# Patient Record
Sex: FEMALE | Race: BLACK OR AFRICAN AMERICAN | NOT HISPANIC OR LATINO | URBAN - METROPOLITAN AREA
[De-identification: names, ages, dates, MRNs, and addresses within clinical notes are randomized per-mention and may not be internally consistent; named-entity substitution may affect disease eponyms.]

---

## 2017-01-11 ENCOUNTER — INPATIENT (INPATIENT)
Facility: HOSPITAL | Age: 77
LOS: 23 days | Discharge: NURSING, REHAB OR LONG-TERM CARE | End: 2017-02-04
Attending: HOSPITALIST | Admitting: HOSPITALIST
Payer: COMMERCIAL

## 2017-01-11 VITALS
OXYGEN SATURATION: 100 % | RESPIRATION RATE: 16 BRPM | SYSTOLIC BLOOD PRESSURE: 139 MMHG | TEMPERATURE: 99 F | HEART RATE: 87 BPM | DIASTOLIC BLOOD PRESSURE: 62 MMHG

## 2017-01-11 DIAGNOSIS — Z98.891 HISTORY OF UTERINE SCAR FROM PREVIOUS SURGERY: Chronic | ICD-10-CM

## 2017-01-11 LAB
ALBUMIN SERPL ELPH-MCNC: 2.3 G/DL — LOW (ref 3.3–5)
ALP SERPL-CCNC: 84 U/L — SIGNIFICANT CHANGE UP (ref 40–120)
ALT FLD-CCNC: 14 U/L — SIGNIFICANT CHANGE UP (ref 4–33)
APPEARANCE UR: SIGNIFICANT CHANGE UP
AST SERPL-CCNC: 12 U/L — SIGNIFICANT CHANGE UP (ref 4–32)
BACTERIA # UR AUTO: SIGNIFICANT CHANGE UP
BASE EXCESS BLDV CALC-SCNC: 3.5 MMOL/L — SIGNIFICANT CHANGE UP
BASOPHILS # BLD AUTO: 0.03 K/UL — SIGNIFICANT CHANGE UP (ref 0–0.2)
BASOPHILS NFR BLD AUTO: 0.2 % — SIGNIFICANT CHANGE UP (ref 0–2)
BILIRUB SERPL-MCNC: 0.5 MG/DL — SIGNIFICANT CHANGE UP (ref 0.2–1.2)
BILIRUB UR-MCNC: NEGATIVE — SIGNIFICANT CHANGE UP
BLOOD GAS VENOUS - CREATININE: 0.92 MG/DL — SIGNIFICANT CHANGE UP (ref 0.5–1.3)
BLOOD UR QL VISUAL: HIGH
BUN SERPL-MCNC: 4 MG/DL — LOW (ref 7–23)
CALCIUM SERPL-MCNC: 8.3 MG/DL — LOW (ref 8.4–10.5)
CHLORIDE BLDV-SCNC: 104 MMOL/L — SIGNIFICANT CHANGE UP (ref 96–108)
CHLORIDE SERPL-SCNC: 99 MMOL/L — SIGNIFICANT CHANGE UP (ref 98–107)
CO2 SERPL-SCNC: 23 MMOL/L — SIGNIFICANT CHANGE UP (ref 22–31)
COLOR SPEC: YELLOW — SIGNIFICANT CHANGE UP
CREAT SERPL-MCNC: 0.58 MG/DL — SIGNIFICANT CHANGE UP (ref 0.5–1.3)
EOSINOPHIL # BLD AUTO: 0.01 K/UL — SIGNIFICANT CHANGE UP (ref 0–0.5)
EOSINOPHIL NFR BLD AUTO: 0.1 % — SIGNIFICANT CHANGE UP (ref 0–6)
GAS PNL BLDV: 135 MMOL/L — LOW (ref 136–146)
GLUCOSE BLDV-MCNC: 104 — HIGH (ref 70–99)
GLUCOSE SERPL-MCNC: 179 MG/DL — HIGH (ref 70–99)
GLUCOSE UR-MCNC: NEGATIVE — SIGNIFICANT CHANGE UP
HCO3 BLDV-SCNC: 27 MMOL/L — SIGNIFICANT CHANGE UP (ref 20–27)
HCT VFR BLD CALC: 30.1 % — LOW (ref 34.5–45)
HCT VFR BLDV CALC: 36.7 % — SIGNIFICANT CHANGE UP (ref 34.5–45)
HGB BLD-MCNC: 10.3 G/DL — LOW (ref 11.5–15.5)
HGB BLDV-MCNC: 11.9 G/DL — SIGNIFICANT CHANGE UP (ref 11.5–15.5)
HYALINE CASTS # UR AUTO: SIGNIFICANT CHANGE UP (ref 0–?)
IMM GRANULOCYTES NFR BLD AUTO: 0.4 % — SIGNIFICANT CHANGE UP (ref 0–1.5)
KETONES UR-MCNC: NEGATIVE — SIGNIFICANT CHANGE UP
LACTATE BLDV-MCNC: 1.2 MMOL/L — SIGNIFICANT CHANGE UP (ref 0.5–2)
LEUKOCYTE ESTERASE UR-ACNC: HIGH
LYMPHOCYTES # BLD AUTO: 10.9 % — LOW (ref 13–44)
LYMPHOCYTES # BLD AUTO: 2.12 K/UL — SIGNIFICANT CHANGE UP (ref 1–3.3)
MCHC RBC-ENTMCNC: 27.9 PG — SIGNIFICANT CHANGE UP (ref 27–34)
MCHC RBC-ENTMCNC: 34.2 % — SIGNIFICANT CHANGE UP (ref 32–36)
MCV RBC AUTO: 81.6 FL — SIGNIFICANT CHANGE UP (ref 80–100)
MONOCYTES # BLD AUTO: 1.52 K/UL — HIGH (ref 0–0.9)
MONOCYTES NFR BLD AUTO: 7.8 % — SIGNIFICANT CHANGE UP (ref 2–14)
MUCOUS THREADS # UR AUTO: SIGNIFICANT CHANGE UP
NEUTROPHILS # BLD AUTO: 15.66 K/UL — HIGH (ref 1.8–7.4)
NEUTROPHILS NFR BLD AUTO: 80.6 % — HIGH (ref 43–77)
NITRITE UR-MCNC: NEGATIVE — SIGNIFICANT CHANGE UP
NON-SQ EPI CELLS # UR AUTO: <1 — SIGNIFICANT CHANGE UP
PCO2 BLDV: 45 MMHG — SIGNIFICANT CHANGE UP (ref 41–51)
PH BLDV: 7.41 PH — SIGNIFICANT CHANGE UP (ref 7.32–7.43)
PH UR: 6.5 — SIGNIFICANT CHANGE UP (ref 4.6–8)
PLATELET # BLD AUTO: 515 K/UL — HIGH (ref 150–400)
PMV BLD: 9.6 FL — SIGNIFICANT CHANGE UP (ref 7–13)
PO2 BLDV: 33 MMHG — LOW (ref 35–40)
POTASSIUM BLDV-SCNC: 3.9 MMOL/L — SIGNIFICANT CHANGE UP (ref 3.4–4.5)
POTASSIUM SERPL-MCNC: 4.2 MMOL/L — SIGNIFICANT CHANGE UP (ref 3.5–5.3)
POTASSIUM SERPL-SCNC: 4.2 MMOL/L — SIGNIFICANT CHANGE UP (ref 3.5–5.3)
PROT SERPL-MCNC: 6.7 G/DL — SIGNIFICANT CHANGE UP (ref 6–8.3)
PROT UR-MCNC: 100 — HIGH
RBC # BLD: 3.69 M/UL — LOW (ref 3.8–5.2)
RBC # FLD: 16.2 % — HIGH (ref 10.3–14.5)
RBC CASTS # UR COMP ASSIST: SIGNIFICANT CHANGE UP (ref 0–?)
SAO2 % BLDV: 60.8 % — SIGNIFICANT CHANGE UP (ref 60–85)
SODIUM SERPL-SCNC: 136 MMOL/L — SIGNIFICANT CHANGE UP (ref 135–145)
SP GR SPEC: 1.01 — SIGNIFICANT CHANGE UP (ref 1–1.03)
SQUAMOUS # UR AUTO: SIGNIFICANT CHANGE UP
UROBILINOGEN FLD QL: NORMAL E.U. — SIGNIFICANT CHANGE UP (ref 0.1–0.2)
WBC # BLD: 19.41 K/UL — HIGH (ref 3.8–10.5)
WBC # FLD AUTO: 19.41 K/UL — HIGH (ref 3.8–10.5)
WBC UR QL: >50 — HIGH (ref 0–?)

## 2017-01-11 RX ORDER — AZTREONAM 2 G
1000 VIAL (EA) INJECTION ONCE
Qty: 0 | Refills: 0 | Status: COMPLETED | OUTPATIENT
Start: 2017-01-11 | End: 2017-01-11

## 2017-01-11 RX ORDER — ERTAPENEM SODIUM 1 G/1
1000 INJECTION, POWDER, LYOPHILIZED, FOR SOLUTION INTRAMUSCULAR; INTRAVENOUS ONCE
Qty: 0 | Refills: 0 | Status: DISCONTINUED | OUTPATIENT
Start: 2017-01-11 | End: 2017-01-11

## 2017-01-11 RX ADMIN — Medication 50 MILLIGRAM(S): at 23:09

## 2017-01-11 NOTE — ED PROVIDER NOTE - OBJECTIVE STATEMENT
Pt. is 75yo F PMHx HTN, DM p/w CC of nonhealing sacral ulcer - pt. brought in by daughter for evaluation of sacral ulcer - pt. just returned from [facility] today, she was recently hospitalized in November for urosepsis and has been bedbound since, family noticed ulcer on Dec. 22 - she has underwent multiple debridements, last one being Sunday, however family believes pt. was not receiving adequate care and was brought here. Pt. currently denies any pain or discomfort. She denies fever, chills, CP, N/V/D.

## 2017-01-11 NOTE — ED PROVIDER NOTE - ATTENDING CONTRIBUTION TO CARE
75yo F PMHx HTN, DM p/w CC of nonhealing sacral ulcer - pt. brought in by daughter for evaluation of sacral ulcer - pt. just returned from [facility] today, she was recently hospitalized in November for urosepsis and has been bedbound since, family noticed ulcer on Dec. 22 - she has underwent multiple debridements, last one being Sunday, however family believes pt. was not receiving adequate care and was brought here. Pt. currently denies any pain or discomfort. She denies fever, chills, CP, N/V/D. VS noted. extensive deep sacral decubitus stage 4 ulcer, 15x12 cm, very deep down to sacral bone and involving gluteal muscles, No purulent drainage, No necrotic or gangrenous tissue. Discussed with Gen surg, who recommends admission to medicine with consultation of Dr Win for wound care. Preop labs sent Xrays ordered, IV antibiotics given, No emergency debridement or surgery needed tonight.

## 2017-01-11 NOTE — ED ADULT NURSE NOTE - OBJECTIVE STATEMENT
Received pt in room 8, c/o complication after pressure ulcer debridement.  Pt had surgical debridement done in [facility] and was unsatisfied with post-op care.  Pt presents today with appox. 30cm x 20cm wound to sacrum/buttock.  Surgical team called and at bedside.  Wet-to-dry dressing applied by surgical team.  Septic workup completed as ordered by MD.  Pt A&Ox3, but lethargic.  No other skin breakdown noted.  Howell catheter to be changed as per MD.  IV access obtained.  Awaiting further MD orders.

## 2017-01-11 NOTE — ED ADULT TRIAGE NOTE - CHIEF COMPLAINT QUOTE
s/p surgical debridement of "bed sore" Sunday in [place].  Present for wound check.  Just arrived today from [place]

## 2017-01-11 NOTE — ED PROVIDER NOTE - MEDICAL DECISION MAKING DETAILS
75yo F PMHx HTN DM Dementia p/w large, nonhealing sacral ulcer - will call for surgery consult and wound care - send for basic, preop labs and sepsis workup: cbc, cmp, vbg, type+screen, blood cultures, coags - well get XR of chest and pelvis

## 2017-01-12 DIAGNOSIS — R82.90 UNSPECIFIED ABNORMAL FINDINGS IN URINE: ICD-10-CM

## 2017-01-12 DIAGNOSIS — F03.90 UNSPECIFIED DEMENTIA, UNSPECIFIED SEVERITY, WITHOUT BEHAVIORAL DISTURBANCE, PSYCHOTIC DISTURBANCE, MOOD DISTURBANCE, AND ANXIETY: ICD-10-CM

## 2017-01-12 DIAGNOSIS — E11.9 TYPE 2 DIABETES MELLITUS WITHOUT COMPLICATIONS: ICD-10-CM

## 2017-01-12 DIAGNOSIS — A41.9 SEPSIS, UNSPECIFIED ORGANISM: ICD-10-CM

## 2017-01-12 DIAGNOSIS — I10 ESSENTIAL (PRIMARY) HYPERTENSION: ICD-10-CM

## 2017-01-12 DIAGNOSIS — R13.10 DYSPHAGIA, UNSPECIFIED: ICD-10-CM

## 2017-01-12 DIAGNOSIS — Z41.8 ENCOUNTER FOR OTHER PROCEDURES FOR PURPOSES OTHER THAN REMEDYING HEALTH STATE: ICD-10-CM

## 2017-01-12 DIAGNOSIS — D72.829 ELEVATED WHITE BLOOD CELL COUNT, UNSPECIFIED: ICD-10-CM

## 2017-01-12 DIAGNOSIS — L89.159 PRESSURE ULCER OF SACRAL REGION, UNSPECIFIED STAGE: ICD-10-CM

## 2017-01-12 DIAGNOSIS — L98.493 NON-PRESSURE CHRONIC ULCER OF SKIN OF OTHER SITES WITH NECROSIS OF MUSCLE: ICD-10-CM

## 2017-01-12 LAB
MORPHOLOGY BLD-IMP: NORMAL — SIGNIFICANT CHANGE UP
PLATELET CLUMP BLD QL SMEAR: SLIGHT — SIGNIFICANT CHANGE UP
PLATELET COUNT - ESTIMATE: SIGNIFICANT CHANGE UP
SPECIMEN SOURCE: SIGNIFICANT CHANGE UP
SPECIMEN SOURCE: SIGNIFICANT CHANGE UP

## 2017-01-12 PROCEDURE — 99222 1ST HOSP IP/OBS MODERATE 55: CPT | Mod: GC

## 2017-01-12 PROCEDURE — 71020: CPT | Mod: 26

## 2017-01-12 PROCEDURE — 72170 X-RAY EXAM OF PELVIS: CPT | Mod: 26

## 2017-01-12 PROCEDURE — 99223 1ST HOSP IP/OBS HIGH 75: CPT

## 2017-01-12 RX ORDER — ATORVASTATIN CALCIUM 80 MG/1
40 TABLET, FILM COATED ORAL AT BEDTIME
Qty: 0 | Refills: 0 | Status: DISCONTINUED | OUTPATIENT
Start: 2017-01-12 | End: 2017-02-04

## 2017-01-12 RX ORDER — HEPARIN SODIUM 5000 [USP'U]/ML
5000 INJECTION INTRAVENOUS; SUBCUTANEOUS EVERY 8 HOURS
Qty: 0 | Refills: 0 | Status: DISCONTINUED | OUTPATIENT
Start: 2017-01-12 | End: 2017-02-04

## 2017-01-12 RX ORDER — DEXTROSE 50 % IN WATER 50 %
25 SYRINGE (ML) INTRAVENOUS ONCE
Qty: 0 | Refills: 0 | Status: DISCONTINUED | OUTPATIENT
Start: 2017-01-12 | End: 2017-02-04

## 2017-01-12 RX ORDER — INSULIN LISPRO 100/ML
VIAL (ML) SUBCUTANEOUS
Qty: 0 | Refills: 0 | Status: DISCONTINUED | OUTPATIENT
Start: 2017-01-12 | End: 2017-02-04

## 2017-01-12 RX ORDER — DEXTROSE 50 % IN WATER 50 %
1 SYRINGE (ML) INTRAVENOUS ONCE
Qty: 0 | Refills: 0 | Status: DISCONTINUED | OUTPATIENT
Start: 2017-01-12 | End: 2017-02-04

## 2017-01-12 RX ORDER — ERTAPENEM SODIUM 1 G/1
1000 INJECTION, POWDER, LYOPHILIZED, FOR SOLUTION INTRAMUSCULAR; INTRAVENOUS EVERY 24 HOURS
Qty: 0 | Refills: 0 | Status: DISCONTINUED | OUTPATIENT
Start: 2017-01-13 | End: 2017-01-17

## 2017-01-12 RX ORDER — GLUCAGON INJECTION, SOLUTION 0.5 MG/.1ML
1 INJECTION, SOLUTION SUBCUTANEOUS ONCE
Qty: 0 | Refills: 0 | Status: DISCONTINUED | OUTPATIENT
Start: 2017-01-12 | End: 2017-02-04

## 2017-01-12 RX ORDER — INSULIN LISPRO 100/ML
VIAL (ML) SUBCUTANEOUS AT BEDTIME
Qty: 0 | Refills: 0 | Status: DISCONTINUED | OUTPATIENT
Start: 2017-01-12 | End: 2017-02-04

## 2017-01-12 RX ORDER — QUETIAPINE FUMARATE 200 MG/1
200 TABLET, FILM COATED ORAL AT BEDTIME
Qty: 0 | Refills: 0 | Status: DISCONTINUED | OUTPATIENT
Start: 2017-01-12 | End: 2017-02-04

## 2017-01-12 RX ORDER — COLLAGENASE CLOSTRIDIUM HIST. 250 UNIT/G
1 OINTMENT (GRAM) TOPICAL DAILY
Qty: 0 | Refills: 0 | Status: DISCONTINUED | OUTPATIENT
Start: 2017-01-12 | End: 2017-02-04

## 2017-01-12 RX ORDER — ERTAPENEM SODIUM 1 G/1
INJECTION, POWDER, LYOPHILIZED, FOR SOLUTION INTRAMUSCULAR; INTRAVENOUS
Qty: 0 | Refills: 0 | Status: DISCONTINUED | OUTPATIENT
Start: 2017-01-12 | End: 2017-01-17

## 2017-01-12 RX ORDER — VANCOMYCIN HCL 1 G
750 VIAL (EA) INTRAVENOUS EVERY 12 HOURS
Qty: 0 | Refills: 0 | Status: DISCONTINUED | OUTPATIENT
Start: 2017-01-12 | End: 2017-01-13

## 2017-01-12 RX ORDER — ERTAPENEM SODIUM 1 G/1
1000 INJECTION, POWDER, LYOPHILIZED, FOR SOLUTION INTRAMUSCULAR; INTRAVENOUS ONCE
Qty: 0 | Refills: 0 | Status: COMPLETED | OUTPATIENT
Start: 2017-01-12 | End: 2017-01-12

## 2017-01-12 RX ORDER — VANCOMYCIN HCL 1 G
1250 VIAL (EA) INTRAVENOUS EVERY 12 HOURS
Qty: 0 | Refills: 0 | Status: DISCONTINUED | OUTPATIENT
Start: 2017-01-12 | End: 2017-01-12

## 2017-01-12 RX ORDER — DEXTROSE 50 % IN WATER 50 %
12.5 SYRINGE (ML) INTRAVENOUS ONCE
Qty: 0 | Refills: 0 | Status: DISCONTINUED | OUTPATIENT
Start: 2017-01-12 | End: 2017-02-04

## 2017-01-12 RX ORDER — AZTREONAM 2 G
1000 VIAL (EA) INJECTION EVERY 8 HOURS
Qty: 0 | Refills: 0 | Status: DISCONTINUED | OUTPATIENT
Start: 2017-01-12 | End: 2017-01-12

## 2017-01-12 RX ORDER — VANCOMYCIN HCL 1 G
1000 VIAL (EA) INTRAVENOUS ONCE
Qty: 0 | Refills: 0 | Status: COMPLETED | OUTPATIENT
Start: 2017-01-12 | End: 2017-01-12

## 2017-01-12 RX ORDER — PIPERACILLIN AND TAZOBACTAM 4; .5 G/20ML; G/20ML
3.38 INJECTION, POWDER, LYOPHILIZED, FOR SOLUTION INTRAVENOUS EVERY 8 HOURS
Qty: 0 | Refills: 0 | Status: DISCONTINUED | OUTPATIENT
Start: 2017-01-12 | End: 2017-01-12

## 2017-01-12 RX ORDER — SODIUM CHLORIDE 9 MG/ML
1000 INJECTION, SOLUTION INTRAVENOUS
Qty: 0 | Refills: 0 | Status: DISCONTINUED | OUTPATIENT
Start: 2017-01-12 | End: 2017-02-04

## 2017-01-12 RX ADMIN — Medication 250 MILLIGRAM(S): at 03:14

## 2017-01-12 RX ADMIN — QUETIAPINE FUMARATE 200 MILLIGRAM(S): 200 TABLET, FILM COATED ORAL at 23:07

## 2017-01-12 RX ADMIN — QUETIAPINE FUMARATE 200 MILLIGRAM(S): 200 TABLET, FILM COATED ORAL at 00:19

## 2017-01-12 RX ADMIN — Medication 4: at 17:23

## 2017-01-12 RX ADMIN — Medication 150 MILLIGRAM(S): at 13:36

## 2017-01-12 RX ADMIN — Medication 50 MILLIGRAM(S): at 16:21

## 2017-01-12 RX ADMIN — Medication 50 MILLIGRAM(S): at 06:34

## 2017-01-12 RX ADMIN — ERTAPENEM SODIUM 120 MILLIGRAM(S): 1 INJECTION, POWDER, LYOPHILIZED, FOR SOLUTION INTRAMUSCULAR; INTRAVENOUS at 23:06

## 2017-01-12 RX ADMIN — HEPARIN SODIUM 5000 UNIT(S): 5000 INJECTION INTRAVENOUS; SUBCUTANEOUS at 23:07

## 2017-01-12 RX ADMIN — HEPARIN SODIUM 5000 UNIT(S): 5000 INJECTION INTRAVENOUS; SUBCUTANEOUS at 17:23

## 2017-01-12 RX ADMIN — HEPARIN SODIUM 5000 UNIT(S): 5000 INJECTION INTRAVENOUS; SUBCUTANEOUS at 03:14

## 2017-01-12 RX ADMIN — ATORVASTATIN CALCIUM 40 MILLIGRAM(S): 80 TABLET, FILM COATED ORAL at 23:06

## 2017-01-12 NOTE — SWALLOW BEDSIDE ASSESSMENT ADULT - SWALLOW EVAL: RECOMMENDED FEEDING/EATING TECHNIQUES
small sips/bites/alternate food with liquid/allow for swallow between intakes/no straws/maintain upright posture during/after eating for 30 mins/oral hygiene/hard swallow w/ each bite or sip/crush medication (when feasible)/check mouth frequently for oral residue/pocketing/position upright (90 degrees)

## 2017-01-12 NOTE — OCCUPATIONAL THERAPY INITIAL EVALUATION ADULT - MD ORDER
Occupational Therapy to evaluate and treat. OOB with assistance. Per BURKE Moon, pt. is okay to participate in OT evaluation & perform OOB activity.

## 2017-01-12 NOTE — H&P ADULT. - PROBLEM SELECTOR PLAN 2
WBC 19.41 on admission, 17.95 yesterday likely in context of sacral wound infection/ vs less likely to be 2/2 UTI   - continue broad spectrum coverage for now   - monitor cbc   - f/u blood cultures patient with stage IV sacral wound which is not erythematous with visible granulation tissue   - surgery consulted, wound care surgeon to see patient in AM   - local wound dressings  - broad spectrum antibiotics as above   - f/u pelvic xray  - PT/OT consult

## 2017-01-12 NOTE — H&P ADULT. - PROBLEM SELECTOR PLAN 1
patient with stage IV sacral wound which is not erythematous with visible granulation tissue   -s/p 1 dose of aztreonam in ED   - monitor CBC   - surgery consulted, wound care surgeon to see patient in AM   - local wound dressings  - f/u pelvic xray patient tachycardic on admission with leukocytosis (WBC: 19.41) meeting sepsis criteria. Source likely sacral wound.   - s/p Aztreonam in ED. Continue broad spectrum coverage with vancomycin/zosyn   - monitor cbc   - IVF: NS @ 75 cc/hr   - f/u blood cultures patient tachycardic on admission with leukocytosis (WBC: 19.41) meeting sepsis criteria. Source likely sacral wound.   - continue broad spectrum coverage with Aztreonam  - monitor cbc   - IVF: NS @ 75 cc/hr   - f/u blood cultures patient tachycardic on admission with leukocytosis (WBC: 19.41) meeting sepsis criteria. Source likely sacral wound.   - continue broad spectrum coverage with Vancomycin/Aztreonam  - monitor cbc   - IVF: NS @ 75 cc/hr   - f/u blood cultures

## 2017-01-12 NOTE — H&P ADULT. - ASSESSMENT
73 yr female w/ hx of DM, HTN, stage IV sacral decubitus ulcer and recent admission in [place] for septic shock 2/2 sacral wound infection transferred for management of sacral wound 73 yr female w/ hx of DM, HTN, stage IV sacral decubitus ulcer and recent admission in [place] for septic shock 2/2 sacral wound infection transferred for management of sacral wound. Patient admitted with sepsis likely 2/2 infected sacral wound.

## 2017-01-12 NOTE — SWALLOW BEDSIDE ASSESSMENT ADULT - ASR SWALLOW ASPIRATION MONITOR
throat clearing/upper respiratory infection/fever/change of breathing pattern/cough/gurgly voice/pneumonia/position upright (90Y)/oral hygiene

## 2017-01-12 NOTE — OCCUPATIONAL THERAPY INITIAL EVALUATION ADULT - ADDITIONAL COMMENTS
Pt. unable to provide previous level of functioning 2/2 hx dementia & noted confusion. Per chart, "pt. was ambulating until about November at which time she became increasingly weak so she spent most of her time in bed."

## 2017-01-12 NOTE — H&P ADULT. - SKIN
detailed exam sacral wound 10 x 15 cm with granulation tissue no significant purulent discharge.  1 x 2 cm wound overlying left medial malleolus with visible granulation tissue

## 2017-01-12 NOTE — OCCUPATIONAL THERAPY INITIAL EVALUATION ADULT - BED MOBILITY LIMITATIONS, REHAB EVAL
impaired ability to control trunk for mobility/decreased ability to use arms for pushing/pulling/decreased ability to use legs for bridging/pushing

## 2017-01-12 NOTE — PATIENT PROFILE ADULT. - LOCATION
coccyx extending to buttock Right posterior thigh Left lateral malleolus Right Upper buttocks Left lateral heel Right lateral heel sacrococcygeal extending to bilateral buttocks and perirectal area

## 2017-01-12 NOTE — DIETITIAN INITIAL EVALUATION ADULT. - PROBLEM SELECTOR PLAN 1
patient tachycardic on admission with leukocytosis (WBC: 19.41) meeting sepsis criteria. Source likely sacral wound.   - continue broad spectrum coverage with Vancomycin/Aztreonam  - monitor cbc   - IVF: NS @ 75 cc/hr   - f/u blood cultures

## 2017-01-12 NOTE — OCCUPATIONAL THERAPY INITIAL EVALUATION ADULT - PLANNED THERAPY INTERVENTIONS, OT EVAL
fine motor coordination training/strengthening/bed mobility training/ADL retraining/cognitive, visual perceptual/transfer training/ROM/balance training

## 2017-01-12 NOTE — SWALLOW BEDSIDE ASSESSMENT ADULT - SWALLOW EVAL: DIAGNOSIS
1. The patient demonstrated a mild oral dysphagia for puree and thin liquid textures marked by delayed bolus collection, transfer, and posterior transport. 2. The patient demonstrated a mild-moderate oral dysphagia for soft solid textures marked by delayed bolus collection and formation with prolonged mastication. Patient able to successfully clear bolus residue from oral cavity with a liquid wash and subsequent swallow.  3. The patient demonstrated a mild-moderate pharyngeal dysphagia for puree, soft solid, and thin liquid textures marked by delayed swallow trigger with reduced hyolaryngeal elevation upon digital palpation. No clinical s/s suggestive of aspiration/penetration observed.

## 2017-01-12 NOTE — SWALLOW BEDSIDE ASSESSMENT ADULT - COMMENTS
The patient was seen this afternoon for initial assessment of swallow function, at which time patient was alert and cooperative. Upon clinician arrival, patient seated in upright position on the edge of the bed consuming a soft solid lunch that her daughter had brought in. Patient's daughter was present throughout today's evaluation. The patient was admitted to [facility] for sepsis 2/2 sacral wound infection. Recent xray of the chest on 1/12/17 revealed the lungs are clear. Discussed results and recommendations from this evaluation with the patient, patient's daughter, RN, and Team 2.

## 2017-01-12 NOTE — H&P ADULT. - LAB RESULTS AND INTERPRETATION
labs reviewed. Notable for leukocytosis (WBC: 19.41) and hgb of 10.3. UA w/ large leukocyte esterase and >50 WBCs

## 2017-01-12 NOTE — H&P ADULT. - HISTORY OF PRESENT ILLNESS
76 yr old female w/ hx of DM       In the ED, vital signs: T:98.6, HR:86, BP:139/62, RR:16 and O2 sat: 100% on RA. Labs were notable for leukocytosis (WBC: 19.41) and hgb of 10.3 and a UA showed large leukocyte esterase and >50 WBCs. Patient received Aztreonam. 76 yr old female w/ hx of DM and HTN presenting with     Patient was recently hospitalized in [facility] after being found unresponsive for septic shock 2/2 sacral wound infection. As per transfer documentation, patient was treated with ceftriaxone/clindamycin/ciprofloxacin, required 4 units pRBCs, had wound debridement x 2, which showed a 20cm x 15 cm sacral bedsore with necrotic coccyx bone. As it was determined that pt will require further surgical debridement as well as flap reconstruction, patient's family elected to [place] the [place] for further management.     In the ED, vital signs: T:98.6, HR:86, BP:139/62, RR:16 and O2 sat: 100% on RA. Labs were notable for leukocytosis (WBC: 19.41) and hgb of 10.3 and a UA showed large leukocyte esterase and >50 WBCs. Patient received Aztreonam. 73 yr female w/ hx of DM, HTN, stage IV sacral decubitus ulcer and recent admission in [place] for septic shock 2/2 sacral wound infection transferred for management of sacral wound.    Patient was recently hospitalized in [place] after being found unresponsive for septic shock 2/2 sacral wound infection. As per transfer documentation, patient was treated with ceftriaxone/clindamycin/ciprofloxacin, required 4 units pRBCs, had wound debridement x 2, which showed a 20cm x 15 cm sacral bedsore with necrotic coccyx bone. As it was determined that pt will require further surgical debridement and flap reconstruction, patient's family elected [place] further [place]. Patient had been in the hospital until yesterday and [place] today.     Patient reports that she feels well and has no complaints. She does, however, endorse sacral pain when wound dressings are changed and when she lies down for an extended period of time. Patient denies fevers/chills, headaches, nausea/vomiting, abdominal pain, URI symptoms and dysuria.     Patient lived in the [place] for many years and moved to [facility] about 6 years ago after retiring. She has never smoked and does not drink alcohol. Patient's daughter reports that her mother was ambulatory until about November at which time she became increasingly weak so she spent most of her time in bed.     In the ED, vital signs: T:98.6, HR:86, BP:139/62, RR:16 and O2 sat: 100% on RA. Labs were notable for leukocytosis (WBC: 19.41) and hgb of 10.3 and a UA showed large leukocyte esterase and >50 WBCs. Patient received Aztreonam.

## 2017-01-12 NOTE — OCCUPATIONAL THERAPY INITIAL EVALUATION ADULT - DIAGNOSIS, OT EVAL
Stage IV sacral decubitus ulcer; Decreased attention; Decreased ability to follow commands; Generalized weakness; Decreased functional mobility; Decreased ADL management

## 2017-01-12 NOTE — OCCUPATIONAL THERAPY INITIAL EVALUATION ADULT - PERTINENT HX OF CURRENT PROBLEM, REHAB EVAL
Pt is a 72 yo F w/ hx of DM & HTN. Pt lives in [place] & was hospitalized there for septic shock 2/2 stage IV sacral decubitus ulcer  infection. As it was determined that pt will require further surgical debridement and flap reconstruction, patient's family elected to bring [place] for further management.

## 2017-01-12 NOTE — ADVANCED PRACTICE NURSE CONSULT - ASSESSMENT
Received patient sitting at side of bed with daughter, on low air loss bed, single breathable underpad, Z-flex boots & Z-Timothy fluidized positioning device in use. Palpable bilateral DP & PT pulses, doppler biphasic bilateral DP & PT sounds, capillary refill 3 seconds, multiple areas of darkened skin pigmentation bilateral feet, no temperature change.    Sacrococcygeal extending to bilateral buttocks and perirectal area, pressure injury, 2cm of intact skin  distal end of injury from anus, entire area measures [id number], mixed stage 4 & stage 2, pressure injury, full thickness open area measures [id number] at its deepest, bone exposed, 25% moist, yellow slough at distal base, 75% agranular  with 20% darkened dermis, 55% pink, moist with scattered areas of fibrin exudate, undermining from 7-1 o'clock range 1-5cm with 4-5 cm being from 7-8 o'clock, from 4-5 o'clock range 2-4 cm with 4 cm being at 5 o'clock, moderate serosanguinous drainage, no odor, periwound skin with hyperpigmentation, multiple areas of denuded epidermis, satellite lesions extending 1cm from 5 o'clock ([id number] base moist pink dermis, extending 1cm at 7 o'clock (1.5cmx1.5cmx0.2cm, [id number], 2cmx2.5cmx0.2cm) all with pink dermis, no induration, no increased warmth, no erythema. Goals of care; protect periwound skin, soften & enzymatically debride necrotic tissue, absorb.    Right Posterior Thigh, partial thickness pressure injury, stage 2, 2.5cmx2.5cmx0.2cm, base moist, pink dermis with scattered areas of fibrin exudate, scant serous drainage, periwound skin with hypopigmentation, no induration, no erythema, no increased warmth. Goals of care: promote healing, absorb & facilitate autolytic debridement.    Left lateral Malleolus, unstageable pressure injury, 2cmx1.5cmx0.5cm, unable to determine complete anatomical depth due to necrotic tissue, base 100% dry, yellow slough, no drainage, periwound skin with hyperpigmentation, no induration, no increased warmth, no erythema. Goals of care: protect periwound skin, soften & enzymatically debride necrotic tissue, absorb.    Right Upper Buttocks, partial thickness, stage 2, pressure injury, 1.2cmx0.8cmx0.2cm, base moist, pink dermis, periwound skin with hypopigmentation. Goals of care: promote healing environment.    Moisture associated dermatitis, chronic skin changes-hyperpigmentation & moist under pannus & bilateral groins, moist under bilateral breasts. Goals of care: wick moisture, provide antimicrobial protection.    Left lateral heel, DTPI, purplish-blue discoloration, 3.5cmx4.5cmx0, periwound skin with blanching erythema, areas of darkened skin pigmentation, dry, skin. Goals of care: provide liquid barrier protection and continue to observe for changes in tissue type.    Right lateral Heel, DTPI, purplish-blue discoloration, 3.5cmx2.5cmx0, periwound skin with blanching erythema, areas of darkened skin pigmentation, dry, skin. Goals of care: provide liquid barrier protection and continue to observe for changes in tissue type.    Discussion with attending Dr Cabrales, plan is for Plastic surgery & Wound MD consult to evaluate sacrococcygeal extending to bilateral buttocks and perirectal area.    Findings discussed with primary team, Dr Ceballos, patient can follow up with Dr Li at [facility] wound center ([address], [facility] [phone]) for continued wound healing.

## 2017-01-12 NOTE — OCCUPATIONAL THERAPY INITIAL EVALUATION ADULT - LIVES WITH, PROFILE
Per chart, pt. moved to [facility] from [place] ago after retiring. Pt. explains she lives with her family/children.

## 2017-01-12 NOTE — DIETITIAN INITIAL EVALUATION ADULT. - PROBLEM SELECTOR PLAN 2
patient with stage IV sacral wound which is not erythematous with visible granulation tissue   - surgery consulted, wound care surgeon to see patient in AM   - local wound dressings  - broad spectrum antibiotics as above   - f/u pelvic xray  - PT/OT consult

## 2017-01-12 NOTE — DIETITIAN INITIAL EVALUATION ADULT. - NS AS NUTRI INTERV MEDICAL AND FOOD SUPPLEMENTS
Commercial beverage/Glucerna 8 oz. BID (380kcal, 20g PRO), Prosource 1 packet BID (30 g PRO) Commercial beverage/Glucerna 8 oz. 2x daily (380kcal, 20g PRO), Prosource 1 packet 2x/day (30 g PRO)

## 2017-01-12 NOTE — ADVANCED PRACTICE NURSE CONSULT - RECOMMEDATIONS
Recommendations for topical management:    Prealbumin level.    Nutrition consult to evaluate and optimize nutrition for healing.    Sacrococcygeal extending to bilateral buttocks and perirectal area: Cleanse with SAF-Clens, rinse well with NS. Apply Liquid barrier film to periwound skin. Place cut Adapt 2 inch barrier ring (#7805) along distal edge to create bridge from anus. Apply Collagenase 3- 9 gm tubes- nickel thick to base, cover with Aquacel hydrofiber sheeting (2 [id number]), secure with foam with border. Change daily.     Right Posterior Thigh and Right Upper Buttocks: clean with SAF-clens. Apply Liquid barrier film to periwound skin. Place (Comfeel) Hydrocolloid as primary dressing. Change every 3 days.    Left Lateral malleolus: Cleanse with SAF-Clens, rinse well with NS. Apply Liquid barrier film to periwound skin. Apply Collagenase nickel thick to base, cover with foam with border. Change daily.     Left & Right Lateral Heels: Clean with NS. Apply Liquid barrier film twice a day and continue to observe for changes in tissue type.    Moisture associated dermatitis under pannus, bilateral groins & bilateral breasts: Place Inter[employer] textile sheeting under pannus, bilateral breasts & groins, leaving 2 inches exposed on ends to wick moisture, remove to wash & dry affected area, then replace. Individual sheeting may be used for up to 5 days unless soiled.     Discussed with patient, nutrition, glycemic control, turning &repositioning q2h with heels off-loaded, topical management and follow up with Plastic surgery and Wound MD, Dr Li for continued wound healing.    Continue low air loss bed therapy, initiate seat cushion when sitting in chair , continue heel elevation with Z-Flex boots while in bed, continue Z-Timothy fluidized positioning device for pressure redistribution and assistance to turn & reposition q2h, continue moisture management with liquid barrier film & single breathable pad, continue measures to decrease friction/shear/pressure. Recommend monitor weights, nutrition as per Dietary recommendations.    Please call WO service line at x [phone], if we can be of further assistance.

## 2017-01-12 NOTE — ADVANCED PRACTICE NURSE CONSULT - REASON FOR CONSULT
Patient seen on skin care rounds, after wound care referral received from MD, to evaluate impaired skin integrity and recommend topical management. Chart reviewed: Serum albumin 2.3, Saroj 13, general nazanin status poor, patient & daughter, Alejandrina, interviewed, non-ambulatory, primarily bedbound recently, as per chart, H/O DM, HTN and stage 4 pressure injury, patient was relocated to [facility], recently hospitalized as being found unresponsive for septic shock due to sacral wound infection, s/p surgical debridement x2,last this past Sunday, allergic to Penicillin, patient was flown from [facility] back [place] for management of chronic full thickness, stage 4 pressure injury, admitted with sepsis, on systemic antibiotics, presents with urethral to catheter to BSD, multiple pressure injuries, and moisture associated dermatitis. Patient seen bedside with Plastic surgeon, Dr Leyva. Patient seen & being followed by ID services. Patient seen on skin care rounds, after wound care referral received from MD, to evaluate impaired skin integrity and recommend topical management. Chart reviewed: Serum albumin 2.3, Saroj 13, general nazanin status poor, patient & daughter, Alejandrina, interviewed, non-ambulatory, primarily bedbound recently, as per chart, H/O DM, HTN and stage 4 pressure injury, patient was relocated to [facility], recently hospitalized with Urosepsis and then after being found unresponsive, septic shock due to sacral wound infection, s/p surgical debridement x2,last this past Sunday, allergic to Penicillin, patient was flown from [facility] back [place] for management of chronic full thickness, stage 4 pressure injury, admitted with sepsis, on systemic antibiotics, presents with urethral to catheter to BSD, multiple pressure injuries, and moisture associated dermatitis. Patient seen bedside with Plastic surgeon, Dr Leyva. Patient seen & being followed by ID services.

## 2017-01-12 NOTE — SWALLOW BEDSIDE ASSESSMENT ADULT - ORAL PHASE
Decreased anterior-posterior movement of the bolus/Delayed oral transit time Delayed oral transit time/Decreased anterior-posterior movement of the bolus

## 2017-01-12 NOTE — OCCUPATIONAL THERAPY INITIAL EVALUATION ADULT - LEVEL OF INDEPENDENCE: SIT/STAND, REHAB EVAL
Not appropriate to assess at this time 2/2 noted decreased sitting balance. To be assessed at later date/time when safe & appropriate.

## 2017-01-12 NOTE — OCCUPATIONAL THERAPY INITIAL EVALUATION ADULT - RANGE OF MOTION EXAMINATION, UPPER EXTREMITY
B/L UE: Shoulder Flexion/Extension A/AROM 0-85 degrees, Elbow/Wrist/Hand Flexion/Extension A/AROM WFL

## 2017-01-12 NOTE — OCCUPATIONAL THERAPY INITIAL EVALUATION ADULT - GENERAL OBSERVATIONS, REHAB EVAL
Pt. received semisupine in bed. NAD. VSS. +C/D/I Dressing to Sacrum and L foot, +IV, +Urethral Catheter, +B/L foot pumps.

## 2017-01-12 NOTE — SWALLOW BEDSIDE ASSESSMENT ADULT - ADDITIONAL RECOMMENDATIONS
1. Small bites of soft solids/small, single cup sips of thin liquids  2. Patient requires full assist with all meals   3. Upright positioning during and ~30 min post meals  4. Contact this department if any further concerns/questions arise.

## 2017-01-12 NOTE — DIETITIAN INITIAL EVALUATION ADULT. - NUTRITION INTERVENTION
Medical Food Supplements Medical Food Supplements/Vitamin Medical Food Supplements/Meals and Snack/Vitamin

## 2017-01-12 NOTE — DIETITIAN INITIAL EVALUATION ADULT. - OTHER INFO
Nutrition consult received for stage IV pressure ulcer.  Patient is currently NPO awaiting surgical consult for stage IV pressure ulcer in sacral region.  Patient stated that she is hungry and is very upset that she has not received food since admittance.  Patient reported that she had difficulty swallowing many years ago, but has since been resolved and can tolerate a non-texture modified diet.  Patient's current weight is 169#; has not reported any recent weight loss and refused to discuss her weight or her appetite PTA at this time.  Discussed provision of MVI, Prosource 2x daily and Ensure 8 oz. 2x daily with physician and patient when medically feasible, and both were agreeable. Patient food preferences noted for when she is able to eat.  No reports of nausea, vomiting, constipation or diarrhea. Nutrition consult received for stage IV pressure ulcer.  Patient was upgraded from NPO to a consistent carbohydrate diet with evening snack, DASH/TLC, soft diet after consult with surgical team.  Patient reported that she had difficulty swallowing many years ago, but has since been resolved and can tolerate a non-texture modified diet.  Patient's current weight is 169#; has not reported any recent weight loss and refused to discuss her weight or her appetite PTA at this time.  Discussed provision of MVI, Prosource 2x daily and Glucerna 8 oz. 2x daily with physician and patient when medically feasible, and both were agreeable. Patient food preferences were noted.  No reports of nausea, vomiting, constipation or diarrhea. Nutrition consult received for stage IV pressure ulcer.  Patient was upgraded from NPO to a consistent carbohydrate diet with evening snack, DASH/TLC, soft diet.  Patient reported that she had difficulty swallowing many years ago, but has since been resolved and can tolerate a non-texture modified diet.  However, Pt. received swallow evaluation today (1/12/17) where findings indicate dysphagia & suggest mechanical soft foods. Patient's current weight is 169#; has not reported any recent weight loss and refused to discuss her weight or her appetite PTA, at this time.  Discussed provision of MVI, Prosource 2x daily and Glucerna 8 oz. 2x daily with physician and patient when medically feasible, and both were agreeable. Patient food preferences were noted.  No reports of nausea, vomiting, constipation or diarrhea.

## 2017-01-12 NOTE — PHYSICAL THERAPY INITIAL EVALUATION ADULT - PERTINENT HX OF CURRENT PROBLEM, REHAB EVAL
Pt is a 73 yr female w/ hx of DM, HTN, stage IV sacral decubitus ulcer and recent admission in [facility] for septic shock 2/2 sacral wound infection transferred for management of sacral wound.

## 2017-01-12 NOTE — H&P ADULT. - ATTENDING COMMENTS
-pt seen and examined by me  -agree with a/p and have edited / interpolated where appropriate  -d/w resident  >50% time devoted to patient-care and coordination of management

## 2017-01-13 LAB
BACTERIA UR CULT: SIGNIFICANT CHANGE UP
BASOPHILS # BLD AUTO: 0.03 K/UL — SIGNIFICANT CHANGE UP (ref 0–0.2)
BASOPHILS NFR BLD AUTO: 0.3 % — SIGNIFICANT CHANGE UP (ref 0–2)
CRP SERPL-MCNC: 114.7 MG/L — HIGH (ref 0.3–5)
EOSINOPHIL # BLD AUTO: 0.16 K/UL — SIGNIFICANT CHANGE UP (ref 0–0.5)
EOSINOPHIL NFR BLD AUTO: 1.8 % — SIGNIFICANT CHANGE UP (ref 0–6)
ERYTHROCYTE [SEDIMENTATION RATE] IN BLOOD: 71 MM/HR — HIGH (ref 4–25)
FERRITIN SERPL-MCNC: 657.6 NG/ML — HIGH (ref 15–150)
FOLATE SERPL-MCNC: 3.6 NG/ML — LOW (ref 4.7–20)
HAPTOGLOB SERPL-MCNC: 284 MG/DL — HIGH (ref 34–200)
HBA1C BLD-MCNC: 5.8 % — HIGH (ref 4–5.6)
HCT VFR BLD CALC: 26.5 % — LOW (ref 34.5–45)
HGB BLD-MCNC: 8.9 G/DL — LOW (ref 11.5–15.5)
IMM GRANULOCYTES NFR BLD AUTO: 0.4 % — SIGNIFICANT CHANGE UP (ref 0–1.5)
IRON SATN MFR SERPL: 32 UG/DL — SIGNIFICANT CHANGE UP (ref 30–160)
IRON SATN MFR SERPL: 99 UG/DL — LOW (ref 140–530)
LDH SERPL L TO P-CCNC: 177 U/L — SIGNIFICANT CHANGE UP (ref 135–225)
LYMPHOCYTES # BLD AUTO: 2.78 K/UL — SIGNIFICANT CHANGE UP (ref 1–3.3)
LYMPHOCYTES # BLD AUTO: 30.7 % — SIGNIFICANT CHANGE UP (ref 13–44)
MCHC RBC-ENTMCNC: 27.6 PG — SIGNIFICANT CHANGE UP (ref 27–34)
MCHC RBC-ENTMCNC: 33.6 % — SIGNIFICANT CHANGE UP (ref 32–36)
MCV RBC AUTO: 82 FL — SIGNIFICANT CHANGE UP (ref 80–100)
MONOCYTES # BLD AUTO: 0.77 K/UL — SIGNIFICANT CHANGE UP (ref 0–0.9)
MONOCYTES NFR BLD AUTO: 8.5 % — SIGNIFICANT CHANGE UP (ref 2–14)
NEUTROPHILS # BLD AUTO: 5.29 K/UL — SIGNIFICANT CHANGE UP (ref 1.8–7.4)
NEUTROPHILS NFR BLD AUTO: 58.3 % — SIGNIFICANT CHANGE UP (ref 43–77)
PLATELET # BLD AUTO: 517 K/UL — HIGH (ref 150–400)
PMV BLD: 9.9 FL — SIGNIFICANT CHANGE UP (ref 7–13)
PREALB SERPL-MCNC: 4 MG/DL — LOW (ref 20–40)
RBC # BLD: 3.23 M/UL — LOW (ref 3.8–5.2)
RBC # FLD: 16.1 % — HIGH (ref 10.3–14.5)
SPECIMEN SOURCE: SIGNIFICANT CHANGE UP
UIBC SERPL-MCNC: 67 UG/DL — LOW (ref 110–370)
VANCOMYCIN TROUGH SERPL-MCNC: 12.8 UG/ML — SIGNIFICANT CHANGE UP (ref 10–20)
VIT B12 SERPL-MCNC: 518 PG/ML — SIGNIFICANT CHANGE UP (ref 200–900)
WBC # BLD: 9.07 K/UL — SIGNIFICANT CHANGE UP (ref 3.8–10.5)
WBC # FLD AUTO: 9.07 K/UL — SIGNIFICANT CHANGE UP (ref 3.8–10.5)

## 2017-01-13 PROCEDURE — 99232 SBSQ HOSP IP/OBS MODERATE 35: CPT

## 2017-01-13 PROCEDURE — 72132 CT LUMBAR SPINE W/DYE: CPT | Mod: 26

## 2017-01-13 PROCEDURE — 99233 SBSQ HOSP IP/OBS HIGH 50: CPT | Mod: GC

## 2017-01-13 PROCEDURE — 99223 1ST HOSP IP/OBS HIGH 75: CPT

## 2017-01-13 RX ORDER — VANCOMYCIN HCL 1 G
1000 VIAL (EA) INTRAVENOUS EVERY 12 HOURS
Qty: 0 | Refills: 0 | Status: DISCONTINUED | OUTPATIENT
Start: 2017-01-13 | End: 2017-01-17

## 2017-01-13 RX ADMIN — ERTAPENEM SODIUM 120 MILLIGRAM(S): 1 INJECTION, POWDER, LYOPHILIZED, FOR SOLUTION INTRAMUSCULAR; INTRAVENOUS at 17:04

## 2017-01-13 RX ADMIN — HEPARIN SODIUM 5000 UNIT(S): 5000 INJECTION INTRAVENOUS; SUBCUTANEOUS at 17:05

## 2017-01-13 RX ADMIN — Medication 4: at 17:05

## 2017-01-13 RX ADMIN — HEPARIN SODIUM 5000 UNIT(S): 5000 INJECTION INTRAVENOUS; SUBCUTANEOUS at 10:25

## 2017-01-13 RX ADMIN — Medication 150 MILLIGRAM(S): at 17:50

## 2017-01-13 RX ADMIN — QUETIAPINE FUMARATE 200 MILLIGRAM(S): 200 TABLET, FILM COATED ORAL at 22:17

## 2017-01-13 RX ADMIN — Medication 150 MILLIGRAM(S): at 06:47

## 2017-01-13 RX ADMIN — Medication 1 TABLET(S): at 11:53

## 2017-01-13 RX ADMIN — ATORVASTATIN CALCIUM 40 MILLIGRAM(S): 80 TABLET, FILM COATED ORAL at 22:17

## 2017-01-13 RX ADMIN — Medication 1 APPLICATION(S): at 10:25

## 2017-01-14 LAB
BUN SERPL-MCNC: 8 MG/DL — SIGNIFICANT CHANGE UP (ref 7–23)
CALCIUM SERPL-MCNC: 8.1 MG/DL — LOW (ref 8.4–10.5)
CHLORIDE SERPL-SCNC: 103 MMOL/L — SIGNIFICANT CHANGE UP (ref 98–107)
CO2 SERPL-SCNC: 26 MMOL/L — SIGNIFICANT CHANGE UP (ref 22–31)
CREAT SERPL-MCNC: 0.35 MG/DL — LOW (ref 0.5–1.3)
GLUCOSE SERPL-MCNC: 192 MG/DL — HIGH (ref 70–99)
HCT VFR BLD CALC: 26.3 % — LOW (ref 34.5–45)
HGB BLD-MCNC: 9.2 G/DL — LOW (ref 11.5–15.5)
MAGNESIUM SERPL-MCNC: 1.5 MG/DL — LOW (ref 1.6–2.6)
MCHC RBC-ENTMCNC: 28.4 PG — SIGNIFICANT CHANGE UP (ref 27–34)
MCHC RBC-ENTMCNC: 35 % — SIGNIFICANT CHANGE UP (ref 32–36)
MCV RBC AUTO: 81.2 FL — SIGNIFICANT CHANGE UP (ref 80–100)
PHOSPHATE SERPL-MCNC: 2.9 MG/DL — SIGNIFICANT CHANGE UP (ref 2.5–4.5)
PLATELET # BLD AUTO: 515 K/UL — HIGH (ref 150–400)
PMV BLD: 9.8 FL — SIGNIFICANT CHANGE UP (ref 7–13)
POTASSIUM SERPL-MCNC: 3.8 MMOL/L — SIGNIFICANT CHANGE UP (ref 3.5–5.3)
POTASSIUM SERPL-SCNC: 3.8 MMOL/L — SIGNIFICANT CHANGE UP (ref 3.5–5.3)
RBC # BLD: 3.24 M/UL — LOW (ref 3.8–5.2)
RBC # FLD: 16.3 % — HIGH (ref 10.3–14.5)
SODIUM SERPL-SCNC: 139 MMOL/L — SIGNIFICANT CHANGE UP (ref 135–145)
SPECIMEN SOURCE: SIGNIFICANT CHANGE UP
WBC # BLD: 7.43 K/UL — SIGNIFICANT CHANGE UP (ref 3.8–10.5)
WBC # FLD AUTO: 7.43 K/UL — SIGNIFICANT CHANGE UP (ref 3.8–10.5)

## 2017-01-14 PROCEDURE — 99233 SBSQ HOSP IP/OBS HIGH 50: CPT | Mod: GC

## 2017-01-14 PROCEDURE — 99232 SBSQ HOSP IP/OBS MODERATE 35: CPT

## 2017-01-14 RX ORDER — MAGNESIUM SULFATE 500 MG/ML
1 VIAL (ML) INJECTION ONCE
Qty: 0 | Refills: 0 | Status: COMPLETED | OUTPATIENT
Start: 2017-01-14 | End: 2017-01-14

## 2017-01-14 RX ADMIN — HEPARIN SODIUM 5000 UNIT(S): 5000 INJECTION INTRAVENOUS; SUBCUTANEOUS at 11:41

## 2017-01-14 RX ADMIN — QUETIAPINE FUMARATE 200 MILLIGRAM(S): 200 TABLET, FILM COATED ORAL at 23:29

## 2017-01-14 RX ADMIN — HEPARIN SODIUM 5000 UNIT(S): 5000 INJECTION INTRAVENOUS; SUBCUTANEOUS at 01:47

## 2017-01-14 RX ADMIN — ERTAPENEM SODIUM 120 MILLIGRAM(S): 1 INJECTION, POWDER, LYOPHILIZED, FOR SOLUTION INTRAMUSCULAR; INTRAVENOUS at 17:26

## 2017-01-14 RX ADMIN — Medication 250 MILLIGRAM(S): at 18:05

## 2017-01-14 RX ADMIN — HEPARIN SODIUM 5000 UNIT(S): 5000 INJECTION INTRAVENOUS; SUBCUTANEOUS at 20:07

## 2017-01-14 RX ADMIN — Medication 1 TABLET(S): at 11:42

## 2017-01-14 RX ADMIN — Medication 4: at 13:25

## 2017-01-14 RX ADMIN — Medication 1 APPLICATION(S): at 11:42

## 2017-01-14 RX ADMIN — Medication 250 MILLIGRAM(S): at 05:37

## 2017-01-14 RX ADMIN — ATORVASTATIN CALCIUM 40 MILLIGRAM(S): 80 TABLET, FILM COATED ORAL at 22:15

## 2017-01-14 RX ADMIN — Medication 100 GRAM(S): at 23:29

## 2017-01-14 RX ADMIN — Medication 4: at 18:05

## 2017-01-15 LAB
METHOD TYPE: SIGNIFICANT CHANGE UP
ORGANISM # SPEC MICROSCOPIC CNT: SIGNIFICANT CHANGE UP
VANCOMYCIN TROUGH SERPL-MCNC: 17.7 UG/ML — SIGNIFICANT CHANGE UP (ref 10–20)

## 2017-01-15 PROCEDURE — 99232 SBSQ HOSP IP/OBS MODERATE 35: CPT | Mod: GC

## 2017-01-15 RX ORDER — ERTAPENEM SODIUM 1 G/1
1 INJECTION, POWDER, LYOPHILIZED, FOR SOLUTION INTRAMUSCULAR; INTRAVENOUS EVERY 24 HOURS
Qty: 0 | Refills: 0 | Status: DISCONTINUED | OUTPATIENT
Start: 2017-01-15 | End: 2017-01-17

## 2017-01-15 RX ORDER — ERTAPENEM SODIUM 1 G/1
1 INJECTION, POWDER, LYOPHILIZED, FOR SOLUTION INTRAMUSCULAR; INTRAVENOUS EVERY 24 HOURS
Qty: 0 | Refills: 0 | Status: DISCONTINUED | OUTPATIENT
Start: 2017-01-15 | End: 2017-01-15

## 2017-01-15 RX ADMIN — Medication 2: at 12:50

## 2017-01-15 RX ADMIN — HEPARIN SODIUM 5000 UNIT(S): 5000 INJECTION INTRAVENOUS; SUBCUTANEOUS at 12:50

## 2017-01-15 RX ADMIN — Medication 250 MILLIGRAM(S): at 06:25

## 2017-01-15 RX ADMIN — HEPARIN SODIUM 5000 UNIT(S): 5000 INJECTION INTRAVENOUS; SUBCUTANEOUS at 01:52

## 2017-01-15 RX ADMIN — Medication 1 TABLET(S): at 12:50

## 2017-01-15 RX ADMIN — Medication 1 APPLICATION(S): at 12:48

## 2017-01-16 LAB
-  AMIKACIN: SIGNIFICANT CHANGE UP
-  AMIKACIN: SIGNIFICANT CHANGE UP
-  AMPICILLIN/SULBACTAM: SIGNIFICANT CHANGE UP
-  AMPICILLIN/SULBACTAM: SIGNIFICANT CHANGE UP
-  AMPICILLIN: SIGNIFICANT CHANGE UP
-  AMPICILLIN: SIGNIFICANT CHANGE UP
-  AZTREONAM: SIGNIFICANT CHANGE UP
-  CEFAZOLIN: SIGNIFICANT CHANGE UP
-  CEFEPIME: SIGNIFICANT CHANGE UP
-  CEFEPIME: SIGNIFICANT CHANGE UP
-  CEFOXITIN: SIGNIFICANT CHANGE UP
-  CEFTAZIDIME: SIGNIFICANT CHANGE UP
-  CEFTAZIDIME: SIGNIFICANT CHANGE UP
-  CEFTRIAXONE: SIGNIFICANT CHANGE UP
-  CIPROFLOXACIN: SIGNIFICANT CHANGE UP
-  ERTAPENEM: SIGNIFICANT CHANGE UP
-  GENTAMICIN: SIGNIFICANT CHANGE UP
-  GENTAMICIN: SIGNIFICANT CHANGE UP
-  IMIPENEM: SIGNIFICANT CHANGE UP
-  LEVOFLOXACIN: SIGNIFICANT CHANGE UP
-  LEVOFLOXACIN: SIGNIFICANT CHANGE UP
-  MEROPENEM: SIGNIFICANT CHANGE UP
-  MEROPENEM: SIGNIFICANT CHANGE UP
-  PIPERACILLIN/TAZOBACTAM: SIGNIFICANT CHANGE UP
-  TETRACYCLINE: SIGNIFICANT CHANGE UP
-  TIGECYCLINE: SIGNIFICANT CHANGE UP
-  TOBRAMYCIN: SIGNIFICANT CHANGE UP
-  TOBRAMYCIN: SIGNIFICANT CHANGE UP
-  TRIMETHOPRIM/SULFAMETHOXAZOLE: SIGNIFICANT CHANGE UP
-  VANCOMYCIN: SIGNIFICANT CHANGE UP
BACTERIA BLD CULT: SIGNIFICANT CHANGE UP
BACTERIA BLD CULT: SIGNIFICANT CHANGE UP
BACTERIA WND CULT: SIGNIFICANT CHANGE UP
METHOD TYPE: SIGNIFICANT CHANGE UP
METHOD TYPE: SIGNIFICANT CHANGE UP
ORGANISM # SPEC MICROSCOPIC CNT: SIGNIFICANT CHANGE UP

## 2017-01-16 PROCEDURE — 99232 SBSQ HOSP IP/OBS MODERATE 35: CPT | Mod: GC

## 2017-01-16 RX ADMIN — QUETIAPINE FUMARATE 200 MILLIGRAM(S): 200 TABLET, FILM COATED ORAL at 00:38

## 2017-01-16 RX ADMIN — ATORVASTATIN CALCIUM 40 MILLIGRAM(S): 80 TABLET, FILM COATED ORAL at 23:00

## 2017-01-16 RX ADMIN — ERTAPENEM SODIUM 1 GRAM(S): 1 INJECTION, POWDER, LYOPHILIZED, FOR SOLUTION INTRAMUSCULAR; INTRAVENOUS at 00:40

## 2017-01-16 RX ADMIN — Medication 2 TABLET(S): at 01:58

## 2017-01-16 RX ADMIN — HEPARIN SODIUM 5000 UNIT(S): 5000 INJECTION INTRAVENOUS; SUBCUTANEOUS at 00:40

## 2017-01-16 RX ADMIN — ATORVASTATIN CALCIUM 40 MILLIGRAM(S): 80 TABLET, FILM COATED ORAL at 00:39

## 2017-01-16 RX ADMIN — HEPARIN SODIUM 5000 UNIT(S): 5000 INJECTION INTRAVENOUS; SUBCUTANEOUS at 08:42

## 2017-01-16 RX ADMIN — Medication 1 TABLET(S): at 13:00

## 2017-01-16 RX ADMIN — Medication 1 APPLICATION(S): at 12:01

## 2017-01-16 RX ADMIN — Medication 2 TABLET(S): at 13:00

## 2017-01-16 RX ADMIN — HEPARIN SODIUM 5000 UNIT(S): 5000 INJECTION INTRAVENOUS; SUBCUTANEOUS at 17:27

## 2017-01-16 RX ADMIN — Medication 2: at 17:26

## 2017-01-16 RX ADMIN — QUETIAPINE FUMARATE 200 MILLIGRAM(S): 200 TABLET, FILM COATED ORAL at 23:00

## 2017-01-17 LAB
ALBUMIN SERPL ELPH-MCNC: 1.9 G/DL — LOW (ref 3.3–5)
ALP SERPL-CCNC: 54 U/L — SIGNIFICANT CHANGE UP (ref 40–120)
ALT FLD-CCNC: 9 U/L — SIGNIFICANT CHANGE UP (ref 4–33)
AST SERPL-CCNC: 14 U/L — SIGNIFICANT CHANGE UP (ref 4–32)
BILIRUB SERPL-MCNC: 0.3 MG/DL — SIGNIFICANT CHANGE UP (ref 0.2–1.2)
BUN SERPL-MCNC: 5 MG/DL — LOW (ref 7–23)
CALCIUM SERPL-MCNC: 7.3 MG/DL — LOW (ref 8.4–10.5)
CHLORIDE SERPL-SCNC: 109 MMOL/L — HIGH (ref 98–107)
CO2 SERPL-SCNC: 21 MMOL/L — LOW (ref 22–31)
CREAT SERPL-MCNC: 0.47 MG/DL — LOW (ref 0.5–1.3)
GLUCOSE SERPL-MCNC: 108 MG/DL — HIGH (ref 70–99)
HCT VFR BLD CALC: 22.4 % — LOW (ref 34.5–45)
HGB BLD-MCNC: 7.7 G/DL — LOW (ref 11.5–15.5)
MAGNESIUM SERPL-MCNC: 1.4 MG/DL — LOW (ref 1.6–2.6)
MCHC RBC-ENTMCNC: 28 PG — SIGNIFICANT CHANGE UP (ref 27–34)
MCHC RBC-ENTMCNC: 34.4 % — SIGNIFICANT CHANGE UP (ref 32–36)
MCV RBC AUTO: 81.5 FL — SIGNIFICANT CHANGE UP (ref 80–100)
PHOSPHATE SERPL-MCNC: 3 MG/DL — SIGNIFICANT CHANGE UP (ref 2.5–4.5)
PLATELET # BLD AUTO: 398 K/UL — SIGNIFICANT CHANGE UP (ref 150–400)
PMV BLD: 8.8 FL — SIGNIFICANT CHANGE UP (ref 7–13)
POTASSIUM SERPL-MCNC: 3.6 MMOL/L — SIGNIFICANT CHANGE UP (ref 3.5–5.3)
POTASSIUM SERPL-SCNC: 3.6 MMOL/L — SIGNIFICANT CHANGE UP (ref 3.5–5.3)
PROT SERPL-MCNC: 5.4 G/DL — LOW (ref 6–8.3)
RBC # BLD: 2.75 M/UL — LOW (ref 3.8–5.2)
RBC # FLD: 17.2 % — HIGH (ref 10.3–14.5)
SODIUM SERPL-SCNC: 143 MMOL/L — SIGNIFICANT CHANGE UP (ref 135–145)
WBC # BLD: 4.61 K/UL — SIGNIFICANT CHANGE UP (ref 3.8–10.5)
WBC # FLD AUTO: 4.61 K/UL — SIGNIFICANT CHANGE UP (ref 3.8–10.5)

## 2017-01-17 PROCEDURE — 76937 US GUIDE VASCULAR ACCESS: CPT | Mod: 26

## 2017-01-17 PROCEDURE — 77001 FLUOROGUIDE FOR VEIN DEVICE: CPT | Mod: 26,GC

## 2017-01-17 PROCEDURE — 99232 SBSQ HOSP IP/OBS MODERATE 35: CPT

## 2017-01-17 PROCEDURE — 99233 SBSQ HOSP IP/OBS HIGH 50: CPT | Mod: GC

## 2017-01-17 PROCEDURE — 36569 INSJ PICC 5 YR+ W/O IMAGING: CPT

## 2017-01-17 RX ORDER — VANCOMYCIN HCL 1 G
1000 VIAL (EA) INTRAVENOUS EVERY 24 HOURS
Qty: 0 | Refills: 0 | Status: DISCONTINUED | OUTPATIENT
Start: 2017-01-18 | End: 2017-01-19

## 2017-01-17 RX ORDER — POLYMYXIN B SULFATE 500000 [USP'U]/1
575000 INJECTION, POWDER, LYOPHILIZED, FOR SOLUTION INTRAMUSCULAR; INTRATHECAL; INTRAVENOUS; OPHTHALMIC EVERY 12 HOURS
Qty: 0 | Refills: 0 | Status: DISCONTINUED | OUTPATIENT
Start: 2017-01-17 | End: 2017-01-24

## 2017-01-17 RX ORDER — MAGNESIUM OXIDE 400 MG ORAL TABLET 241.3 MG
400 TABLET ORAL ONCE
Qty: 0 | Refills: 0 | Status: COMPLETED | OUTPATIENT
Start: 2017-01-17 | End: 2017-01-17

## 2017-01-17 RX ORDER — VANCOMYCIN HCL 1 G
1000 VIAL (EA) INTRAVENOUS ONCE
Qty: 0 | Refills: 0 | Status: COMPLETED | OUTPATIENT
Start: 2017-01-17 | End: 2017-01-17

## 2017-01-17 RX ORDER — VANCOMYCIN HCL 1 G
VIAL (EA) INTRAVENOUS
Qty: 0 | Refills: 0 | Status: DISCONTINUED | OUTPATIENT
Start: 2017-01-17 | End: 2017-01-19

## 2017-01-17 RX ADMIN — POLYMYXIN B SULFATE 500 UNIT(S): 500000 INJECTION, POWDER, LYOPHILIZED, FOR SOLUTION INTRAMUSCULAR; INTRATHECAL; INTRAVENOUS; OPHTHALMIC at 17:41

## 2017-01-17 RX ADMIN — HEPARIN SODIUM 5000 UNIT(S): 5000 INJECTION INTRAVENOUS; SUBCUTANEOUS at 19:37

## 2017-01-17 RX ADMIN — Medication 1 TABLET(S): at 12:48

## 2017-01-17 RX ADMIN — ERTAPENEM SODIUM 1 GRAM(S): 1 INJECTION, POWDER, LYOPHILIZED, FOR SOLUTION INTRAMUSCULAR; INTRAVENOUS at 01:59

## 2017-01-17 RX ADMIN — ATORVASTATIN CALCIUM 40 MILLIGRAM(S): 80 TABLET, FILM COATED ORAL at 21:18

## 2017-01-17 RX ADMIN — Medication 2: at 17:42

## 2017-01-17 RX ADMIN — Medication 250 MILLIGRAM(S): at 16:31

## 2017-01-17 RX ADMIN — MAGNESIUM OXIDE 400 MG ORAL TABLET 400 MILLIGRAM(S): 241.3 TABLET ORAL at 19:37

## 2017-01-17 RX ADMIN — Medication 1 APPLICATION(S): at 12:48

## 2017-01-17 RX ADMIN — QUETIAPINE FUMARATE 200 MILLIGRAM(S): 200 TABLET, FILM COATED ORAL at 21:18

## 2017-01-17 RX ADMIN — HEPARIN SODIUM 5000 UNIT(S): 5000 INJECTION INTRAVENOUS; SUBCUTANEOUS at 12:48

## 2017-01-17 RX ADMIN — Medication 2 TABLET(S): at 01:59

## 2017-01-17 RX ADMIN — Medication 2 TABLET(S): at 12:48

## 2017-01-18 LAB
-  AMIKACIN: SIGNIFICANT CHANGE UP
-  AMIKACIN: SIGNIFICANT CHANGE UP
-  AMPICILLIN/SULBACTAM: SIGNIFICANT CHANGE UP
-  AMPICILLIN/SULBACTAM: SIGNIFICANT CHANGE UP
-  AMPICILLIN: SIGNIFICANT CHANGE UP
-  AMPICILLIN: SIGNIFICANT CHANGE UP
-  AZTREONAM: SIGNIFICANT CHANGE UP
-  CEFAZOLIN: SIGNIFICANT CHANGE UP
-  CEFEPIME: SIGNIFICANT CHANGE UP
-  CEFEPIME: SIGNIFICANT CHANGE UP
-  CEFOXITIN: SIGNIFICANT CHANGE UP
-  CEFTAZIDIME: SIGNIFICANT CHANGE UP
-  CEFTAZIDIME: SIGNIFICANT CHANGE UP
-  CEFTRIAXONE: SIGNIFICANT CHANGE UP
-  CIPROFLOXACIN: SIGNIFICANT CHANGE UP
-  ERTAPENEM: SIGNIFICANT CHANGE UP
-  GENTAMICIN: SIGNIFICANT CHANGE UP
-  GENTAMICIN: SIGNIFICANT CHANGE UP
-  IMIPENEM: SIGNIFICANT CHANGE UP
-  LEVOFLOXACIN: SIGNIFICANT CHANGE UP
-  LEVOFLOXACIN: SIGNIFICANT CHANGE UP
-  MEROPENEM: SIGNIFICANT CHANGE UP
-  MEROPENEM: SIGNIFICANT CHANGE UP
-  PIPERACILLIN/TAZOBACTAM: SIGNIFICANT CHANGE UP
-  TETRACYCLINE: SIGNIFICANT CHANGE UP
-  TIGECYCLINE: SIGNIFICANT CHANGE UP
-  TOBRAMYCIN: SIGNIFICANT CHANGE UP
-  TOBRAMYCIN: SIGNIFICANT CHANGE UP
-  TRIMETHOPRIM/SULFAMETHOXAZOLE: SIGNIFICANT CHANGE UP
-  VANCOMYCIN: SIGNIFICANT CHANGE UP
BACTERIA WND CULT: SIGNIFICANT CHANGE UP

## 2017-01-18 PROCEDURE — 99233 SBSQ HOSP IP/OBS HIGH 50: CPT | Mod: GC

## 2017-01-18 RX ORDER — MAGNESIUM SULFATE 500 MG/ML
2 VIAL (ML) INJECTION ONCE
Qty: 0 | Refills: 0 | Status: COMPLETED | OUTPATIENT
Start: 2017-01-18 | End: 2017-01-18

## 2017-01-18 RX ADMIN — ATORVASTATIN CALCIUM 40 MILLIGRAM(S): 80 TABLET, FILM COATED ORAL at 22:25

## 2017-01-18 RX ADMIN — Medication 1 TABLET(S): at 13:23

## 2017-01-18 RX ADMIN — HEPARIN SODIUM 5000 UNIT(S): 5000 INJECTION INTRAVENOUS; SUBCUTANEOUS at 13:23

## 2017-01-18 RX ADMIN — Medication 1 APPLICATION(S): at 12:00

## 2017-01-18 RX ADMIN — POLYMYXIN B SULFATE 500 UNIT(S): 500000 INJECTION, POWDER, LYOPHILIZED, FOR SOLUTION INTRAMUSCULAR; INTRATHECAL; INTRAVENOUS; OPHTHALMIC at 06:05

## 2017-01-18 RX ADMIN — Medication: at 18:15

## 2017-01-18 RX ADMIN — POLYMYXIN B SULFATE 500 UNIT(S): 500000 INJECTION, POWDER, LYOPHILIZED, FOR SOLUTION INTRAMUSCULAR; INTRATHECAL; INTRAVENOUS; OPHTHALMIC at 17:22

## 2017-01-18 RX ADMIN — HEPARIN SODIUM 5000 UNIT(S): 5000 INJECTION INTRAVENOUS; SUBCUTANEOUS at 04:35

## 2017-01-18 RX ADMIN — Medication 250 MILLIGRAM(S): at 16:13

## 2017-01-18 RX ADMIN — Medication 25 GRAM(S): at 10:51

## 2017-01-18 RX ADMIN — QUETIAPINE FUMARATE 200 MILLIGRAM(S): 200 TABLET, FILM COATED ORAL at 22:25

## 2017-01-18 RX ADMIN — HEPARIN SODIUM 5000 UNIT(S): 5000 INJECTION INTRAVENOUS; SUBCUTANEOUS at 21:42

## 2017-01-19 LAB
BUN SERPL-MCNC: 7 MG/DL — SIGNIFICANT CHANGE UP (ref 7–23)
CALCIUM SERPL-MCNC: 8.6 MG/DL — SIGNIFICANT CHANGE UP (ref 8.4–10.5)
CHLORIDE SERPL-SCNC: 100 MMOL/L — SIGNIFICANT CHANGE UP (ref 98–107)
CO2 SERPL-SCNC: 24 MMOL/L — SIGNIFICANT CHANGE UP (ref 22–31)
CREAT SERPL-MCNC: 0.61 MG/DL — SIGNIFICANT CHANGE UP (ref 0.5–1.3)
GLUCOSE SERPL-MCNC: 150 MG/DL — HIGH (ref 70–99)
HCT VFR BLD CALC: 25.9 % — LOW (ref 34.5–45)
HGB BLD-MCNC: 8.8 G/DL — LOW (ref 11.5–15.5)
MAGNESIUM SERPL-MCNC: 1.6 MG/DL — SIGNIFICANT CHANGE UP (ref 1.6–2.6)
MCHC RBC-ENTMCNC: 28.3 PG — SIGNIFICANT CHANGE UP (ref 27–34)
MCHC RBC-ENTMCNC: 34 % — SIGNIFICANT CHANGE UP (ref 32–36)
MCV RBC AUTO: 83.3 FL — SIGNIFICANT CHANGE UP (ref 80–100)
PHOSPHATE SERPL-MCNC: 4 MG/DL — SIGNIFICANT CHANGE UP (ref 2.5–4.5)
PLATELET # BLD AUTO: 569 K/UL — HIGH (ref 150–400)
PMV BLD: 9.2 FL — SIGNIFICANT CHANGE UP (ref 7–13)
POTASSIUM SERPL-MCNC: 4.4 MMOL/L — SIGNIFICANT CHANGE UP (ref 3.5–5.3)
POTASSIUM SERPL-SCNC: 4.4 MMOL/L — SIGNIFICANT CHANGE UP (ref 3.5–5.3)
RBC # BLD: 3.11 M/UL — LOW (ref 3.8–5.2)
RBC # FLD: 17.4 % — HIGH (ref 10.3–14.5)
SODIUM SERPL-SCNC: 136 MMOL/L — SIGNIFICANT CHANGE UP (ref 135–145)
VANCOMYCIN TROUGH SERPL-MCNC: 8.9 UG/ML — LOW (ref 10–20)
WBC # BLD: 7.64 K/UL — SIGNIFICANT CHANGE UP (ref 3.8–10.5)
WBC # FLD AUTO: 7.64 K/UL — SIGNIFICANT CHANGE UP (ref 3.8–10.5)

## 2017-01-19 PROCEDURE — 99232 SBSQ HOSP IP/OBS MODERATE 35: CPT | Mod: GC

## 2017-01-19 RX ORDER — GLYCERIN ADULT
1 SUPPOSITORY, RECTAL RECTAL ONCE
Qty: 0 | Refills: 0 | Status: COMPLETED | OUTPATIENT
Start: 2017-01-19 | End: 2017-01-19

## 2017-01-19 RX ORDER — ACETAMINOPHEN 500 MG
650 TABLET ORAL EVERY 6 HOURS
Qty: 0 | Refills: 0 | Status: DISCONTINUED | OUTPATIENT
Start: 2017-01-19 | End: 2017-02-04

## 2017-01-19 RX ORDER — VANCOMYCIN HCL 1 G
1000 VIAL (EA) INTRAVENOUS EVERY 12 HOURS
Qty: 0 | Refills: 0 | Status: DISCONTINUED | OUTPATIENT
Start: 2017-01-19 | End: 2017-01-21

## 2017-01-19 RX ORDER — ALTEPLASE 100 MG
2 KIT INTRAVENOUS ONCE
Qty: 0 | Refills: 0 | Status: DISCONTINUED | OUTPATIENT
Start: 2017-01-19 | End: 2017-01-19

## 2017-01-19 RX ORDER — SENNA PLUS 8.6 MG/1
2 TABLET ORAL AT BEDTIME
Qty: 0 | Refills: 0 | Status: DISCONTINUED | OUTPATIENT
Start: 2017-01-19 | End: 2017-02-04

## 2017-01-19 RX ORDER — DOCUSATE SODIUM 100 MG
100 CAPSULE ORAL THREE TIMES A DAY
Qty: 0 | Refills: 0 | Status: DISCONTINUED | OUTPATIENT
Start: 2017-01-19 | End: 2017-02-04

## 2017-01-19 RX ADMIN — ATORVASTATIN CALCIUM 40 MILLIGRAM(S): 80 TABLET, FILM COATED ORAL at 22:13

## 2017-01-19 RX ADMIN — Medication 1 SUPPOSITORY(S): at 14:24

## 2017-01-19 RX ADMIN — HEPARIN SODIUM 5000 UNIT(S): 5000 INJECTION INTRAVENOUS; SUBCUTANEOUS at 22:13

## 2017-01-19 RX ADMIN — QUETIAPINE FUMARATE 200 MILLIGRAM(S): 200 TABLET, FILM COATED ORAL at 22:13

## 2017-01-19 RX ADMIN — HEPARIN SODIUM 5000 UNIT(S): 5000 INJECTION INTRAVENOUS; SUBCUTANEOUS at 13:05

## 2017-01-19 RX ADMIN — Medication 4: at 18:39

## 2017-01-19 RX ADMIN — POLYMYXIN B SULFATE 500 UNIT(S): 500000 INJECTION, POWDER, LYOPHILIZED, FOR SOLUTION INTRAMUSCULAR; INTRATHECAL; INTRAVENOUS; OPHTHALMIC at 06:11

## 2017-01-19 RX ADMIN — Medication 2: at 13:04

## 2017-01-19 RX ADMIN — Medication 1 TABLET(S): at 13:05

## 2017-01-19 RX ADMIN — Medication 1 APPLICATION(S): at 13:05

## 2017-01-19 RX ADMIN — Medication 100 MILLIGRAM(S): at 22:13

## 2017-01-19 RX ADMIN — HEPARIN SODIUM 5000 UNIT(S): 5000 INJECTION INTRAVENOUS; SUBCUTANEOUS at 05:10

## 2017-01-19 RX ADMIN — POLYMYXIN B SULFATE 500 UNIT(S): 500000 INJECTION, POWDER, LYOPHILIZED, FOR SOLUTION INTRAMUSCULAR; INTRATHECAL; INTRAVENOUS; OPHTHALMIC at 17:33

## 2017-01-19 RX ADMIN — Medication 100 MILLIGRAM(S): at 13:05

## 2017-01-19 RX ADMIN — Medication 166.67 MILLIGRAM(S): at 14:24

## 2017-01-19 RX ADMIN — SENNA PLUS 2 TABLET(S): 8.6 TABLET ORAL at 22:13

## 2017-01-20 LAB
BASOPHILS # BLD AUTO: 0.04 K/UL — SIGNIFICANT CHANGE UP (ref 0–0.2)
BASOPHILS NFR BLD AUTO: 0.4 % — SIGNIFICANT CHANGE UP (ref 0–2)
BUN SERPL-MCNC: 7 MG/DL — SIGNIFICANT CHANGE UP (ref 7–23)
CALCIUM SERPL-MCNC: 8.8 MG/DL — SIGNIFICANT CHANGE UP (ref 8.4–10.5)
CHLORIDE SERPL-SCNC: 101 MMOL/L — SIGNIFICANT CHANGE UP (ref 98–107)
CO2 SERPL-SCNC: 22 MMOL/L — SIGNIFICANT CHANGE UP (ref 22–31)
CREAT SERPL-MCNC: 0.66 MG/DL — SIGNIFICANT CHANGE UP (ref 0.5–1.3)
EOSINOPHIL # BLD AUTO: 0.45 K/UL — SIGNIFICANT CHANGE UP (ref 0–0.5)
EOSINOPHIL NFR BLD AUTO: 4.9 % — SIGNIFICANT CHANGE UP (ref 0–6)
GLUCOSE SERPL-MCNC: 141 MG/DL — HIGH (ref 70–99)
HCT VFR BLD CALC: 26.9 % — LOW (ref 34.5–45)
HGB BLD-MCNC: 9.1 G/DL — LOW (ref 11.5–15.5)
IMM GRANULOCYTES NFR BLD AUTO: 0.4 % — SIGNIFICANT CHANGE UP (ref 0–1.5)
LYMPHOCYTES # BLD AUTO: 3.54 K/UL — HIGH (ref 1–3.3)
LYMPHOCYTES # BLD AUTO: 38.7 % — SIGNIFICANT CHANGE UP (ref 13–44)
MAGNESIUM SERPL-MCNC: 1.5 MG/DL — LOW (ref 1.6–2.6)
MCHC RBC-ENTMCNC: 27.9 PG — SIGNIFICANT CHANGE UP (ref 27–34)
MCHC RBC-ENTMCNC: 33.8 % — SIGNIFICANT CHANGE UP (ref 32–36)
MCV RBC AUTO: 82.5 FL — SIGNIFICANT CHANGE UP (ref 80–100)
MONOCYTES # BLD AUTO: 1.19 K/UL — HIGH (ref 0–0.9)
MONOCYTES NFR BLD AUTO: 13 % — SIGNIFICANT CHANGE UP (ref 2–14)
NEUTROPHILS # BLD AUTO: 3.89 K/UL — SIGNIFICANT CHANGE UP (ref 1.8–7.4)
NEUTROPHILS NFR BLD AUTO: 42.6 % — LOW (ref 43–77)
PHOSPHATE SERPL-MCNC: 4.5 MG/DL — SIGNIFICANT CHANGE UP (ref 2.5–4.5)
PLATELET # BLD AUTO: 666 K/UL — HIGH (ref 150–400)
PMV BLD: 9.2 FL — SIGNIFICANT CHANGE UP (ref 7–13)
POTASSIUM SERPL-MCNC: 4.4 MMOL/L — SIGNIFICANT CHANGE UP (ref 3.5–5.3)
POTASSIUM SERPL-SCNC: 4.4 MMOL/L — SIGNIFICANT CHANGE UP (ref 3.5–5.3)
RBC # BLD: 3.26 M/UL — LOW (ref 3.8–5.2)
RBC # FLD: 17.2 % — HIGH (ref 10.3–14.5)
SODIUM SERPL-SCNC: 137 MMOL/L — SIGNIFICANT CHANGE UP (ref 135–145)
WBC # BLD: 9.15 K/UL — SIGNIFICANT CHANGE UP (ref 3.8–10.5)
WBC # FLD AUTO: 9.15 K/UL — SIGNIFICANT CHANGE UP (ref 3.8–10.5)

## 2017-01-20 PROCEDURE — 99232 SBSQ HOSP IP/OBS MODERATE 35: CPT

## 2017-01-20 PROCEDURE — 99233 SBSQ HOSP IP/OBS HIGH 50: CPT | Mod: GC

## 2017-01-20 PROCEDURE — 97606 NEG PRS WND THER DME>50 SQCM: CPT | Mod: 59

## 2017-01-20 PROCEDURE — 97597 DBRDMT OPN WND 1ST 20 CM/<: CPT

## 2017-01-20 RX ORDER — FLUCONAZOLE 150 MG/1
200 TABLET ORAL DAILY
Qty: 0 | Refills: 0 | Status: DISCONTINUED | OUTPATIENT
Start: 2017-01-20 | End: 2017-02-04

## 2017-01-20 RX ORDER — MAGNESIUM SULFATE 500 MG/ML
2 VIAL (ML) INJECTION ONCE
Qty: 0 | Refills: 0 | Status: COMPLETED | OUTPATIENT
Start: 2017-01-20 | End: 2017-01-20

## 2017-01-20 RX ADMIN — Medication 166.67 MILLIGRAM(S): at 01:22

## 2017-01-20 RX ADMIN — Medication 25 GRAM(S): at 12:47

## 2017-01-20 RX ADMIN — ATORVASTATIN CALCIUM 40 MILLIGRAM(S): 80 TABLET, FILM COATED ORAL at 22:55

## 2017-01-20 RX ADMIN — Medication 1 TABLET(S): at 14:22

## 2017-01-20 RX ADMIN — HEPARIN SODIUM 5000 UNIT(S): 5000 INJECTION INTRAVENOUS; SUBCUTANEOUS at 06:24

## 2017-01-20 RX ADMIN — QUETIAPINE FUMARATE 200 MILLIGRAM(S): 200 TABLET, FILM COATED ORAL at 22:55

## 2017-01-20 RX ADMIN — HEPARIN SODIUM 5000 UNIT(S): 5000 INJECTION INTRAVENOUS; SUBCUTANEOUS at 14:22

## 2017-01-20 RX ADMIN — FLUCONAZOLE 200 MILLIGRAM(S): 150 TABLET ORAL at 22:55

## 2017-01-20 RX ADMIN — Medication 1 APPLICATION(S): at 12:48

## 2017-01-20 RX ADMIN — Medication 2: at 08:53

## 2017-01-20 RX ADMIN — Medication 100 MILLIGRAM(S): at 06:24

## 2017-01-20 RX ADMIN — Medication 2: at 12:47

## 2017-01-20 RX ADMIN — Medication 166.67 MILLIGRAM(S): at 14:22

## 2017-01-20 RX ADMIN — HEPARIN SODIUM 5000 UNIT(S): 5000 INJECTION INTRAVENOUS; SUBCUTANEOUS at 22:55

## 2017-01-20 RX ADMIN — POLYMYXIN B SULFATE 500 UNIT(S): 500000 INJECTION, POWDER, LYOPHILIZED, FOR SOLUTION INTRAMUSCULAR; INTRATHECAL; INTRAVENOUS; OPHTHALMIC at 06:24

## 2017-01-20 RX ADMIN — POLYMYXIN B SULFATE 500 UNIT(S): 500000 INJECTION, POWDER, LYOPHILIZED, FOR SOLUTION INTRAMUSCULAR; INTRATHECAL; INTRAVENOUS; OPHTHALMIC at 17:17

## 2017-01-21 LAB
HCT VFR BLD CALC: 26.9 % — LOW (ref 34.5–45)
HGB BLD-MCNC: 9.1 G/DL — LOW (ref 11.5–15.5)
MCHC RBC-ENTMCNC: 28.3 PG — SIGNIFICANT CHANGE UP (ref 27–34)
MCHC RBC-ENTMCNC: 33.8 % — SIGNIFICANT CHANGE UP (ref 32–36)
MCV RBC AUTO: 83.5 FL — SIGNIFICANT CHANGE UP (ref 80–100)
PLATELET # BLD AUTO: 446 K/UL — HIGH (ref 150–400)
PMV BLD: 9.4 FL — SIGNIFICANT CHANGE UP (ref 7–13)
RBC # BLD: 3.22 M/UL — LOW (ref 3.8–5.2)
RBC # FLD: 17.2 % — HIGH (ref 10.3–14.5)
VANCOMYCIN TROUGH SERPL-MCNC: 24.3 UG/ML — HIGH (ref 10–20)
WBC # BLD: 8.47 K/UL — SIGNIFICANT CHANGE UP (ref 3.8–10.5)
WBC # FLD AUTO: 8.47 K/UL — SIGNIFICANT CHANGE UP (ref 3.8–10.5)

## 2017-01-21 PROCEDURE — 99233 SBSQ HOSP IP/OBS HIGH 50: CPT

## 2017-01-21 RX ORDER — VANCOMYCIN HCL 1 G
750 VIAL (EA) INTRAVENOUS EVERY 12 HOURS
Qty: 0 | Refills: 0 | Status: DISCONTINUED | OUTPATIENT
Start: 2017-01-21 | End: 2017-01-23

## 2017-01-21 RX ORDER — ASPIRIN/CALCIUM CARB/MAGNESIUM 324 MG
81 TABLET ORAL EVERY 6 HOURS
Qty: 0 | Refills: 0 | Status: DISCONTINUED | OUTPATIENT
Start: 2017-01-21 | End: 2017-02-04

## 2017-01-21 RX ADMIN — Medication 1 APPLICATION(S): at 12:44

## 2017-01-21 RX ADMIN — FLUCONAZOLE 200 MILLIGRAM(S): 150 TABLET ORAL at 12:43

## 2017-01-21 RX ADMIN — Medication 2: at 08:37

## 2017-01-21 RX ADMIN — Medication 4: at 17:41

## 2017-01-21 RX ADMIN — HEPARIN SODIUM 5000 UNIT(S): 5000 INJECTION INTRAVENOUS; SUBCUTANEOUS at 06:20

## 2017-01-21 RX ADMIN — POLYMYXIN B SULFATE 500 UNIT(S): 500000 INJECTION, POWDER, LYOPHILIZED, FOR SOLUTION INTRAMUSCULAR; INTRATHECAL; INTRAVENOUS; OPHTHALMIC at 06:20

## 2017-01-21 RX ADMIN — POLYMYXIN B SULFATE 500 UNIT(S): 500000 INJECTION, POWDER, LYOPHILIZED, FOR SOLUTION INTRAMUSCULAR; INTRATHECAL; INTRAVENOUS; OPHTHALMIC at 17:42

## 2017-01-21 RX ADMIN — Medication 150 MILLIGRAM(S): at 15:11

## 2017-01-21 RX ADMIN — ATORVASTATIN CALCIUM 40 MILLIGRAM(S): 80 TABLET, FILM COATED ORAL at 22:06

## 2017-01-21 RX ADMIN — HEPARIN SODIUM 5000 UNIT(S): 5000 INJECTION INTRAVENOUS; SUBCUTANEOUS at 22:06

## 2017-01-21 RX ADMIN — Medication 100 MILLIGRAM(S): at 13:19

## 2017-01-21 RX ADMIN — HEPARIN SODIUM 5000 UNIT(S): 5000 INJECTION INTRAVENOUS; SUBCUTANEOUS at 13:19

## 2017-01-21 RX ADMIN — QUETIAPINE FUMARATE 200 MILLIGRAM(S): 200 TABLET, FILM COATED ORAL at 22:06

## 2017-01-21 RX ADMIN — Medication 1 TABLET(S): at 12:43

## 2017-01-21 RX ADMIN — Medication 166.67 MILLIGRAM(S): at 02:51

## 2017-01-22 LAB
BUN SERPL-MCNC: 11 MG/DL — SIGNIFICANT CHANGE UP (ref 7–23)
CALCIUM SERPL-MCNC: 7.8 MG/DL — LOW (ref 8.4–10.5)
CHLORIDE SERPL-SCNC: 101 MMOL/L — SIGNIFICANT CHANGE UP (ref 98–107)
CO2 SERPL-SCNC: 23 MMOL/L — SIGNIFICANT CHANGE UP (ref 22–31)
CREAT SERPL-MCNC: 0.69 MG/DL — SIGNIFICANT CHANGE UP (ref 0.5–1.3)
GLUCOSE SERPL-MCNC: 234 MG/DL — HIGH (ref 70–99)
HCT VFR BLD CALC: 22.3 % — LOW (ref 34.5–45)
HGB BLD-MCNC: 7.5 G/DL — LOW (ref 11.5–15.5)
MAGNESIUM SERPL-MCNC: 1.3 MG/DL — LOW (ref 1.6–2.6)
MCHC RBC-ENTMCNC: 28 PG — SIGNIFICANT CHANGE UP (ref 27–34)
MCHC RBC-ENTMCNC: 33.6 % — SIGNIFICANT CHANGE UP (ref 32–36)
MCV RBC AUTO: 83.2 FL — SIGNIFICANT CHANGE UP (ref 80–100)
PHOSPHATE SERPL-MCNC: 3.8 MG/DL — SIGNIFICANT CHANGE UP (ref 2.5–4.5)
PLATELET # BLD AUTO: 552 K/UL — HIGH (ref 150–400)
PMV BLD: 9.5 FL — SIGNIFICANT CHANGE UP (ref 7–13)
POTASSIUM SERPL-MCNC: 3.9 MMOL/L — SIGNIFICANT CHANGE UP (ref 3.5–5.3)
POTASSIUM SERPL-SCNC: 3.9 MMOL/L — SIGNIFICANT CHANGE UP (ref 3.5–5.3)
RBC # BLD: 2.68 M/UL — LOW (ref 3.8–5.2)
RBC # FLD: 17 % — HIGH (ref 10.3–14.5)
SODIUM SERPL-SCNC: 136 MMOL/L — SIGNIFICANT CHANGE UP (ref 135–145)
WBC # BLD: 9.37 K/UL — SIGNIFICANT CHANGE UP (ref 3.8–10.5)
WBC # FLD AUTO: 9.37 K/UL — SIGNIFICANT CHANGE UP (ref 3.8–10.5)

## 2017-01-22 PROCEDURE — 99232 SBSQ HOSP IP/OBS MODERATE 35: CPT | Mod: GC

## 2017-01-22 RX ORDER — MAGNESIUM SULFATE 500 MG/ML
2 VIAL (ML) INJECTION ONCE
Qty: 0 | Refills: 0 | Status: COMPLETED | OUTPATIENT
Start: 2017-01-22 | End: 2017-01-22

## 2017-01-22 RX ADMIN — Medication 50 GRAM(S): at 10:34

## 2017-01-22 RX ADMIN — Medication 4: at 17:45

## 2017-01-22 RX ADMIN — Medication 1 TABLET(S): at 11:06

## 2017-01-22 RX ADMIN — Medication 150 MILLIGRAM(S): at 02:13

## 2017-01-22 RX ADMIN — Medication 650 MILLIGRAM(S): at 21:45

## 2017-01-22 RX ADMIN — POLYMYXIN B SULFATE 500 UNIT(S): 500000 INJECTION, POWDER, LYOPHILIZED, FOR SOLUTION INTRAMUSCULAR; INTRATHECAL; INTRAVENOUS; OPHTHALMIC at 06:14

## 2017-01-22 RX ADMIN — QUETIAPINE FUMARATE 200 MILLIGRAM(S): 200 TABLET, FILM COATED ORAL at 20:50

## 2017-01-22 RX ADMIN — FLUCONAZOLE 200 MILLIGRAM(S): 150 TABLET ORAL at 11:05

## 2017-01-22 RX ADMIN — HEPARIN SODIUM 5000 UNIT(S): 5000 INJECTION INTRAVENOUS; SUBCUTANEOUS at 20:50

## 2017-01-22 RX ADMIN — Medication 2: at 11:44

## 2017-01-22 RX ADMIN — Medication 2: at 08:48

## 2017-01-22 RX ADMIN — POLYMYXIN B SULFATE 500 UNIT(S): 500000 INJECTION, POWDER, LYOPHILIZED, FOR SOLUTION INTRAMUSCULAR; INTRATHECAL; INTRAVENOUS; OPHTHALMIC at 17:46

## 2017-01-22 RX ADMIN — ATORVASTATIN CALCIUM 40 MILLIGRAM(S): 80 TABLET, FILM COATED ORAL at 20:50

## 2017-01-22 RX ADMIN — Medication 1 APPLICATION(S): at 11:08

## 2017-01-22 RX ADMIN — HEPARIN SODIUM 5000 UNIT(S): 5000 INJECTION INTRAVENOUS; SUBCUTANEOUS at 06:14

## 2017-01-22 RX ADMIN — Medication 650 MILLIGRAM(S): at 20:45

## 2017-01-22 RX ADMIN — HEPARIN SODIUM 5000 UNIT(S): 5000 INJECTION INTRAVENOUS; SUBCUTANEOUS at 13:21

## 2017-01-22 RX ADMIN — Medication 150 MILLIGRAM(S): at 13:22

## 2017-01-22 RX ADMIN — Medication 650 MILLIGRAM(S): at 04:30

## 2017-01-22 RX ADMIN — Medication 650 MILLIGRAM(S): at 05:00

## 2017-01-22 NOTE — DISCHARGE NOTE ADULT - HOSPITAL COURSE
73 yr female w/ hx of DM, HTN, stage IV sacral decubitus ulcer and recent admission in [place] for septic shock 2/2 sacral wound infection transferred for management of sacral wound.    Patient was recently hospitalized in [facility] after being found unresponsive for septic shock 2/2 sacral wound infection. As per transfer documentation, patient was treated with ceftriaxone/clindamycin/ciprofloxacin, required 4 units pRBCs, had wound debridement x 2, which showed a 20cm x 15 cm sacral bedsore with necrotic coccyx bone. As it was determined that pt will require further surgical debridement and flap reconstruction, patient's [place]. Patient had been in the hospital until yesterday [place]     Patient reports that she feels well and has no complaints. She does, however, endorse sacral pain when wound dressings are changed and when she lies down for an extended period of time. Patient denies fevers/chills, headaches, nausea/vomiting, abdominal pain, URI symptoms and dysuria.     Patient lived in the  for many years and moved to [facility] about 6 years ago after retiring. She has never smoked and does not drink alcohol. Patient's daughter reports that her mother was ambulatory until about November at which time she became increasingly weak so she spent most of her time in bed.     In the ED, vital signs: T:98.6, HR:86, BP:139/62, RR:16 and O2 sat: 100% on RA. Labs were notable for leukocytosis (WBC: 19.41) and hgb of 10.3 and a UA showed large leukocyte esterase and >50 WBCs. Patient received Aztreonam.     Hospital Course:    Patient met sepsis criteria with most likely source being the sacral decubitus ulcer and possible osteomyelitis given necrotic focus on coccyx. Plastics, wound care, and ID consulted. Howell catheter was placed. Plastic surgery deferred on reconstructive surgery. Wound care opted for medical management with debridement and negative pressure wound therapy. ID recommending vancomycin + ertapenem IV. PT recommends rehab given that the patient had leg weakness.  CT lumbosacral spine did not show obvious osteomyelitis but it could not be ruled out. Blood cultures were negative but wound cultures grew ESBL E. Coli, Enterococcus faecalis, Acinetobacter baumannii, and Candida albicans. Patient needed PICC line because of difficult blood draws and she would need antibiotics (now vancomycin, polymixin B, fluconazole) for a total of 6 weeks. Negative pressure wound dressing was placed after debridement. 73 yr female w/ hx of DM, HTN, stage IV sacral decubitus ulcer and recent admission in [place] for septic shock 2/2 sacral wound infection transferred for management of sacral wound.    Patient was recently hospitalized in [facility] after being found unresponsive for septic shock 2/2 sacral wound infection. As per transfer documentation, patient was treated with ceftriaxone/clindamycin/ciprofloxacin, required 4 units pRBCs, had wound debridement x 2, which showed a 20cm x 15 cm sacral bedsore with necrotic coccyx bone. As it was determined that pt will require further surgical debridement and flap reconstruction, patient's family elected [place]. Patient had been in the hospital until yesterday [place]     Patient reports that she feels well and has no complaints. She does, however, endorse sacral pain when wound dressings are changed and when she lies down for an extended period of time. Patient denies fevers/chills, headaches, nausea/vomiting, abdominal pain, URI symptoms and dysuria.     Patient lived in the  for many years and moved to [facility] about 6 years ago after retiring. She has never smoked and does not drink alcohol. Patient's daughter reports that her mother was ambulatory until about November at which time she became increasingly weak so she spent most of her time in bed.     In the ED, vital signs: T:98.6, HR:86, BP:139/62, RR:16 and O2 sat: 100% on RA. Labs were notable for leukocytosis (WBC: 19.41) and hgb of 10.3 and a UA showed large leukocyte esterase and >50 WBCs. Patient received Aztreonam.     Hospital Course:    Patient met sepsis criteria with most likely source being the sacral decubitus ulcer and possible osteomyelitis given necrotic focus on coccyx. Plastics, wound care, and ID consulted. Howell catheter was placed. Plastic surgery deferred on reconstructive surgery. Wound care opted for medical management with debridement and negative pressure wound therapy. ID recommending vancomycin + ertapenem IV. PT recommends rehab given that the patient had leg weakness.  CT lumbosacral spine did not show obvious osteomyelitis but it could not be ruled out. Blood cultures were negative but wound cultures grew ESBL E. Coli, Enterococcus faecalis, Acinetobacter baumannii, and Candida albicans. Patient needed PICC line because of difficult blood draws and she would need antibiotics (now vancomycin, polymixin B, fluconazole) for a total of 6 weeks. Wound vac was placed after debridement. 73 yr female w/ hx of DM, HTN, stage IV sacral decubitus ulcer and recent admission in [place] for septic shock 2/2 sacral wound infection transferred for management of sacral wound.    Patient was recently hospitalized in [facility] after being found unresponsive for septic shock 2/2 sacral wound infection. As per transfer documentation, patient was treated with ceftriaxone/clindamycin/ciprofloxacin, required 4 units pRBCs, had wound debridement x 2, which showed a 20cm x 15 cm sacral bedsore with necrotic coccyx bone. As it was determined that pt will require further surgical debridement and flap reconstruction, patient's family elected [place]. Patient had been in the hospital until yesterday [place]     Patient reports that she feels well and has no complaints. She does, however, endorse sacral pain when wound dressings are changed and when she lies down for an extended period of time. Patient denies fevers/chills, headaches, nausea/vomiting, abdominal pain, URI symptoms and dysuria.     Patient lived in the  for many years and moved to [facility] about 6 years ago after retiring. She has never smoked and does not drink alcohol. Patient's daughter reports that her mother was ambulatory until about November at which time she became increasingly weak so she spent most of her time in bed.     In the ED, vital signs: T:98.6, HR:86, BP:139/62, RR:16 and O2 sat: 100% on RA. Labs were notable for leukocytosis (WBC: 19.41) and hgb of 10.3 and a UA showed large leukocyte esterase and >50 WBCs. Patient received Aztreonam.     Hospital Course:    Patient met sepsis criteria with most likely source being the sacral decubitus ulcer and possible osteomyelitis given necrotic focus on coccyx. Plastics, wound care, and ID consulted. Howell catheter was placed. Plastic surgery deferred on reconstructive surgery. Wound care opted for medical management with debridement and negative pressure wound therapy. ID recommending vancomycin + ertapenem IV. PT recommends rehab given that the patient had leg weakness.  CT lumbosacral spine did not show obvious osteomyelitis but it could not be ruled out. Blood cultures were negative but wound cultures grew ESBL E. Coli, Enterococcus faecalis, Acinetobacter baumannii, and Candida albicans. Patient needed PICC line because of difficult blood draws and she would need antibiotics (now vancomycin, polymixin B, fluconazole) for a total of 6 weeks until 2/18. Wound vac was placed after debridement.   During this hospitalization patient developed an BONG. Patient was started on IVF at 100cc/hour and her creatinine downtrended. Patient was dc'd to [facility]. 73 yr female w/ hx of DM, HTN, stage IV sacral decubitus ulcer and recent admission in [place] for septic shock 2/2 sacral wound infection transferred for management of sacral wound.    Patient was recently hospitalized in [facility] after being found unresponsive for septic shock 2/2 sacral wound infection. As per transfer documentation, patient was treated with ceftriaxone/clindamycin/ciprofloxacin, required 4 units pRBCs, had wound debridement x 2, which showed a 20cm x 15 cm sacral bedsore with necrotic coccyx bone. As it was determined that pt will require further surgical debridement and flap reconstruction, patient's family elected [place]. Patient had been in the hospital until yesterday [place]     Patient reports that she feels well and has no complaints. She does, however, endorse sacral pain when wound dressings are changed and when she lies down for an extended period of time. Patient denies fevers/chills, headaches, nausea/vomiting, abdominal pain, URI symptoms and dysuria.     Patient lived in the  for many years and moved to [facility] about 6 years ago after retiring. She has never smoked and does not drink alcohol. Patient's daughter reports that her mother was ambulatory until about November at which time she became increasingly weak so she spent most of her time in bed.     In the ED, vital signs: T:98.6, HR:86, BP:139/62, RR:16 and O2 sat: 100% on RA. Labs were notable for leukocytosis (WBC: 19.41) and hgb of 10.3 and a UA showed large leukocyte esterase and >50 WBCs. Patient received Aztreonam.     Hospital Course:    Patient met sepsis criteria with most likely source being the sacral decubitus ulcer and possible osteomyelitis given necrotic focus on coccyx. Plastics, wound care, and ID consulted. Howell catheter was placed. Plastic surgery deferred on reconstructive surgery. Wound care opted for medical management with debridement and negative pressure wound therapy. ID recommending vancomycin + ertapenem IV. PT recommends rehab given that the patient had leg weakness.  CT lumbosacral spine did not show obvious osteomyelitis but it could not be ruled out. Blood cultures were negative but wound cultures grew ESBL E. Coli, Enterococcus faecalis, Acinetobacter baumannii, and Candida albicans. Patient needed PICC line because of difficult blood draws and she would need antibiotics (now vancomycin, polymixin B, fluconazole) for a total of 6 weeks until 2/18. Wound vac was placed after debridement.   During this hospitalization patient developed an BONG. Patient was started on IVF at 100cc/hour for 1 day and her antibiotics were changed to tigecycline for concern for drug induced BONG. Patient's creatinine continued to uptrend. Nephrology was consulted.     Patient was dc'd to [facility] rehab. 73 yr female w/ hx of DM, HTN, stage IV sacral decubitus ulcer and recent admission in [place] for septic shock 2/2 sacral wound infection transferred for management of sacral wound.    Patient was recently hospitalized in [facility] after being found unresponsive for septic shock 2/2 sacral wound infection. As per transfer documentation, patient was treated with ceftriaxone/clindamycin/ciprofloxacin, required 4 units pRBCs, had wound debridement x 2, which showed a 20cm x 15 cm sacral bedsore with necrotic coccyx bone. As it was determined that pt will require further surgical debridement and flap reconstruction, patient's family elected [place]. Patient had been in the hospital until yesterday [place]     Patient reports that she feels well and has no complaints. She does, however, endorse sacral pain when wound dressings are changed and when she lies down for an extended period of time. Patient denies fevers/chills, headaches, nausea/vomiting, abdominal pain, URI symptoms and dysuria.     Patient lived in the  for many years and moved to [facility] about 6 years ago after retiring. She has never smoked and does not drink alcohol. Patient's daughter reports that her mother was ambulatory until about November at which time she became increasingly weak so she spent most of her time in bed.     In the ED, vital signs: T:98.6, HR:86, BP:139/62, RR:16 and O2 sat: 100% on RA. Labs were notable for leukocytosis (WBC: 19.41) and hgb of 10.3 and a UA showed large leukocyte esterase and >50 WBCs. Patient received Aztreonam.     Hospital Course:    Patient met sepsis criteria with most likely source being the sacral decubitus ulcer and possible osteomyelitis given necrotic focus on coccyx. Plastics, wound care, and ID consulted. Howell catheter was placed. Plastic surgery deferred on reconstructive surgery. Wound care opted for medical management with debridement and negative pressure wound therapy. ID recommending vancomycin + ertapenem IV. PT recommends rehab given that the patient had leg weakness.  CT lumbosacral spine did not show obvious osteomyelitis but it could not be ruled out. Blood cultures were negative but wound cultures grew ESBL E. Coli, Enterococcus faecalis, Acinetobacter baumannii, and Candida albicans. Patient needed PICC line because of difficult blood draws and she would need antibiotics (now vancomycin, polymixin B, fluconazole) for a total of 6 weeks until 2/18. Wound vac was placed after debridement.   During this hospitalization patient developed an BONG. Patient was started on IVF at 100cc/hour for 1 day and her antibiotics were changed to tigecycline for concern for drug induced BONG. Patient's creatinine continued to uptrend. Nephrology was consulted and they recommended IVF. Urine eosinophils were present. Patient's creatinine slowly downtrended.     During this hospitalization patient had some elevated BP and was started on norvasc 2.5mg.      Patient was dc'd to [facility]. 73 yr female w/ hx of DM, HTN, stage IV sacral decubitus ulcer and recent admission in [place] for septic shock 2/2 sacral wound infection transferred for management of sacral wound.    Patient was recently hospitalized in [place] after being found unresponsive for septic shock 2/2 sacral wound infection. As per transfer documentation, patient was treated with ceftriaxone/clindamycin/ciprofloxacin, required 4 units pRBCs, had wound debridement x 2, which showed a 20cm x 15 cm sacral bedsore with necrotic coccyx bone. As it was determined that pt will require further surgical debridement and flap reconstruction, patient's [place] elected [place]. Patient had been in the hospital until yesterday [place]     Patient reports that she feels well and has no complaints. She does, however, endorse sacral pain when wound dressings are changed and when she lies down for an extended period of time. Patient denies fevers/chills, headaches, nausea/vomiting, abdominal pain, URI symptoms and dysuria.     Patient lived in the  for many years and moved to [facility] about 6 years ago after retiring. She has never smoked and does not drink alcohol. Patient's daughter reports that her mother was ambulatory until about November at which time she became increasingly weak so she spent most of her time in bed.     In the ED, vital signs: T:98.6, HR:86, BP:139/62, RR:16 and O2 sat: 100% on RA. Labs were notable for leukocytosis (WBC: 19.41) and hgb of 10.3 and a UA showed large leukocyte esterase and >50 WBCs. Patient received Aztreonam.     Hospital Course:    Patient met sepsis criteria with most likely source being the sacral decubitus ulcer and possible osteomyelitis given necrotic focus on coccyx. Plastics, wound care, and ID consulted. Howell catheter was placed. Plastic surgery deferred on reconstructive surgery. Wound care (Dr. Riley) opted for medical management with debridement and negative pressure wound therapy. ID recommending vancomycin + ertapenem IV. PT recommends rehab given that the patient had leg weakness.  CT lumbosacral spine did not show obvious osteomyelitis but it could not be ruled out. Blood cultures were negative but wound cultures grew ESBL E. Coli, Enterococcus faecalis, Acinetobacter baumannii, and Candida albicans. Patient needed PICC line because of difficult blood draws and she would need antibiotics (now vancomycin, polymixin B, fluconazole) for a total of 6 weeks until 2/18. Wound vac was placed after debridement.   During this hospitalization patient developed an BONG. Patient was started on IVF at 100cc/hour for 1 day and her antibiotics were changed to tigecycline for concern for drug induced BONG. Patient's creatinine continued to uptrend. Nephrology was consulted and they recommended IVF. Urine eosinophils were present. Renal u/s showed: Increased cortical echogenicity bilaterally may be seen in the setting of medical renal disease. Patient's creatinine slowly downtrended.     Patient's PICC line stopped functioning on 1/30. IR was consulted for PICC replacement.     During this hospitalization patient had some elevated BP and was started on norvasc 2.5mg which was then uptitrated to 5mg QD.      Patient was dc'd to [facility]. 73 yr female w/ hx of DM, HTN, stage IV sacral decubitus ulcer and recent admission in [place] for septic shock 2/2 sacral wound infection transferred for management of sacral wound.    Patient was recently hospitalized in [place] after being found unresponsive for septic shock 2/2 sacral wound infection. As per transfer documentation, patient was treated with ceftriaxone/clindamycin/ciprofloxacin, required 4 units pRBCs, had wound debridement x 2, which showed a 20cm x 15 cm sacral bedsore with necrotic coccyx bone. As it was determined that pt will require further surgical debridement and flap reconstruction, patient's [place] elected [place]. Patient had been in the hospital until yesterday [place]     Patient reports that she feels well and has no complaints. She does, however, endorse sacral pain when wound dressings are changed and when she lies down for an extended period of time. Patient denies fevers/chills, headaches, nausea/vomiting, abdominal pain, URI symptoms and dysuria.     Patient lived in the  for many years and moved to [facility] about 6 years ago after retiring. She has never smoked and does not drink alcohol. Patient's daughter reports that her mother was ambulatory until about November at which time she became increasingly weak so she spent most of her time in bed.     In the ED, vital signs: T:98.6, HR:86, BP:139/62, RR:16 and O2 sat: 100% on RA. Labs were notable for leukocytosis (WBC: 19.41) and hgb of 10.3 and a UA showed large leukocyte esterase and >50 WBCs. Patient received Aztreonam.     Hospital Course:    Patient met sepsis criteria with most likely source being the sacral decubitus ulcer and possible osteomyelitis given necrotic focus on coccyx. Plastics, wound care, and ID consulted. Howell catheter was placed. Plastic surgery deferred on reconstructive surgery. Wound care (Dr. Riley) opted for medical management with debridement and negative pressure wound therapy. ID recommending vancomycin + ertapenem IV. PT recommends rehab given that the patient had leg weakness.  CT lumbosacral spine did not show obvious osteomyelitis but it could not be ruled out. Blood cultures were negative but wound cultures grew ESBL E. Coli, Enterococcus faecalis, Acinetobacter baumannii, and Candida albicans. Patient needed PICC line because of difficult blood draws and she would need antibiotics (now vancomycin, polymixin B, fluconazole) for a total of 6 weeks until 2/27. Wound vac was placed after debridement.   During this hospitalization patient developed an BONG. Patient was started on IVF at 100cc/hour for 1 day and her antibiotics were changed to tigecycline for concern for drug induced BONG. Patient's creatinine continued to uptrend. Nephrology was consulted and they recommended IVF. Urine eosinophils were present. Renal u/s showed: Increased cortical echogenicity bilaterally may be seen in the setting of medical renal disease. Patient's creatinine slowly downtrended.     Patient's PICC line stopped functioning on 1/30. IR was consulted for PICC replacement.     During this hospitalization patient had some elevated BP and was started on norvasc 2.5mg which was then uptitrated to 5mg QD.      Patient was dc'd to rehab on tigecycline and fluconazole until 2/27. 73 yr female w/ hx of DM, HTN, stage IV sacral decubitus ulcer and recent admission in [place] for septic shock 2/2 sacral wound infection transferred for management of sacral wound.    Patient was recently hospitalized in [place] after being found unresponsive for septic shock 2/2 sacral wound infection. As per transfer documentation, patient was treated with ceftriaxone/clindamycin/ciprofloxacin, required 4 units pRBCs, had wound debridement x 2, which showed a 20cm x 15 cm sacral bedsore with necrotic coccyx bone. As it was determined that pt will require further surgical debridement and flap reconstruction, patient's [place] elected [place]. Patient had been in the hospital until yesterday [place]     Patient reports that she feels well and has no complaints. She does, however, endorse sacral pain when wound dressings are changed and when she lies down for an extended period of time. Patient denies fevers/chills, headaches, nausea/vomiting, abdominal pain, URI symptoms and dysuria.     Patient lived in the  for many years and moved to [facility] about 6 years ago after retiring. She has never smoked and does not drink alcohol. Patient's daughter reports that her mother was ambulatory until about November at which time she became increasingly weak so she spent most of her time in bed.     In the ED, vital signs: T:98.6, HR:86, BP:139/62, RR:16 and O2 sat: 100% on RA. Labs were notable for leukocytosis (WBC: 19.41) and hgb of 10.3 and a UA showed large leukocyte esterase and >50 WBCs. Patient received Aztreonam.     Hospital Course:    Patient met sepsis criteria with most likely source being the sacral decubitus ulcer and possible osteomyelitis given necrotic focus on coccyx. Plastics, wound care, and ID consulted. Howell catheter was placed. Plastic surgery deferred on reconstructive surgery. Wound care (Dr. Riley) opted for medical management with debridement and negative pressure wound therapy. ID recommending vancomycin + ertapenem IV. PT recommends rehab given that the patient had leg weakness.  CT lumbosacral spine did not show obvious osteomyelitis but it could not be ruled out. Blood cultures were negative but wound cultures grew ESBL E. Coli, Enterococcus faecalis, Acinetobacter baumannii, and Candida albicans. Patient needed PICC line because of difficult blood draws and she would need antibiotics (now vancomycin, polymixin B, fluconazole) for a total of 6 weeks until 2/27. Wound vac was placed after debridement.   During this hospitalization patient developed an BONG. Patient was started on IVF at 100cc/hour for 1 day and her antibiotics were changed to tigecycline for concern for drug induced BONG. Patient's creatinine continued to uptrend. Nephrology was consulted and they recommended IVF. Urine eosinophils were present. Renal u/s showed: Increased cortical echogenicity bilaterally may be seen in the setting of medical renal disease. Patient's creatinine slowly downtrended. Patient was given instructions to follow up at the [facility] and with Dr. Su (ID) after discharge.     Patient's PICC line stopped functioning on 1/30. IR was consulted for PICC replacement.     During this hospitalization patient had some elevated BP and was started on norvasc 2.5mg which was then uptitrated to 5mg QD.      Patient was dc'd to rehab on tigecycline and fluconazole until 2/27. 73 yr female w/ hx of DM, HTN, stage IV sacral decubitus ulcer and recent admission in [place] for septic shock 2/2 sacral wound infection transferred for management of sacral wound.    Patient was recently hospitalized in [place] after being found unresponsive for septic shock 2/2 sacral wound infection. As per transfer documentation, patient was treated with ceftriaxone/clindamycin/ciprofloxacin, required 4 units pRBCs, had wound debridement x 2, which showed a 20cm x 15 cm sacral bedsore with necrotic coccyx bone. As it was determined that pt will require further surgical debridement and flap reconstruction, patient's family elected to [place]. Patient had been in the hospital until yesterday [place]     Patient reports that she feels well and has no complaints. She does, however, endorse sacral pain when wound dressings are changed and when she lies down for an extended period of time. Patient denies fevers/chills, headaches, nausea/vomiting, abdominal pain, URI symptoms and dysuria.     Patient lived in the  for many years and moved to [facility] about 6 years ago after retiring. She has never smoked and does not drink alcohol. Patient's daughter reports that her mother was ambulatory until about November at which time she became increasingly weak so she spent most of her time in bed.     In the ED, vital signs: T:98.6, HR:86, BP:139/62, RR:16 and O2 sat: 100% on RA. Labs were notable for leukocytosis (WBC: 19.41) and hgb of 10.3 and a UA showed large leukocyte esterase and >50 WBCs. Patient received Aztreonam.     Hospital Course:    Patient met sepsis criteria with most likely source being the sacral decubitus ulcer and possible osteomyelitis given necrotic focus on coccyx. Plastics, wound care, and ID consulted. Howell catheter was placed. Plastic surgery deferred on reconstructive surgery. Wound care (Dr. Riley) opted for medical management with debridement and negative pressure wound therapy. ID recommending vancomycin + ertapenem IV. PT recommends rehab given that the patient had leg weakness.  CT lumbosacral spine did not show obvious osteomyelitis but it could not be ruled out. Blood cultures were negative but wound cultures grew ESBL E. Coli, Enterococcus faecalis, Acinetobacter baumannii, and Candida albicans. Patient needed PICC line because of difficult blood draws and she would need antibiotics (now vancomycin, polymixin B, fluconazole) for a total of 6 weeks until 2/27. Wound vac was placed after debridement.   During this hospitalization patient developed an BONG. Patient was started on IVF at 100cc/hour for 1 day and her antibiotics were changed to tigecycline for concern for drug induced BONG. Patient's creatinine continued to uptrend. Nephrology was consulted and they recommended IVF. Urine eosinophils were present. Renal u/s showed: Increased cortical echogenicity bilaterally may be seen in the setting of medical renal disease. Patient's creatinine slowly downtrended. Patient was given instructions to follow up at [facility], Dr. Riley (wound care), and Dr. Su (ID) after discharge.     Patient's PICC line stopped functioning on 1/30. IR was consulted for PICC replacement.     During this hospitalization patient had some elevated BP and was started on norvasc 2.5mg which was then uptitrated to 5mg QD.      Patient was dc'd to rehab on tigecycline and fluconazole until 2/27.

## 2017-01-22 NOTE — DISCHARGE NOTE ADULT - ADDITIONAL INSTRUCTIONS
Please follow up with nephrology in 1 week by calling [facility] at [phone] Please follow up with nephrology in 1 week by calling [facility] at [phone]  Please follow up with Dr. Riley (wound care physician) at [address] [address], [place], [place] by calling [phone].  Please follow up with Dr. Su (infectious disease) by calling [phone].

## 2017-01-22 NOTE — DISCHARGE NOTE ADULT - PATIENT PORTAL LINK FT
“You can access the [facility] Patient Portal, offered by [facility], by registering with the following website: [email or web address]”

## 2017-01-22 NOTE — DISCHARGE NOTE ADULT - MEDICATION SUMMARY - MEDICATIONS TO STOP TAKING
I will STOP taking the medications listed below when I get home from the hospital:    metFORMIN 500 mg oral tablet  -- 1 tab(s) by mouth 2 times a day    rosuvastatin 10 mg oral tablet  -- 1 tab(s) by mouth once a day (at bedtime) I will STOP taking the medications listed below when I get home from the hospital:    rosuvastatin 10 mg oral tablet  -- 1 tab(s) by mouth once a day (at bedtime)

## 2017-01-22 NOTE — DISCHARGE NOTE ADULT - PLAN OF CARE
Resolution You were diagnosed with sepsis Healing You have a sacral decubitus ulcer which needs negative pressure wound therapy. You are to continue taking your antibiotics for a total of 6 weeks. Normal blood sugar. Please continue to take your diabetes medications as prescribed. Normal blood pressure Please continue to take your blood pressure medications as prescribed. You were diagnosed with sepsis likely secondary to infected sacral decubitus ulcer and possible underlying osteomyelitis. You had the wound debrided and a wound vacuum dressing placed to promote healing. You were also placed on a 6 week antibiotic course and given a PICC line so you had a reliable line through which you could receive antibiotics. You have a sacral decubitus ulcer which was debrided and had a wound vac placed. You are to continue taking your antibiotics (vanco, fluconazole, and polymyxin) for a total of 6 weeks until 2/18. You were diagnosed with sepsis likely secondary to infected sacral decubitus ulcer and possible underlying osteomyelitis. You had the wound debrided and a wound vacuum dressing placed to promote healing. You were also placed on a 6 week antibiotic course (until 2/28) and given a PICC line so you had a reliable line through which you could receive antibiotics. During your hospitalization, your kidney had some injury. This was most likely secondary to the antibiotics. Your antibiotics were changed. Nephrology was on board You are to continue taking your antibiotics for a total of 6 weeks until 2/18. Please continue to take your diabetes medications as prescribed. Please follow up with your PMD in 1 week. You had high blood pressuers while in the hospital and were started on norvasc. Please take norvasc 2.5mg once a day. You were diagnosed with sepsis likely secondary to infected sacral decubitus ulcer and possible underlying osteomyelitis. You had the wound debrided and a wound vacuum dressing placed to promote healing. You were also placed on a 6 week antibiotic course (until 2/27) and given a PICC line so you had a reliable line through which you could receive antibiotics. During your hospitalization, your kidney had some injury. This was most likely secondary to the antibiotics. Your antibiotics were changed. Your were given IV fluids and your creatinine slowly started to come down. You are to continue taking your antibiotics for a total of 6 weeks until 2/27. You had some elevated blood pressures while in the hospital. You were started on norvasc which was uptitrated to 5mg once a day. Please continue to take this medication as prescribed and follow up with your PCP in 1 week. You came in with a history of DM2; however while in the hospital your blood sugars have been appropriate. Please stop taking your home metforming and follow up with your PMD in 1 week. You were diagnosed with sepsis likely secondary to infected sacral decubitus ulcer and possible underlying osteomyelitis. You had the wound debrided and a wound vacuum dressing placed to promote healing. You were also placed on a 6 week antibiotic course (until 2/27) and given a PICC line so you had a reliable line through which you could receive antibiotics. Please also continue fluconazole 200mg every day for the full 6 weeks (until 2/27).  Please follow up with your PMD in 1 week. You came in with a history of DM2; however while in the hospital your blood sugars have been appropriate. Please stop taking your home metformin and follow up with your PMD in 1 week. During your hospitalization, your kidney had some injury. This was most likely secondary to the antibiotics. Your antibiotics were changed. Your were given IV fluids and your creatinine slowly started to come down. Please follow up with nephrology at the [facility] by calling [phone] in 1 week. You are to continue taking your antibiotics for a total of 6 weeks until 2/27. Please follow up with Dr. Su (Infectious disease) by calling [phone] 1 week after you're discharged. You have a sacral decubitus ulcer which was debrided and had a wound vac placed. Please follow up with Dr. Riley at the wound care center at [address] [facility] [address] by calling [phone]. You came in with a history of DM2. Please continue taking your metformin 500mg two times a day. Please follow up with your PMD in 1 week. In rehab facility, please continue 5000U heparin SubQ for DVT prophylaxis given patient's immobility. You are to continue taking your antibiotics for a total of 6 weeks until 2/27. Please follow up with Dr. Su (Infectious disease) by calling [phone] 1 week after you're discharged.    Moisture associated dermatitis under pannus, bilateral groins & bilateral breasts: Place [employer] textile sheeting under pannus, bilateral breasts & groins, leaving 2 inches exposed on ends to wick moisture, remove to wash & dry affected area, then replace. Individual sheeting may be used for up to 5 days unless soiled.     Discussed with patient, nutrition, glycemic control, turning &repositioning q2h with heels off-loaded, topical management and follow up with Plastic surgery and Wound MD, Dr Li for continued wound healing.    Continue low air loss bed therapy, initiate seat cushion when sitting in chair , continue heel elevation with Z-Flex boots while in bed, continue Z-Timothy fluidized positioning device for pressure redistribution and assistance to turn & reposition q2h, continue moisture management with liquid barrier film & single breathable pad, continue measures to decrease friction/shear/pressure.     Right Posterior Thigh and Right Upper Buttocks: clean with SAF-clens. Apply Liquid barrier film to periwound skin. Place (Comfeel) Hydrocolloid as primary dressing. Change every 3 days.    Left Lateral malleolus: Cleanse with SAF-Clens, rinse well with NS. Apply Liquid barrier film to periwound skin. Apply Collagenase nickel thick to base, cover with foam with border. Change daily.     Left & Right Lateral Heels: Clean with NS. Apply Liquid barrier film twice a day and continue to observe for changes in tissue type. You came in with a history of DM2; however, your kidneys are injured and should not be on your home metformin. Please continue insulin sliding scale to cover your blood sugars.

## 2017-01-22 NOTE — DISCHARGE NOTE ADULT - SECONDARY DIAGNOSIS.
Skin ulcer of sacrum with necrosis of muscle Osteomyelitis of vertebra of sacral or sacrococcygeal region Type 2 diabetes mellitus with complication, unspecified long term insulin use status Essential hypertension BONG (acute kidney injury) HTN (hypertension) Need for prophylactic measure

## 2017-01-22 NOTE — DISCHARGE NOTE ADULT - MEDICATION SUMMARY - MEDICATIONS TO TAKE
I will START or STAY ON the medications listed below when I get home from the hospital:    fluconazole 200 mg oral tablet  -- 1 tab(s) by mouth once a day  -- Indication: For Sepsis    atorvastatin 40 mg oral tablet  -- 1 tab(s) by mouth once a day (at bedtime)  -- Indication: For Hyperlipidemia    QUEtiapine 200 mg oral tablet  -- 1 tab(s) by mouth once a day (at bedtime)  -- Indication: For Dementia    amLODIPine 5 mg oral tablet  -- 1 tab(s) by mouth once a day  -- Indication: For Hypertension    collagenase 250 units/g topical ointment  -- 1 application on skin once a day  -- Indication: For Decubitus ulcer of sacral region    tigecycline 50 mg intravenous injection  --  intravenous every 12 hours  -- Indication: For Decubitus ulcer of sacral region    docusate sodium 100 mg oral capsule  -- 1 cap(s) by mouth 3 times a day  -- Indication: For Constipation    senna oral tablet  -- 2 tab(s) by mouth once a day (at bedtime)  -- Indication: For Constipation    Multiple Vitamins oral tablet  -- 1 tab(s) by mouth once a day  -- Indication: For Need for prophylactic measure I will START or STAY ON the medications listed below when I get home from the hospital:    metFORMIN 500 mg oral tablet  -- 1 tab(s) by mouth 2 times a day  -- Indication: For DM (diabetes mellitus)    fluconazole 200 mg oral tablet  -- 1 tab(s) by mouth once a day  -- Indication: For Sepsis    atorvastatin 40 mg oral tablet  -- 1 tab(s) by mouth once a day (at bedtime)  -- Indication: For Hyperlipidemia    QUEtiapine 200 mg oral tablet  -- 1 tab(s) by mouth once a day (at bedtime)  -- Indication: For Dementia    amLODIPine 5 mg oral tablet  -- 1 tab(s) by mouth once a day  -- Indication: For Hypertension    collagenase 250 units/g topical ointment  -- 1 application on skin once a day  -- Indication: For Decubitus ulcer of sacral region    tigecycline 50 mg intravenous injection  --  intravenous every 12 hours  -- Indication: For Decubitus ulcer of sacral region    docusate sodium 100 mg oral capsule  -- 1 cap(s) by mouth 3 times a day  -- Indication: For Constipation    senna oral tablet  -- 2 tab(s) by mouth once a day (at bedtime)  -- Indication: For Constipation    Multiple Vitamins oral tablet  -- 1 tab(s) by mouth once a day  -- Indication: For Need for prophylactic measure I will START or STAY ON the medications listed below when I get home from the hospital:    heparin  --     -- Indication: For Need for prophylactic measure    insulin lispro 100 units/mL subcutaneous solution  --  subcutaneous 3 times a day (before meals); 2 Unit(s) if Glucose 151 - 200  4 Unit(s) if Glucose 201 - 250  6 Unit(s) if Glucose 251 - 300  8 Unit(s) if Glucose 301 - 350  10 Unit(s) if Glucose 351 - 400  12 Unit(s) if Glucose GREATER THAN 400  -- Indication: For DM (diabetes mellitus)    insulin lispro 100 units/mL subcutaneous solution  --  subcutaneous once a day (at bedtime); 2 Unit(s) if Glucose 251 - 300  4 Unit(s) if Glucose 301 - 350  6 Unit(s) if Glucose 351 - 400  8 Unit(s) if Glucose GRAETER THAN 400  -- Indication: For DM (diabetes mellitus)    fluconazole 200 mg oral tablet  -- 1 tab(s) by mouth once a day  -- Indication: For Sepsis    atorvastatin 40 mg oral tablet  -- 1 tab(s) by mouth once a day (at bedtime)  -- Indication: For Hyperlipidemia    QUEtiapine 200 mg oral tablet  -- 1 tab(s) by mouth once a day (at bedtime)  -- Indication: For Dementia    amLODIPine 5 mg oral tablet  -- 1 tab(s) by mouth once a day  -- Indication: For Hypertension    collagenase 250 units/g topical ointment  -- 1 application on skin once a day  -- Indication: For Decubitus ulcer of sacral region    tigecycline 50 mg intravenous injection  --  intravenous every 12 hours  -- Indication: For Decubitus ulcer of sacral region    docusate sodium 100 mg oral capsule  -- 1 cap(s) by mouth 3 times a day  -- Indication: For Constipation    senna oral tablet  -- 2 tab(s) by mouth once a day (at bedtime)  -- Indication: For Constipation    Multiple Vitamins oral tablet  -- 1 tab(s) by mouth once a day  -- Indication: For Need for prophylactic measure I will START or STAY ON the medications listed below when I get home from the hospital:    heparin  -- 5000 unit(s) subcutaneous every 8 hours  -- Indication: For DVT prophylaxis given immobile state    insulin lispro 100 units/mL subcutaneous solution  --  subcutaneous 3 times a day (before meals); 2 Unit(s) if Glucose 151 - 200  4 Unit(s) if Glucose 201 - 250  6 Unit(s) if Glucose 251 - 300  8 Unit(s) if Glucose 301 - 350  10 Unit(s) if Glucose 351 - 400  12 Unit(s) if Glucose GREATER THAN 400  -- Indication: For DM (diabetes mellitus)    insulin lispro 100 units/mL subcutaneous solution  --  subcutaneous once a day (at bedtime); 2 Unit(s) if Glucose 251 - 300  4 Unit(s) if Glucose 301 - 350  6 Unit(s) if Glucose 351 - 400  8 Unit(s) if Glucose GRAETER THAN 400  -- Indication: For DM (diabetes mellitus)    fluconazole 200 mg oral tablet  -- 1 tab(s) by mouth once a day  -- Indication: For Sepsis    atorvastatin 40 mg oral tablet  -- 1 tab(s) by mouth once a day (at bedtime)  -- Indication: For Hyperlipidemia    QUEtiapine 200 mg oral tablet  -- 1 tab(s) by mouth once a day (at bedtime)  -- Indication: For Dementia    amLODIPine 5 mg oral tablet  -- 1 tab(s) by mouth once a day  -- Indication: For Hypertension    collagenase 250 units/g topical ointment  -- 1 application on skin once a day  -- Indication: For Decubitus ulcer of sacral region    tigecycline 50 mg intravenous injection  --  intravenous every 12 hours  -- Indication: For Decubitus ulcer of sacral region    docusate sodium 100 mg oral capsule  -- 1 cap(s) by mouth 3 times a day  -- Indication: For Constipation    senna oral tablet  -- 2 tab(s) by mouth once a day (at bedtime)  -- Indication: For Constipation    Multiple Vitamins oral tablet  -- 1 tab(s) by mouth once a day  -- Indication: For Need for prophylactic measure

## 2017-01-22 NOTE — DISCHARGE NOTE ADULT - CARE PLAN
Principal Discharge DX:	Sepsis, due to unspecified organism  Goal:	Resolution  Instructions for follow-up, activity and diet:	You were diagnosed with sepsis  Secondary Diagnosis:	Skin ulcer of sacrum with necrosis of muscle  Secondary Diagnosis:	Osteomyelitis of vertebra of sacral or sacrococcygeal region  Secondary Diagnosis:	Type 2 diabetes mellitus with complication, unspecified long term insulin use status  Secondary Diagnosis:	Essential hypertension Principal Discharge DX:	Sepsis, due to unspecified organism  Goal:	Resolution  Instructions for follow-up, activity and diet:	You were diagnosed with sepsis likely secondary to infected sacral decubitus ulcer and possible underlying osteomyelitis. You had the wound debrided and a wound vacuum dressing placed to promote healing. You were also placed on a 6 week antibiotic course and given a PICC line so you had a reliable line through which you could receive antibiotics.  Secondary Diagnosis:	Skin ulcer of sacrum with necrosis of muscle  Goal:	Healing  Instructions for follow-up, activity and diet:	You have a sacral decubitus ulcer which needs negative pressure wound therapy.  Secondary Diagnosis:	Osteomyelitis of vertebra of sacral or sacrococcygeal region  Goal:	Resolution  Instructions for follow-up, activity and diet:	You are to continue taking your antibiotics for a total of 6 weeks.  Secondary Diagnosis:	Type 2 diabetes mellitus with complication, unspecified long term insulin use status  Goal:	Normal blood sugar.  Instructions for follow-up, activity and diet:	Please continue to take your diabetes medications as prescribed.  Secondary Diagnosis:	Essential hypertension  Goal:	Normal blood pressure  Instructions for follow-up, activity and diet:	Please continue to take your blood pressure medications as prescribed. Principal Discharge DX:	Sepsis, due to unspecified organism  Goal:	Resolution  Instructions for follow-up, activity and diet:	You were diagnosed with sepsis likely secondary to infected sacral decubitus ulcer and possible underlying osteomyelitis. You had the wound debrided and a wound vacuum dressing placed to promote healing. You were also placed on a 6 week antibiotic course (until 2/28) and given a PICC line so you had a reliable line through which you could receive antibiotics.  Secondary Diagnosis:	Skin ulcer of sacrum with necrosis of muscle  Goal:	Healing  Instructions for follow-up, activity and diet:	You have a sacral decubitus ulcer which was debrided and had a wound vac placed.  Secondary Diagnosis:	Osteomyelitis of vertebra of sacral or sacrococcygeal region  Goal:	Resolution  Instructions for follow-up, activity and diet:	You are to continue taking your antibiotics (vanco, fluconazole, and polymyxin) for a total of 6 weeks until 2/18.  Secondary Diagnosis:	Type 2 diabetes mellitus with complication, unspecified long term insulin use status  Goal:	Normal blood sugar.  Instructions for follow-up, activity and diet:	Please continue to take your diabetes medications as prescribed.  Secondary Diagnosis:	Essential hypertension  Goal:	Normal blood pressure  Instructions for follow-up, activity and diet:	Please continue to take your blood pressure medications as prescribed. Principal Discharge DX:	Sepsis, due to unspecified organism  Goal:	Resolution  Instructions for follow-up, activity and diet:	You were diagnosed with sepsis likely secondary to infected sacral decubitus ulcer and possible underlying osteomyelitis. You had the wound debrided and a wound vacuum dressing placed to promote healing. You were also placed on a 6 week antibiotic course (until 2/28) and given a PICC line so you had a reliable line through which you could receive antibiotics.  Secondary Diagnosis:	Skin ulcer of sacrum with necrosis of muscle  Goal:	Healing  Instructions for follow-up, activity and diet:	You have a sacral decubitus ulcer which was debrided and had a wound vac placed.  Secondary Diagnosis:	Osteomyelitis of vertebra of sacral or sacrococcygeal region  Goal:	Resolution  Instructions for follow-up, activity and diet:	You are to continue taking your antibiotics for a total of 6 weeks until 2/18.  Secondary Diagnosis:	Type 2 diabetes mellitus with complication, unspecified long term insulin use status  Goal:	Normal blood sugar.  Instructions for follow-up, activity and diet:	Please continue to take your diabetes medications as prescribed.  Secondary Diagnosis:	Essential hypertension  Goal:	Normal blood pressure  Instructions for follow-up, activity and diet:	Please continue to take your blood pressure medications as prescribed.  Secondary Diagnosis:	BONG (acute kidney injury)  Instructions for follow-up, activity and diet:	During your hospitalization, your kidney had some injury. This was most likely secondary to the antibiotics. Your antibiotics were changed. Nephrology was on board Principal Discharge DX:	Sepsis, due to unspecified organism  Goal:	Resolution  Instructions for follow-up, activity and diet:	You were diagnosed with sepsis likely secondary to infected sacral decubitus ulcer and possible underlying osteomyelitis. You had the wound debrided and a wound vacuum dressing placed to promote healing. You were also placed on a 6 week antibiotic course (until 2/27) and given a PICC line so you had a reliable line through which you could receive antibiotics.  Secondary Diagnosis:	Skin ulcer of sacrum with necrosis of muscle  Goal:	Healing  Instructions for follow-up, activity and diet:	You have a sacral decubitus ulcer which was debrided and had a wound vac placed.  Secondary Diagnosis:	Osteomyelitis of vertebra of sacral or sacrococcygeal region  Goal:	Resolution  Instructions for follow-up, activity and diet:	You are to continue taking your antibiotics for a total of 6 weeks until 2/27.  Secondary Diagnosis:	Type 2 diabetes mellitus with complication, unspecified long term insulin use status  Goal:	Normal blood sugar.  Instructions for follow-up, activity and diet:	Please continue to take your diabetes medications as prescribed. Please follow up with your PMD in 1 week.  Secondary Diagnosis:	Essential hypertension  Goal:	Normal blood pressure  Instructions for follow-up, activity and diet:	You had high blood pressuers while in the hospital and were started on norvasc. Please take norvasc 2.5mg once a day.  Secondary Diagnosis:	BONG (acute kidney injury)  Instructions for follow-up, activity and diet:	During your hospitalization, your kidney had some injury. This was most likely secondary to the antibiotics. Your antibiotics were changed. Your were given IV fluids and your creatinine slowly started to come down. Principal Discharge DX:	Sepsis, due to unspecified organism  Goal:	Resolution  Instructions for follow-up, activity and diet:	You were diagnosed with sepsis likely secondary to infected sacral decubitus ulcer and possible underlying osteomyelitis. You had the wound debrided and a wound vacuum dressing placed to promote healing. You were also placed on a 6 week antibiotic course (until 2/27) and given a PICC line so you had a reliable line through which you could receive antibiotics.  Secondary Diagnosis:	Skin ulcer of sacrum with necrosis of muscle  Goal:	Healing  Instructions for follow-up, activity and diet:	You have a sacral decubitus ulcer which was debrided and had a wound vac placed.  Secondary Diagnosis:	Osteomyelitis of vertebra of sacral or sacrococcygeal region  Goal:	Resolution  Instructions for follow-up, activity and diet:	You are to continue taking your antibiotics for a total of 6 weeks until 2/27.  Secondary Diagnosis:	Type 2 diabetes mellitus with complication, unspecified long term insulin use status  Goal:	Normal blood sugar.  Instructions for follow-up, activity and diet:	Please continue to take your diabetes medications as prescribed. Please follow up with your PMD in 1 week.  Secondary Diagnosis:	Essential hypertension  Goal:	Normal blood pressure  Instructions for follow-up, activity and diet:	You had high blood pressuers while in the hospital and were started on norvasc. Please take norvasc 2.5mg once a day.  Secondary Diagnosis:	BONG (acute kidney injury)  Instructions for follow-up, activity and diet:	During your hospitalization, your kidney had some injury. This was most likely secondary to the antibiotics. Your antibiotics were changed. Your were given IV fluids and your creatinine slowly started to come down.  Secondary Diagnosis:	HTN (hypertension)  Instructions for follow-up, activity and diet:	You had some elevated blood pressures while in the hospital. You were started on norvasc which was uptitrated to 5mg once a day. Please continue to take this medication as prescribed and follow up with your PCP in 1 week. Principal Discharge DX:	Sepsis, due to unspecified organism  Goal:	Resolution  Instructions for follow-up, activity and diet:	You were diagnosed with sepsis likely secondary to infected sacral decubitus ulcer and possible underlying osteomyelitis. You had the wound debrided and a wound vacuum dressing placed to promote healing. You were also placed on a 6 week antibiotic course (until 2/27) and given a PICC line so you had a reliable line through which you could receive antibiotics.  Secondary Diagnosis:	Skin ulcer of sacrum with necrosis of muscle  Goal:	Healing  Instructions for follow-up, activity and diet:	You have a sacral decubitus ulcer which was debrided and had a wound vac placed.  Secondary Diagnosis:	Osteomyelitis of vertebra of sacral or sacrococcygeal region  Goal:	Resolution  Instructions for follow-up, activity and diet:	You are to continue taking your antibiotics for a total of 6 weeks until 2/27.  Secondary Diagnosis:	Type 2 diabetes mellitus with complication, unspecified long term insulin use status  Goal:	Normal blood sugar.  Instructions for follow-up, activity and diet:	You came in with a history of DM2; however while in the hospital your blood sugars have been appropriate. Please stop taking your home metforming and follow up with your PMD in 1 week.  Secondary Diagnosis:	Essential hypertension  Goal:	Normal blood pressure  Instructions for follow-up, activity and diet:	You had some elevated blood pressures while in the hospital. You were started on norvasc which was uptitrated to 5mg once a day. Please continue to take this medication as prescribed and follow up with your PCP in 1 week.  Secondary Diagnosis:	BONG (acute kidney injury)  Instructions for follow-up, activity and diet:	During your hospitalization, your kidney had some injury. This was most likely secondary to the antibiotics. Your antibiotics were changed. Your were given IV fluids and your creatinine slowly started to come down. Principal Discharge DX:	Sepsis, due to unspecified organism  Goal:	Resolution  Instructions for follow-up, activity and diet:	You were diagnosed with sepsis likely secondary to infected sacral decubitus ulcer and possible underlying osteomyelitis. You had the wound debrided and a wound vacuum dressing placed to promote healing. You were also placed on a 6 week antibiotic course (until 2/27) and given a PICC line so you had a reliable line through which you could receive antibiotics. Please also continue fluconazole 200mg every day for the full 6 weeks (until 2/27).  Please follow up with your PMD in 1 week.  Secondary Diagnosis:	Skin ulcer of sacrum with necrosis of muscle  Goal:	Healing  Instructions for follow-up, activity and diet:	You have a sacral decubitus ulcer which was debrided and had a wound vac placed.  Secondary Diagnosis:	Osteomyelitis of vertebra of sacral or sacrococcygeal region  Goal:	Resolution  Instructions for follow-up, activity and diet:	You are to continue taking your antibiotics for a total of 6 weeks until 2/27.  Secondary Diagnosis:	Type 2 diabetes mellitus with complication, unspecified long term insulin use status  Goal:	Normal blood sugar.  Instructions for follow-up, activity and diet:	You came in with a history of DM2; however while in the hospital your blood sugars have been appropriate. Please stop taking your home metformin and follow up with your PMD in 1 week.  Secondary Diagnosis:	Essential hypertension  Goal:	Normal blood pressure  Instructions for follow-up, activity and diet:	You had some elevated blood pressures while in the hospital. You were started on norvasc which was uptitrated to 5mg once a day. Please continue to take this medication as prescribed and follow up with your PCP in 1 week.  Secondary Diagnosis:	BONG (acute kidney injury)  Instructions for follow-up, activity and diet:	During your hospitalization, your kidney had some injury. This was most likely secondary to the antibiotics. Your antibiotics were changed. Your were given IV fluids and your creatinine slowly started to come down. Principal Discharge DX:	Sepsis, due to unspecified organism  Goal:	Resolution  Instructions for follow-up, activity and diet:	You were diagnosed with sepsis likely secondary to infected sacral decubitus ulcer and possible underlying osteomyelitis. You had the wound debrided and a wound vacuum dressing placed to promote healing. You were also placed on a 6 week antibiotic course (until 2/27) and given a PICC line so you had a reliable line through which you could receive antibiotics. Please also continue fluconazole 200mg every day for the full 6 weeks (until 2/27).  Please follow up with your PMD in 1 week.  Secondary Diagnosis:	Skin ulcer of sacrum with necrosis of muscle  Goal:	Healing  Instructions for follow-up, activity and diet:	You have a sacral decubitus ulcer which was debrided and had a wound vac placed.  Secondary Diagnosis:	Osteomyelitis of vertebra of sacral or sacrococcygeal region  Goal:	Resolution  Instructions for follow-up, activity and diet:	You are to continue taking your antibiotics for a total of 6 weeks until 2/27.  Secondary Diagnosis:	Type 2 diabetes mellitus with complication, unspecified long term insulin use status  Goal:	Normal blood sugar.  Instructions for follow-up, activity and diet:	You came in with a history of DM2; however while in the hospital your blood sugars have been appropriate. Please stop taking your home metformin and follow up with your PMD in 1 week.  Secondary Diagnosis:	Essential hypertension  Goal:	Normal blood pressure  Instructions for follow-up, activity and diet:	You had some elevated blood pressures while in the hospital. You were started on norvasc which was uptitrated to 5mg once a day. Please continue to take this medication as prescribed and follow up with your PCP in 1 week.  Secondary Diagnosis:	BONG (acute kidney injury)  Instructions for follow-up, activity and diet:	During your hospitalization, your kidney had some injury. This was most likely secondary to the antibiotics. Your antibiotics were changed. Your were given IV fluids and your creatinine slowly started to come down. Please follow up with nephrology at the [facility] by calling [phone] in 1 week. Principal Discharge DX:	Sepsis, due to unspecified organism  Goal:	Resolution  Instructions for follow-up, activity and diet:	You were diagnosed with sepsis likely secondary to infected sacral decubitus ulcer and possible underlying osteomyelitis. You had the wound debrided and a wound vacuum dressing placed to promote healing. You were also placed on a 6 week antibiotic course (until 2/27) and given a PICC line so you had a reliable line through which you could receive antibiotics. Please also continue fluconazole 200mg every day for the full 6 weeks (until 2/27).  Please follow up with your PMD in 1 week.  Secondary Diagnosis:	Skin ulcer of sacrum with necrosis of muscle  Goal:	Healing  Instructions for follow-up, activity and diet:	You have a sacral decubitus ulcer which was debrided and had a wound vac placed.  Secondary Diagnosis:	Osteomyelitis of vertebra of sacral or sacrococcygeal region  Goal:	Resolution  Instructions for follow-up, activity and diet:	You are to continue taking your antibiotics for a total of 6 weeks until 2/27. Please follow up with Dr. Su (Infectious disease) by calling [phone] 1 week after you're discharged.  Secondary Diagnosis:	Type 2 diabetes mellitus with complication, unspecified long term insulin use status  Goal:	Normal blood sugar.  Instructions for follow-up, activity and diet:	You came in with a history of DM2; however while in the hospital your blood sugars have been appropriate. Please stop taking your home metformin and follow up with your PMD in 1 week.  Secondary Diagnosis:	Essential hypertension  Goal:	Normal blood pressure  Instructions for follow-up, activity and diet:	You had some elevated blood pressures while in the hospital. You were started on norvasc which was uptitrated to 5mg once a day. Please continue to take this medication as prescribed and follow up with your PCP in 1 week.  Secondary Diagnosis:	BONG (acute kidney injury)  Instructions for follow-up, activity and diet:	During your hospitalization, your kidney had some injury. This was most likely secondary to the antibiotics. Your antibiotics were changed. Your were given IV fluids and your creatinine slowly started to come down. Please follow up with nephrology at the [facility] by calling [phone] in 1 week. Principal Discharge DX:	Sepsis, due to unspecified organism  Goal:	Resolution  Instructions for follow-up, activity and diet:	You were diagnosed with sepsis likely secondary to infected sacral decubitus ulcer and possible underlying osteomyelitis. You had the wound debrided and a wound vacuum dressing placed to promote healing. You were also placed on a 6 week antibiotic course (until 2/27) and given a PICC line so you had a reliable line through which you could receive antibiotics. Please also continue fluconazole 200mg every day for the full 6 weeks (until 2/27).  Please follow up with your PMD in 1 week.  Secondary Diagnosis:	Skin ulcer of sacrum with necrosis of muscle  Goal:	Healing  Instructions for follow-up, activity and diet:	You have a sacral decubitus ulcer which was debrided and had a wound vac placed. Please follow up with Dr. Riley at the wound care center at [address] [place] [address] M6 by calling [phone].  Secondary Diagnosis:	Osteomyelitis of vertebra of sacral or sacrococcygeal region  Goal:	Resolution  Instructions for follow-up, activity and diet:	You are to continue taking your antibiotics for a total of 6 weeks until 2/27. Please follow up with Dr. Su (Infectious disease) by calling [phone] 1 week after you're discharged.  Secondary Diagnosis:	Type 2 diabetes mellitus with complication, unspecified long term insulin use status  Goal:	Normal blood sugar.  Instructions for follow-up, activity and diet:	You came in with a history of DM2; however while in the hospital your blood sugars have been appropriate. Please stop taking your home metformin and follow up with your PMD in 1 week.  Secondary Diagnosis:	Essential hypertension  Goal:	Normal blood pressure  Instructions for follow-up, activity and diet:	You had some elevated blood pressures while in the hospital. You were started on norvasc which was uptitrated to 5mg once a day. Please continue to take this medication as prescribed and follow up with your PCP in 1 week.  Secondary Diagnosis:	BONG (acute kidney injury)  Instructions for follow-up, activity and diet:	During your hospitalization, your kidney had some injury. This was most likely secondary to the antibiotics. Your antibiotics were changed. Your were given IV fluids and your creatinine slowly started to come down. Please follow up with nephrology at the [facility] by calling [phone] in 1 week. Principal Discharge DX:	Sepsis, due to unspecified organism  Goal:	Resolution  Instructions for follow-up, activity and diet:	You were diagnosed with sepsis likely secondary to infected sacral decubitus ulcer and possible underlying osteomyelitis. You had the wound debrided and a wound vacuum dressing placed to promote healing. You were also placed on a 6 week antibiotic course (until 2/27) and given a PICC line so you had a reliable line through which you could receive antibiotics. Please also continue fluconazole 200mg every day for the full 6 weeks (until 2/27).  Please follow up with your PMD in 1 week.  Secondary Diagnosis:	Skin ulcer of sacrum with necrosis of muscle  Goal:	Healing  Instructions for follow-up, activity and diet:	You have a sacral decubitus ulcer which was debrided and had a wound vac placed. Please follow up with Dr. Riley at the wound care center at [address] [place] [address] M6 by calling [phone].  Secondary Diagnosis:	Osteomyelitis of vertebra of sacral or sacrococcygeal region  Goal:	Resolution  Instructions for follow-up, activity and diet:	You are to continue taking your antibiotics for a total of 6 weeks until 2/27. Please follow up with Dr. Su (Infectious disease) by calling [phone] 1 week after you're discharged.  Secondary Diagnosis:	Type 2 diabetes mellitus with complication, unspecified long term insulin use status  Goal:	Normal blood sugar.  Instructions for follow-up, activity and diet:	You came in with a history of DM2. Please continue taking your metformin 500mg two times a day. Please follow up with your PMD in 1 week.  Secondary Diagnosis:	Essential hypertension  Goal:	Normal blood pressure  Instructions for follow-up, activity and diet:	You had some elevated blood pressures while in the hospital. You were started on norvasc which was uptitrated to 5mg once a day. Please continue to take this medication as prescribed and follow up with your PCP in 1 week.  Secondary Diagnosis:	BONG (acute kidney injury)  Instructions for follow-up, activity and diet:	During your hospitalization, your kidney had some injury. This was most likely secondary to the antibiotics. Your antibiotics were changed. Your were given IV fluids and your creatinine slowly started to come down. Please follow up with nephrology at the [facility] by calling [phone] in 1 week. Principal Discharge DX:	Sepsis, due to unspecified organism  Goal:	Resolution  Instructions for follow-up, activity and diet:	You were diagnosed with sepsis likely secondary to infected sacral decubitus ulcer and possible underlying osteomyelitis. You had the wound debrided and a wound vacuum dressing placed to promote healing. You were also placed on a 6 week antibiotic course (until 2/27) and given a PICC line so you had a reliable line through which you could receive antibiotics. Please also continue fluconazole 200mg every day for the full 6 weeks (until 2/27).  Please follow up with your PMD in 1 week.  Secondary Diagnosis:	Skin ulcer of sacrum with necrosis of muscle  Goal:	Healing  Instructions for follow-up, activity and diet:	You have a sacral decubitus ulcer which was debrided and had a wound vac placed. Please follow up with Dr. Riley at the wound care center at [address] [place] [address] M6 by calling [phone].  Secondary Diagnosis:	Osteomyelitis of vertebra of sacral or sacrococcygeal region  Goal:	Resolution  Instructions for follow-up, activity and diet:	You are to continue taking your antibiotics for a total of 6 weeks until 2/27. Please follow up with Dr. Su (Infectious disease) by calling [phone] 1 week after you're discharged.  Secondary Diagnosis:	Type 2 diabetes mellitus with complication, unspecified long term insulin use status  Goal:	Normal blood sugar.  Instructions for follow-up, activity and diet:	You came in with a history of DM2. Please continue taking your metformin 500mg two times a day. Please follow up with your PMD in 1 week.  Secondary Diagnosis:	Essential hypertension  Goal:	Normal blood pressure  Instructions for follow-up, activity and diet:	You had some elevated blood pressures while in the hospital. You were started on norvasc which was uptitrated to 5mg once a day. Please continue to take this medication as prescribed and follow up with your PCP in 1 week.  Secondary Diagnosis:	BONG (acute kidney injury)  Instructions for follow-up, activity and diet:	During your hospitalization, your kidney had some injury. This was most likely secondary to the antibiotics. Your antibiotics were changed. Your were given IV fluids and your creatinine slowly started to come down. Please follow up with nephrology at the [facility] by calling [phone] in 1 week. Principal Discharge DX:	Sepsis, due to unspecified organism  Goal:	Resolution  Instructions for follow-up, activity and diet:	You were diagnosed with sepsis likely secondary to infected sacral decubitus ulcer and possible underlying osteomyelitis. You had the wound debrided and a wound vacuum dressing placed to promote healing. You were also placed on a 6 week antibiotic course (until 2/27) and given a PICC line so you had a reliable line through which you could receive antibiotics. Please also continue fluconazole 200mg every day for the full 6 weeks (until 2/27).  Please follow up with your PMD in 1 week.  Secondary Diagnosis:	Skin ulcer of sacrum with necrosis of muscle  Goal:	Healing  Instructions for follow-up, activity and diet:	You have a sacral decubitus ulcer which was debrided and had a wound vac placed. Please follow up with Dr. Riley at the wound care center at [address] [place] [address] M6 by calling [phone].  Secondary Diagnosis:	Osteomyelitis of vertebra of sacral or sacrococcygeal region  Goal:	Resolution  Instructions for follow-up, activity and diet:	You are to continue taking your antibiotics for a total of 6 weeks until 2/27. Please follow up with Dr. Su (Infectious disease) by calling [phone] 1 week after you're discharged.  Secondary Diagnosis:	Type 2 diabetes mellitus with complication, unspecified long term insulin use status  Goal:	Normal blood sugar.  Instructions for follow-up, activity and diet:	You came in with a history of DM2. Please continue taking your metformin 500mg two times a day. Please follow up with your PMD in 1 week.  Secondary Diagnosis:	Essential hypertension  Goal:	Normal blood pressure  Instructions for follow-up, activity and diet:	You had some elevated blood pressures while in the hospital. You were started on norvasc which was uptitrated to 5mg once a day. Please continue to take this medication as prescribed and follow up with your PCP in 1 week.  Secondary Diagnosis:	BONG (acute kidney injury)  Instructions for follow-up, activity and diet:	During your hospitalization, your kidney had some injury. This was most likely secondary to the antibiotics. Your antibiotics were changed. Your were given IV fluids and your creatinine slowly started to come down. Please follow up with nephrology at the [facility] by calling [phone] in 1 week. Principal Discharge DX:	Sepsis, due to unspecified organism  Goal:	Resolution  Instructions for follow-up, activity and diet:	You were diagnosed with sepsis likely secondary to infected sacral decubitus ulcer and possible underlying osteomyelitis. You had the wound debrided and a wound vacuum dressing placed to promote healing. You were also placed on a 6 week antibiotic course (until 2/27) and given a PICC line so you had a reliable line through which you could receive antibiotics. Please also continue fluconazole 200mg every day for the full 6 weeks (until 2/27).  Please follow up with your PMD in 1 week.  Secondary Diagnosis:	Skin ulcer of sacrum with necrosis of muscle  Goal:	Healing  Instructions for follow-up, activity and diet:	You have a sacral decubitus ulcer which was debrided and had a wound vac placed. Please follow up with Dr. Riley at the wound care center at [address] [place] [address] [address] by calling [phone].  Secondary Diagnosis:	Osteomyelitis of vertebra of sacral or sacrococcygeal region  Goal:	Resolution  Instructions for follow-up, activity and diet:	You are to continue taking your antibiotics for a total of 6 weeks until 2/27. Please follow up with Dr. Su (Infectious disease) by calling [phone] 1 week after you're discharged.    Moisture associated dermatitis under pannus, bilateral groins & bilateral breasts: Place Interdry [employer] textile sheeting under pannus, bilateral breasts & groins, leaving 2 inches exposed on ends to wick moisture, remove to wash & dry affected area, then replace. Individual sheeting may be used for up to 5 days unless soiled.     Discussed with patient, nutrition, glycemic control, turning &repositioning q2h with heels off-loaded, topical management and follow up with Plastic surgery and Wound MD, Dr Li for continued wound healing.    Continue low air loss bed therapy, initiate seat cushion when sitting in chair , continue heel elevation with Z-Flex boots while in bed, continue Z-Timothy fluidized positioning device for pressure redistribution and assistance to turn & reposition q2h, continue moisture management with liquid barrier film & single breathable pad, continue measures to decrease friction/shear/pressure.     Right Posterior Thigh and Right Upper Buttocks: clean with SAF-clens. Apply Liquid barrier film to periwound skin. Place (Comfeel) Hydrocolloid as primary dressing. Change every 3 days.    Left Lateral malleolus: Cleanse with SAF-Clens, rinse well with NS. Apply Liquid barrier film to periwound skin. Apply Collagenase nickel thick to base, cover with foam with border. Change daily.     Left & Right Lateral Heels: Clean with NS. Apply Liquid barrier film twice a day and continue to observe for changes in tissue type.  Secondary Diagnosis:	Type 2 diabetes mellitus with complication, unspecified long term insulin use status  Goal:	Normal blood sugar.  Instructions for follow-up, activity and diet:	You came in with a history of DM2; however, your kidneys are injured and should not be on your home metformin. Please continue insulin sliding scale to cover your blood sugars.  Secondary Diagnosis:	Essential hypertension  Goal:	Normal blood pressure  Instructions for follow-up, activity and diet:	You had some elevated blood pressures while in the hospital. You were started on norvasc which was uptitrated to 5mg once a day. Please continue to take this medication as prescribed and follow up with your PCP in 1 week.  Secondary Diagnosis:	BONG (acute kidney injury)  Instructions for follow-up, activity and diet:	During your hospitalization, your kidney had some injury. This was most likely secondary to the antibiotics. Your antibiotics were changed. Your were given IV fluids and your creatinine slowly started to come down. Please follow up with nephrology at the [facility] by calling [phone] in 1 week.  Secondary Diagnosis:	Need for prophylactic measure  Instructions for follow-up, activity and diet:	In rehab facility, please continue 5000U heparin SubQ for DVT prophylaxis given patient's immobility. Principal Discharge DX:	Sepsis, due to unspecified organism  Goal:	Resolution  Instructions for follow-up, activity and diet:	You were diagnosed with sepsis likely secondary to infected sacral decubitus ulcer and possible underlying osteomyelitis. You had the wound debrided and a wound vacuum dressing placed to promote healing. You were also placed on a 6 week antibiotic course (until 2/27) and given a PICC line so you had a reliable line through which you could receive antibiotics. Please also continue fluconazole 200mg every day for the full 6 weeks (until 2/27).  Please follow up with your PMD in 1 week.  Secondary Diagnosis:	Skin ulcer of sacrum with necrosis of muscle  Goal:	Healing  Instructions for follow-up, activity and diet:	You have a sacral decubitus ulcer which was debrided and had a wound vac placed. Please follow up with Dr. Riley at the wound care center at [address] [place] [address] [address] by calling [phone].  Secondary Diagnosis:	Osteomyelitis of vertebra of sacral or sacrococcygeal region  Goal:	Resolution  Instructions for follow-up, activity and diet:	You are to continue taking your antibiotics for a total of 6 weeks until 2/27. Please follow up with Dr. Su (Infectious disease) by calling [phone] 1 week after you're discharged.    Moisture associated dermatitis under pannus, bilateral groins & bilateral breasts: Place Interdry AG textile sheeting under pannus, bilateral breasts & groins, leaving 2 inches exposed on ends to wick moisture, remove to wash & dry affected area, then replace. Individual sheeting may be used for up to 5 days unless soiled.     Discussed with patient, nutrition, glycemic control, turning &repositioning q2h with heels off-loaded, topical management and follow up with Plastic surgery and Wound MD, Dr Li for continued wound healing.    Continue low air loss bed therapy, initiate seat cushion when sitting in chair , continue heel elevation with Z-Flex boots while in bed, continue Z-Timothy fluidized positioning device for pressure redistribution and assistance to turn & reposition q2h, continue moisture management with liquid barrier film & single breathable pad, continue measures to decrease friction/shear/pressure.     Right Posterior Thigh and Right Upper Buttocks: clean with SAF-clens. Apply Liquid barrier film to periwound skin. Place (Comfeel) Hydrocolloid as primary dressing. Change every 3 days.    Left Lateral malleolus: Cleanse with SAF-Clens, rinse well with NS. Apply Liquid barrier film to periwound skin. Apply Collagenase nickel thick to base, cover with foam with border. Change daily.     Left & Right Lateral Heels: Clean with NS. Apply Liquid barrier film twice a day and continue to observe for changes in tissue type.  Secondary Diagnosis:	Type 2 diabetes mellitus with complication, unspecified long term insulin use status  Goal:	Normal blood sugar.  Instructions for follow-up, activity and diet:	You came in with a history of DM2; however, your kidneys are injured and should not be on your home metformin. Please continue insulin sliding scale to cover your blood sugars.  Secondary Diagnosis:	Essential hypertension  Goal:	Normal blood pressure  Instructions for follow-up, activity and diet:	You had some elevated blood pressures while in the hospital. You were started on norvasc which was uptitrated to 5mg once a day. Please continue to take this medication as prescribed and follow up with your PCP in 1 week.  Secondary Diagnosis:	BONG (acute kidney injury)  Instructions for follow-up, activity and diet:	During your hospitalization, your kidney had some injury. This was most likely secondary to the antibiotics. Your antibiotics were changed. Your were given IV fluids and your creatinine slowly started to come down. Please follow up with nephrology at the [facility] by calling [phone] in 1 week.  Secondary Diagnosis:	Need for prophylactic measure  Instructions for follow-up, activity and diet:	In rehab facility, please continue 5000U heparin SubQ for DVT prophylaxis given patient's immobility.

## 2017-01-23 LAB
APPEARANCE UR: SIGNIFICANT CHANGE UP
BASOPHILS # BLD AUTO: 0.02 K/UL — SIGNIFICANT CHANGE UP (ref 0–0.2)
BASOPHILS NFR BLD AUTO: 0.2 % — SIGNIFICANT CHANGE UP (ref 0–2)
BILIRUB UR-MCNC: NEGATIVE — SIGNIFICANT CHANGE UP
BLOOD UR QL VISUAL: NEGATIVE — SIGNIFICANT CHANGE UP
BUN SERPL-MCNC: 12 MG/DL — SIGNIFICANT CHANGE UP (ref 7–23)
CALCIUM SERPL-MCNC: 7.6 MG/DL — LOW (ref 8.4–10.5)
CHLORIDE SERPL-SCNC: 108 MMOL/L — HIGH (ref 98–107)
CO2 SERPL-SCNC: 20 MMOL/L — LOW (ref 22–31)
COLOR SPEC: YELLOW — SIGNIFICANT CHANGE UP
CREAT SERPL-MCNC: 0.69 MG/DL — SIGNIFICANT CHANGE UP (ref 0.5–1.3)
EOSINOPHIL # BLD AUTO: 0.35 K/UL — SIGNIFICANT CHANGE UP (ref 0–0.5)
EOSINOPHIL NFR BLD AUTO: 2.9 % — SIGNIFICANT CHANGE UP (ref 0–6)
GLUCOSE SERPL-MCNC: 128 MG/DL — HIGH (ref 70–99)
GLUCOSE UR-MCNC: NEGATIVE — SIGNIFICANT CHANGE UP
HCT VFR BLD CALC: 25.1 % — LOW (ref 34.5–45)
HCT VFR BLD CALC: 25.1 % — LOW (ref 34.5–45)
HGB BLD-MCNC: 8.6 G/DL — LOW (ref 11.5–15.5)
HGB BLD-MCNC: 8.6 G/DL — LOW (ref 11.5–15.5)
IMM GRANULOCYTES NFR BLD AUTO: 0.5 % — SIGNIFICANT CHANGE UP (ref 0–1.5)
KETONES UR-MCNC: NEGATIVE — SIGNIFICANT CHANGE UP
LEUKOCYTE ESTERASE UR-ACNC: SIGNIFICANT CHANGE UP
LYMPHOCYTES # BLD AUTO: 3.89 K/UL — HIGH (ref 1–3.3)
LYMPHOCYTES # BLD AUTO: 32.1 % — SIGNIFICANT CHANGE UP (ref 13–44)
MAGNESIUM SERPL-MCNC: 1.6 MG/DL — SIGNIFICANT CHANGE UP (ref 1.6–2.6)
MCHC RBC-ENTMCNC: 28.6 PG — SIGNIFICANT CHANGE UP (ref 27–34)
MCHC RBC-ENTMCNC: 28.6 PG — SIGNIFICANT CHANGE UP (ref 27–34)
MCHC RBC-ENTMCNC: 34.3 % — SIGNIFICANT CHANGE UP (ref 32–36)
MCHC RBC-ENTMCNC: 34.3 % — SIGNIFICANT CHANGE UP (ref 32–36)
MCV RBC AUTO: 83.4 FL — SIGNIFICANT CHANGE UP (ref 80–100)
MCV RBC AUTO: 83.4 FL — SIGNIFICANT CHANGE UP (ref 80–100)
MONOCYTES # BLD AUTO: 0.86 K/UL — SIGNIFICANT CHANGE UP (ref 0–0.9)
MONOCYTES NFR BLD AUTO: 7.1 % — SIGNIFICANT CHANGE UP (ref 2–14)
MUCOUS THREADS # UR AUTO: SIGNIFICANT CHANGE UP
NEUTROPHILS # BLD AUTO: 6.94 K/UL — SIGNIFICANT CHANGE UP (ref 1.8–7.4)
NEUTROPHILS NFR BLD AUTO: 57.2 % — SIGNIFICANT CHANGE UP (ref 43–77)
NITRITE UR-MCNC: NEGATIVE — SIGNIFICANT CHANGE UP
NON-SQ EPI CELLS # UR AUTO: 1 — SIGNIFICANT CHANGE UP
PH UR: 7 — SIGNIFICANT CHANGE UP (ref 4.6–8)
PHOSPHATE SERPL-MCNC: 3.5 MG/DL — SIGNIFICANT CHANGE UP (ref 2.5–4.5)
PLATELET # BLD AUTO: 344 K/UL — SIGNIFICANT CHANGE UP (ref 150–400)
PLATELET # BLD AUTO: 344 K/UL — SIGNIFICANT CHANGE UP (ref 150–400)
PMV BLD: 9.4 FL — SIGNIFICANT CHANGE UP (ref 7–13)
PMV BLD: 9.4 FL — SIGNIFICANT CHANGE UP (ref 7–13)
POTASSIUM SERPL-MCNC: 4 MMOL/L — SIGNIFICANT CHANGE UP (ref 3.5–5.3)
POTASSIUM SERPL-SCNC: 4 MMOL/L — SIGNIFICANT CHANGE UP (ref 3.5–5.3)
PROT UR-MCNC: 30 — HIGH
RBC # BLD: 3.01 M/UL — LOW (ref 3.8–5.2)
RBC # BLD: 3.01 M/UL — LOW (ref 3.8–5.2)
RBC # FLD: 16.8 % — HIGH (ref 10.3–14.5)
RBC # FLD: 16.8 % — HIGH (ref 10.3–14.5)
RBC CASTS # UR COMP ASSIST: SIGNIFICANT CHANGE UP (ref 0–?)
SODIUM SERPL-SCNC: 139 MMOL/L — SIGNIFICANT CHANGE UP (ref 135–145)
SP GR SPEC: 1.01 — SIGNIFICANT CHANGE UP (ref 1–1.03)
SQUAMOUS # UR AUTO: SIGNIFICANT CHANGE UP
UROBILINOGEN FLD QL: NORMAL E.U. — SIGNIFICANT CHANGE UP (ref 0.1–0.2)
VANCOMYCIN TROUGH SERPL-MCNC: 15.6 UG/ML — SIGNIFICANT CHANGE UP (ref 10–20)
VANCOMYCIN TROUGH SERPL-MCNC: 21.1 UG/ML — HIGH (ref 10–20)
VANCOMYCIN TROUGH SERPL-MCNC: 22.3 UG/ML — HIGH (ref 10–20)
WBC # BLD: 12.12 K/UL — HIGH (ref 3.8–10.5)
WBC # BLD: 12.12 K/UL — HIGH (ref 3.8–10.5)
WBC # FLD AUTO: 12.12 K/UL — HIGH (ref 3.8–10.5)
WBC # FLD AUTO: 12.12 K/UL — HIGH (ref 3.8–10.5)
WBC UR QL: SIGNIFICANT CHANGE UP (ref 0–?)

## 2017-01-23 PROCEDURE — 99233 SBSQ HOSP IP/OBS HIGH 50: CPT | Mod: GC

## 2017-01-23 RX ORDER — ACETAMINOPHEN 500 MG
650 TABLET ORAL EVERY 6 HOURS
Qty: 0 | Refills: 0 | Status: DISCONTINUED | OUTPATIENT
Start: 2017-01-23 | End: 2017-02-04

## 2017-01-23 RX ORDER — VANCOMYCIN HCL 1 G
1000 VIAL (EA) INTRAVENOUS EVERY 24 HOURS
Qty: 0 | Refills: 0 | Status: DISCONTINUED | OUTPATIENT
Start: 2017-01-23 | End: 2017-01-24

## 2017-01-23 RX ORDER — IBUPROFEN 200 MG
400 TABLET ORAL ONCE
Qty: 0 | Refills: 0 | Status: COMPLETED | OUTPATIENT
Start: 2017-01-23 | End: 2017-01-23

## 2017-01-23 RX ORDER — ACETAMINOPHEN 500 MG
650 TABLET ORAL EVERY 6 HOURS
Qty: 0 | Refills: 0 | Status: DISCONTINUED | OUTPATIENT
Start: 2017-01-23 | End: 2017-01-23

## 2017-01-23 RX ORDER — VANCOMYCIN HCL 1 G
500 VIAL (EA) INTRAVENOUS EVERY 12 HOURS
Qty: 0 | Refills: 0 | Status: DISCONTINUED | OUTPATIENT
Start: 2017-01-23 | End: 2017-01-23

## 2017-01-23 RX ADMIN — Medication 250 MILLIGRAM(S): at 18:45

## 2017-01-23 RX ADMIN — Medication 2: at 09:09

## 2017-01-23 RX ADMIN — POLYMYXIN B SULFATE 500 UNIT(S): 500000 INJECTION, POWDER, LYOPHILIZED, FOR SOLUTION INTRAMUSCULAR; INTRATHECAL; INTRAVENOUS; OPHTHALMIC at 07:03

## 2017-01-23 RX ADMIN — HEPARIN SODIUM 5000 UNIT(S): 5000 INJECTION INTRAVENOUS; SUBCUTANEOUS at 07:03

## 2017-01-23 RX ADMIN — FLUCONAZOLE 200 MILLIGRAM(S): 150 TABLET ORAL at 12:28

## 2017-01-23 RX ADMIN — POLYMYXIN B SULFATE 500 UNIT(S): 500000 INJECTION, POWDER, LYOPHILIZED, FOR SOLUTION INTRAMUSCULAR; INTRATHECAL; INTRAVENOUS; OPHTHALMIC at 17:28

## 2017-01-23 RX ADMIN — Medication 1 TABLET(S): at 12:28

## 2017-01-23 RX ADMIN — HEPARIN SODIUM 5000 UNIT(S): 5000 INJECTION INTRAVENOUS; SUBCUTANEOUS at 14:09

## 2017-01-23 RX ADMIN — Medication 400 MILLIGRAM(S): at 17:28

## 2017-01-23 RX ADMIN — Medication 2: at 17:28

## 2017-01-23 RX ADMIN — HEPARIN SODIUM 5000 UNIT(S): 5000 INJECTION INTRAVENOUS; SUBCUTANEOUS at 22:59

## 2017-01-23 RX ADMIN — Medication 2: at 12:27

## 2017-01-23 RX ADMIN — Medication 1 APPLICATION(S): at 14:10

## 2017-01-24 LAB
APPEARANCE UR: CLEAR — SIGNIFICANT CHANGE UP
APPEARANCE UR: CLEAR — SIGNIFICANT CHANGE UP
BACTERIA # UR AUTO: SIGNIFICANT CHANGE UP
BILIRUB UR-MCNC: NEGATIVE — SIGNIFICANT CHANGE UP
BILIRUB UR-MCNC: NEGATIVE — SIGNIFICANT CHANGE UP
BLOOD UR QL VISUAL: NEGATIVE — SIGNIFICANT CHANGE UP
BLOOD UR QL VISUAL: NEGATIVE — SIGNIFICANT CHANGE UP
BUN SERPL-MCNC: 17 MG/DL — SIGNIFICANT CHANGE UP (ref 7–23)
BUN SERPL-MCNC: 23 MG/DL — SIGNIFICANT CHANGE UP (ref 7–23)
CALCIUM SERPL-MCNC: 8.5 MG/DL — SIGNIFICANT CHANGE UP (ref 8.4–10.5)
CALCIUM SERPL-MCNC: 8.9 MG/DL — SIGNIFICANT CHANGE UP (ref 8.4–10.5)
CHLORIDE SERPL-SCNC: 94 MMOL/L — LOW (ref 98–107)
CHLORIDE SERPL-SCNC: 96 MMOL/L — LOW (ref 98–107)
CO2 SERPL-SCNC: 22 MMOL/L — SIGNIFICANT CHANGE UP (ref 22–31)
CO2 SERPL-SCNC: 23 MMOL/L — SIGNIFICANT CHANGE UP (ref 22–31)
COLOR SPEC: SIGNIFICANT CHANGE UP
COLOR SPEC: SIGNIFICANT CHANGE UP
CREAT ?TM UR-MCNC: 9.44 MG/DL — SIGNIFICANT CHANGE UP
CREAT SERPL-MCNC: 1.36 MG/DL — HIGH (ref 0.5–1.3)
CREAT SERPL-MCNC: 1.55 MG/DL — HIGH (ref 0.5–1.3)
GLUCOSE SERPL-MCNC: 129 MG/DL — HIGH (ref 70–99)
GLUCOSE SERPL-MCNC: 277 MG/DL — HIGH (ref 70–99)
GLUCOSE UR-MCNC: 300 — SIGNIFICANT CHANGE UP
GLUCOSE UR-MCNC: NEGATIVE — SIGNIFICANT CHANGE UP
HCT VFR BLD CALC: 23.9 % — LOW (ref 34.5–45)
HGB BLD-MCNC: 8 G/DL — LOW (ref 11.5–15.5)
HYALINE CASTS # UR AUTO: SIGNIFICANT CHANGE UP (ref 0–?)
KETONES UR-MCNC: NEGATIVE — SIGNIFICANT CHANGE UP
KETONES UR-MCNC: NEGATIVE — SIGNIFICANT CHANGE UP
LEUKOCYTE ESTERASE UR-ACNC: HIGH
LEUKOCYTE ESTERASE UR-ACNC: HIGH
MCHC RBC-ENTMCNC: 28 PG — SIGNIFICANT CHANGE UP (ref 27–34)
MCHC RBC-ENTMCNC: 33.5 % — SIGNIFICANT CHANGE UP (ref 32–36)
MCV RBC AUTO: 83.6 FL — SIGNIFICANT CHANGE UP (ref 80–100)
NITRITE UR-MCNC: NEGATIVE — SIGNIFICANT CHANGE UP
NITRITE UR-MCNC: NEGATIVE — SIGNIFICANT CHANGE UP
NON-SQ EPI CELLS # UR AUTO: 2 — SIGNIFICANT CHANGE UP
NON-SQ EPI CELLS # UR AUTO: <1 — SIGNIFICANT CHANGE UP
PH UR: 7 — SIGNIFICANT CHANGE UP (ref 4.6–8)
PH UR: 7.5 — SIGNIFICANT CHANGE UP (ref 4.6–8)
PLATELET # BLD AUTO: 586 K/UL — HIGH (ref 150–400)
PMV BLD: 9 FL — SIGNIFICANT CHANGE UP (ref 7–13)
POTASSIUM SERPL-MCNC: 4.7 MMOL/L — SIGNIFICANT CHANGE UP (ref 3.5–5.3)
POTASSIUM SERPL-MCNC: 4.8 MMOL/L — SIGNIFICANT CHANGE UP (ref 3.5–5.3)
POTASSIUM SERPL-SCNC: 4.7 MMOL/L — SIGNIFICANT CHANGE UP (ref 3.5–5.3)
POTASSIUM SERPL-SCNC: 4.8 MMOL/L — SIGNIFICANT CHANGE UP (ref 3.5–5.3)
PROT UR-MCNC: 10 — SIGNIFICANT CHANGE UP
PROT UR-MCNC: 30 — HIGH
RBC # BLD: 2.86 M/UL — LOW (ref 3.8–5.2)
RBC # FLD: 16.7 % — HIGH (ref 10.3–14.5)
RBC CASTS # UR COMP ASSIST: SIGNIFICANT CHANGE UP (ref 0–?)
SODIUM SERPL-SCNC: 130 MMOL/L — LOW (ref 135–145)
SODIUM SERPL-SCNC: 133 MMOL/L — LOW (ref 135–145)
SODIUM UR-SCNC: 21 MEQ/L — SIGNIFICANT CHANGE UP
SP GR SPEC: 1 — LOW (ref 1–1.03)
SP GR SPEC: 1.01 — SIGNIFICANT CHANGE UP (ref 1–1.03)
SPECIMEN SOURCE: SIGNIFICANT CHANGE UP
SQUAMOUS # UR AUTO: SIGNIFICANT CHANGE UP
UROBILINOGEN FLD QL: NORMAL E.U. — SIGNIFICANT CHANGE UP (ref 0.1–0.2)
UROBILINOGEN FLD QL: NORMAL E.U. — SIGNIFICANT CHANGE UP (ref 0.1–0.2)
UUN UR-MCNC: 67.3 MG/DL — SIGNIFICANT CHANGE UP
VANCOMYCIN FLD-MCNC: 33.3 UG/ML — CRITICAL HIGH
WBC # BLD: 9.64 K/UL — SIGNIFICANT CHANGE UP (ref 3.8–10.5)
WBC # FLD AUTO: 9.64 K/UL — SIGNIFICANT CHANGE UP (ref 3.8–10.5)
WBC UR QL: HIGH (ref 0–?)
WBC UR QL: SIGNIFICANT CHANGE UP (ref 0–?)

## 2017-01-24 PROCEDURE — 99232 SBSQ HOSP IP/OBS MODERATE 35: CPT

## 2017-01-24 PROCEDURE — 99233 SBSQ HOSP IP/OBS HIGH 50: CPT | Mod: GC

## 2017-01-24 RX ORDER — TIGECYCLINE 50 MG/5ML
100 INJECTION, POWDER, LYOPHILIZED, FOR SOLUTION INTRAVENOUS ONCE
Qty: 0 | Refills: 0 | Status: COMPLETED | OUTPATIENT
Start: 2017-01-24 | End: 2017-01-24

## 2017-01-24 RX ORDER — SODIUM CHLORIDE 9 MG/ML
1000 INJECTION, SOLUTION INTRAVENOUS
Qty: 0 | Refills: 0 | Status: DISCONTINUED | OUTPATIENT
Start: 2017-01-24 | End: 2017-01-29

## 2017-01-24 RX ORDER — VANCOMYCIN HCL 1 G
VIAL (EA) INTRAVENOUS
Qty: 0 | Refills: 0 | Status: DISCONTINUED | OUTPATIENT
Start: 2017-01-24 | End: 2017-01-24

## 2017-01-24 RX ORDER — TIGECYCLINE 50 MG/5ML
50 INJECTION, POWDER, LYOPHILIZED, FOR SOLUTION INTRAVENOUS EVERY 12 HOURS
Qty: 0 | Refills: 0 | Status: DISCONTINUED | OUTPATIENT
Start: 2017-01-25 | End: 2017-02-04

## 2017-01-24 RX ORDER — TIGECYCLINE 50 MG/5ML
INJECTION, POWDER, LYOPHILIZED, FOR SOLUTION INTRAVENOUS
Qty: 0 | Refills: 0 | Status: DISCONTINUED | OUTPATIENT
Start: 2017-01-24 | End: 2017-02-04

## 2017-01-24 RX ORDER — VANCOMYCIN HCL 1 G
1000 VIAL (EA) INTRAVENOUS EVERY 24 HOURS
Qty: 0 | Refills: 0 | Status: DISCONTINUED | OUTPATIENT
Start: 2017-01-24 | End: 2017-01-24

## 2017-01-24 RX ORDER — VANCOMYCIN HCL 1 G
1000 VIAL (EA) INTRAVENOUS ONCE
Qty: 0 | Refills: 0 | Status: DISCONTINUED | OUTPATIENT
Start: 2017-01-24 | End: 2017-01-24

## 2017-01-24 RX ORDER — VANCOMYCIN HCL 1 G
500 VIAL (EA) INTRAVENOUS EVERY 12 HOURS
Qty: 0 | Refills: 0 | Status: DISCONTINUED | OUTPATIENT
Start: 2017-01-24 | End: 2017-01-24

## 2017-01-24 RX ADMIN — HEPARIN SODIUM 5000 UNIT(S): 5000 INJECTION INTRAVENOUS; SUBCUTANEOUS at 13:20

## 2017-01-24 RX ADMIN — TIGECYCLINE 105 MILLIGRAM(S): 50 INJECTION, POWDER, LYOPHILIZED, FOR SOLUTION INTRAVENOUS at 17:40

## 2017-01-24 RX ADMIN — SODIUM CHLORIDE 100 MILLILITER(S): 9 INJECTION, SOLUTION INTRAVENOUS at 11:26

## 2017-01-24 RX ADMIN — FLUCONAZOLE 200 MILLIGRAM(S): 150 TABLET ORAL at 12:47

## 2017-01-24 RX ADMIN — POLYMYXIN B SULFATE 500 UNIT(S): 500000 INJECTION, POWDER, LYOPHILIZED, FOR SOLUTION INTRAMUSCULAR; INTRATHECAL; INTRAVENOUS; OPHTHALMIC at 05:19

## 2017-01-24 RX ADMIN — Medication 1 APPLICATION(S): at 08:06

## 2017-01-24 RX ADMIN — Medication 2: at 23:10

## 2017-01-24 RX ADMIN — SODIUM CHLORIDE 100 MILLILITER(S): 9 INJECTION, SOLUTION INTRAVENOUS at 21:13

## 2017-01-24 RX ADMIN — HEPARIN SODIUM 5000 UNIT(S): 5000 INJECTION INTRAVENOUS; SUBCUTANEOUS at 05:18

## 2017-01-24 RX ADMIN — ATORVASTATIN CALCIUM 40 MILLIGRAM(S): 80 TABLET, FILM COATED ORAL at 21:13

## 2017-01-24 RX ADMIN — QUETIAPINE FUMARATE 200 MILLIGRAM(S): 200 TABLET, FILM COATED ORAL at 21:13

## 2017-01-24 RX ADMIN — Medication 2: at 08:27

## 2017-01-24 RX ADMIN — Medication 4: at 17:19

## 2017-01-24 RX ADMIN — Medication 1 TABLET(S): at 12:47

## 2017-01-24 RX ADMIN — HEPARIN SODIUM 5000 UNIT(S): 5000 INJECTION INTRAVENOUS; SUBCUTANEOUS at 21:13

## 2017-01-25 LAB
BUN SERPL-MCNC: 32 MG/DL — HIGH (ref 7–23)
CALCIUM SERPL-MCNC: 9.1 MG/DL — SIGNIFICANT CHANGE UP (ref 8.4–10.5)
CHLORIDE SERPL-SCNC: 97 MMOL/L — LOW (ref 98–107)
CO2 SERPL-SCNC: 21 MMOL/L — LOW (ref 22–31)
CREAT SERPL-MCNC: 1.89 MG/DL — HIGH (ref 0.5–1.3)
GLUCOSE SERPL-MCNC: 263 MG/DL — HIGH (ref 70–99)
HCT VFR BLD CALC: 25.3 % — LOW (ref 34.5–45)
HGB BLD-MCNC: 8.5 G/DL — LOW (ref 11.5–15.5)
MCHC RBC-ENTMCNC: 27.7 PG — SIGNIFICANT CHANGE UP (ref 27–34)
MCHC RBC-ENTMCNC: 33.6 % — SIGNIFICANT CHANGE UP (ref 32–36)
MCV RBC AUTO: 82.4 FL — SIGNIFICANT CHANGE UP (ref 80–100)
PLATELET # BLD AUTO: 568 K/UL — HIGH (ref 150–400)
PMV BLD: 9.1 FL — SIGNIFICANT CHANGE UP (ref 7–13)
POTASSIUM SERPL-MCNC: 5.1 MMOL/L — SIGNIFICANT CHANGE UP (ref 3.5–5.3)
POTASSIUM SERPL-SCNC: 5.1 MMOL/L — SIGNIFICANT CHANGE UP (ref 3.5–5.3)
RBC # BLD: 3.07 M/UL — LOW (ref 3.8–5.2)
RBC # FLD: 16.3 % — HIGH (ref 10.3–14.5)
SODIUM SERPL-SCNC: 134 MMOL/L — LOW (ref 135–145)
SPECIMEN SOURCE: SIGNIFICANT CHANGE UP
WBC # BLD: 10.43 K/UL — SIGNIFICANT CHANGE UP (ref 3.8–10.5)
WBC # FLD AUTO: 10.43 K/UL — SIGNIFICANT CHANGE UP (ref 3.8–10.5)

## 2017-01-25 PROCEDURE — 99232 SBSQ HOSP IP/OBS MODERATE 35: CPT

## 2017-01-25 PROCEDURE — 99233 SBSQ HOSP IP/OBS HIGH 50: CPT | Mod: GC

## 2017-01-25 RX ADMIN — HEPARIN SODIUM 5000 UNIT(S): 5000 INJECTION INTRAVENOUS; SUBCUTANEOUS at 22:26

## 2017-01-25 RX ADMIN — Medication 6: at 13:34

## 2017-01-25 RX ADMIN — Medication 1 APPLICATION(S): at 12:54

## 2017-01-25 RX ADMIN — Medication 650 MILLIGRAM(S): at 00:07

## 2017-01-25 RX ADMIN — TIGECYCLINE 105 MILLIGRAM(S): 50 INJECTION, POWDER, LYOPHILIZED, FOR SOLUTION INTRAVENOUS at 18:29

## 2017-01-25 RX ADMIN — HEPARIN SODIUM 5000 UNIT(S): 5000 INJECTION INTRAVENOUS; SUBCUTANEOUS at 13:34

## 2017-01-25 RX ADMIN — Medication 650 MILLIGRAM(S): at 12:08

## 2017-01-25 RX ADMIN — HEPARIN SODIUM 5000 UNIT(S): 5000 INJECTION INTRAVENOUS; SUBCUTANEOUS at 05:01

## 2017-01-25 RX ADMIN — TIGECYCLINE 105 MILLIGRAM(S): 50 INJECTION, POWDER, LYOPHILIZED, FOR SOLUTION INTRAVENOUS at 05:02

## 2017-01-25 RX ADMIN — Medication 650 MILLIGRAM(S): at 11:38

## 2017-01-25 RX ADMIN — FLUCONAZOLE 200 MILLIGRAM(S): 150 TABLET ORAL at 13:34

## 2017-01-25 RX ADMIN — Medication 1 TABLET(S): at 13:34

## 2017-01-26 LAB
APPEARANCE UR: CLEAR — SIGNIFICANT CHANGE UP
BACTERIA # UR AUTO: SIGNIFICANT CHANGE UP
BASOPHILS # BLD AUTO: 0.04 K/UL — SIGNIFICANT CHANGE UP (ref 0–0.2)
BASOPHILS NFR BLD AUTO: 0.4 % — SIGNIFICANT CHANGE UP (ref 0–2)
BILIRUB UR-MCNC: NEGATIVE — SIGNIFICANT CHANGE UP
BLOOD UR QL VISUAL: NEGATIVE — SIGNIFICANT CHANGE UP
BUN SERPL-MCNC: 40 MG/DL — HIGH (ref 7–23)
CALCIUM SERPL-MCNC: 9 MG/DL — SIGNIFICANT CHANGE UP (ref 8.4–10.5)
CHLORIDE SERPL-SCNC: 99 MMOL/L — SIGNIFICANT CHANGE UP (ref 98–107)
CO2 SERPL-SCNC: 22 MMOL/L — SIGNIFICANT CHANGE UP (ref 22–31)
COLOR SPEC: SIGNIFICANT CHANGE UP
CREAT ?TM UR-MCNC: 28.58 MG/DL — SIGNIFICANT CHANGE UP
CREAT SERPL-MCNC: 1.94 MG/DL — HIGH (ref 0.5–1.3)
EOSINOPHIL # BLD AUTO: 0.27 K/UL — SIGNIFICANT CHANGE UP (ref 0–0.5)
EOSINOPHIL NFR BLD AUTO: 2.9 % — SIGNIFICANT CHANGE UP (ref 0–6)
EOSINOPHIL NFR URNS MANUAL: PRESENT — SIGNIFICANT CHANGE UP (ref 0–0)
GLUCOSE SERPL-MCNC: 155 MG/DL — HIGH (ref 70–99)
GLUCOSE UR-MCNC: NEGATIVE — SIGNIFICANT CHANGE UP
HCT VFR BLD CALC: 27.2 % — LOW (ref 34.5–45)
HGB BLD-MCNC: 9.2 G/DL — LOW (ref 11.5–15.5)
IMM GRANULOCYTES NFR BLD AUTO: 0.2 % — SIGNIFICANT CHANGE UP (ref 0–1.5)
KETONES UR-MCNC: NEGATIVE — SIGNIFICANT CHANGE UP
LEUKOCYTE ESTERASE UR-ACNC: NEGATIVE — SIGNIFICANT CHANGE UP
LYMPHOCYTES # BLD AUTO: 3.16 K/UL — SIGNIFICANT CHANGE UP (ref 1–3.3)
LYMPHOCYTES # BLD AUTO: 34.1 % — SIGNIFICANT CHANGE UP (ref 13–44)
MCHC RBC-ENTMCNC: 28 PG — SIGNIFICANT CHANGE UP (ref 27–34)
MCHC RBC-ENTMCNC: 33.8 % — SIGNIFICANT CHANGE UP (ref 32–36)
MCV RBC AUTO: 82.7 FL — SIGNIFICANT CHANGE UP (ref 80–100)
MONOCYTES # BLD AUTO: 1.03 K/UL — HIGH (ref 0–0.9)
MONOCYTES NFR BLD AUTO: 11.1 % — SIGNIFICANT CHANGE UP (ref 2–14)
MUCOUS THREADS # UR AUTO: SIGNIFICANT CHANGE UP
NEUTROPHILS # BLD AUTO: 4.75 K/UL — SIGNIFICANT CHANGE UP (ref 1.8–7.4)
NEUTROPHILS NFR BLD AUTO: 51.3 % — SIGNIFICANT CHANGE UP (ref 43–77)
NITRITE UR-MCNC: NEGATIVE — SIGNIFICANT CHANGE UP
NON-SQ EPI CELLS # UR AUTO: 1 — SIGNIFICANT CHANGE UP
OSMOLALITY UR: 340 MOSMO/KG — SIGNIFICANT CHANGE UP (ref 50–1200)
PH UR: 7 — SIGNIFICANT CHANGE UP (ref 4.6–8)
PLATELET # BLD AUTO: 650 K/UL — HIGH (ref 150–400)
PMV BLD: 9.6 FL — SIGNIFICANT CHANGE UP (ref 7–13)
POTASSIUM SERPL-MCNC: 4.6 MMOL/L — SIGNIFICANT CHANGE UP (ref 3.5–5.3)
POTASSIUM SERPL-SCNC: 4.6 MMOL/L — SIGNIFICANT CHANGE UP (ref 3.5–5.3)
PROT UR-MCNC: 30 — HIGH
PROT UR-MCNC: 34.9 MG/DL — SIGNIFICANT CHANGE UP
RBC # BLD: 3.29 M/UL — LOW (ref 3.8–5.2)
RBC # FLD: 16 % — HIGH (ref 10.3–14.5)
RBC CASTS # UR COMP ASSIST: SIGNIFICANT CHANGE UP (ref 0–?)
SODIUM SERPL-SCNC: 136 MMOL/L — SIGNIFICANT CHANGE UP (ref 135–145)
SODIUM UR-SCNC: 79 MEQ/L — SIGNIFICANT CHANGE UP
SP GR SPEC: 1.01 — SIGNIFICANT CHANGE UP (ref 1–1.03)
SQUAMOUS # UR AUTO: SIGNIFICANT CHANGE UP
UROBILINOGEN FLD QL: NORMAL E.U. — SIGNIFICANT CHANGE UP (ref 0.1–0.2)
UUN UR-MCNC: 393.3 MG/DL — SIGNIFICANT CHANGE UP
WBC # BLD: 9.27 K/UL — SIGNIFICANT CHANGE UP (ref 3.8–10.5)
WBC # FLD AUTO: 9.27 K/UL — SIGNIFICANT CHANGE UP (ref 3.8–10.5)
WBC UR QL: HIGH (ref 0–?)

## 2017-01-26 PROCEDURE — 99233 SBSQ HOSP IP/OBS HIGH 50: CPT | Mod: GC

## 2017-01-26 PROCEDURE — 99232 SBSQ HOSP IP/OBS MODERATE 35: CPT

## 2017-01-26 RX ADMIN — Medication 650 MILLIGRAM(S): at 09:44

## 2017-01-26 RX ADMIN — TIGECYCLINE 105 MILLIGRAM(S): 50 INJECTION, POWDER, LYOPHILIZED, FOR SOLUTION INTRAVENOUS at 18:01

## 2017-01-26 RX ADMIN — HEPARIN SODIUM 5000 UNIT(S): 5000 INJECTION INTRAVENOUS; SUBCUTANEOUS at 21:39

## 2017-01-26 RX ADMIN — HEPARIN SODIUM 5000 UNIT(S): 5000 INJECTION INTRAVENOUS; SUBCUTANEOUS at 05:02

## 2017-01-26 RX ADMIN — ATORVASTATIN CALCIUM 40 MILLIGRAM(S): 80 TABLET, FILM COATED ORAL at 21:40

## 2017-01-26 RX ADMIN — Medication 1 TABLET(S): at 12:01

## 2017-01-26 RX ADMIN — TIGECYCLINE 105 MILLIGRAM(S): 50 INJECTION, POWDER, LYOPHILIZED, FOR SOLUTION INTRAVENOUS at 05:02

## 2017-01-26 RX ADMIN — Medication 650 MILLIGRAM(S): at 20:58

## 2017-01-26 RX ADMIN — HEPARIN SODIUM 5000 UNIT(S): 5000 INJECTION INTRAVENOUS; SUBCUTANEOUS at 14:03

## 2017-01-26 RX ADMIN — QUETIAPINE FUMARATE 200 MILLIGRAM(S): 200 TABLET, FILM COATED ORAL at 21:40

## 2017-01-26 RX ADMIN — Medication 650 MILLIGRAM(S): at 20:18

## 2017-01-26 RX ADMIN — FLUCONAZOLE 200 MILLIGRAM(S): 150 TABLET ORAL at 14:03

## 2017-01-26 RX ADMIN — Medication 650 MILLIGRAM(S): at 09:14

## 2017-01-26 RX ADMIN — Medication 1 APPLICATION(S): at 12:05

## 2017-01-27 LAB
BACTERIA UR CULT: SIGNIFICANT CHANGE UP
BUN SERPL-MCNC: 41 MG/DL — HIGH (ref 7–23)
CALCIUM SERPL-MCNC: 9 MG/DL — SIGNIFICANT CHANGE UP (ref 8.4–10.5)
CHLORIDE SERPL-SCNC: 101 MMOL/L — SIGNIFICANT CHANGE UP (ref 98–107)
CO2 SERPL-SCNC: 19 MMOL/L — LOW (ref 22–31)
CREAT SERPL-MCNC: 1.85 MG/DL — HIGH (ref 0.5–1.3)
GLUCOSE SERPL-MCNC: 188 MG/DL — HIGH (ref 70–99)
HCT VFR BLD CALC: 27.2 % — LOW (ref 34.5–45)
HGB BLD-MCNC: 9.2 G/DL — LOW (ref 11.5–15.5)
MCHC RBC-ENTMCNC: 28 PG — SIGNIFICANT CHANGE UP (ref 27–34)
MCHC RBC-ENTMCNC: 33.8 % — SIGNIFICANT CHANGE UP (ref 32–36)
MCV RBC AUTO: 82.9 FL — SIGNIFICANT CHANGE UP (ref 80–100)
PLATELET # BLD AUTO: 662 K/UL — HIGH (ref 150–400)
PMV BLD: 9.5 FL — SIGNIFICANT CHANGE UP (ref 7–13)
POTASSIUM SERPL-MCNC: 4.7 MMOL/L — SIGNIFICANT CHANGE UP (ref 3.5–5.3)
POTASSIUM SERPL-SCNC: 4.7 MMOL/L — SIGNIFICANT CHANGE UP (ref 3.5–5.3)
RBC # BLD: 3.28 M/UL — LOW (ref 3.8–5.2)
RBC # FLD: 15.7 % — HIGH (ref 10.3–14.5)
SODIUM SERPL-SCNC: 135 MMOL/L — SIGNIFICANT CHANGE UP (ref 135–145)
SPECIMEN SOURCE: SIGNIFICANT CHANGE UP
WBC # BLD: 9.65 K/UL — SIGNIFICANT CHANGE UP (ref 3.8–10.5)
WBC # FLD AUTO: 9.65 K/UL — SIGNIFICANT CHANGE UP (ref 3.8–10.5)

## 2017-01-27 PROCEDURE — 99232 SBSQ HOSP IP/OBS MODERATE 35: CPT

## 2017-01-27 PROCEDURE — 99233 SBSQ HOSP IP/OBS HIGH 50: CPT | Mod: GC

## 2017-01-27 RX ADMIN — HEPARIN SODIUM 5000 UNIT(S): 5000 INJECTION INTRAVENOUS; SUBCUTANEOUS at 13:12

## 2017-01-27 RX ADMIN — ATORVASTATIN CALCIUM 40 MILLIGRAM(S): 80 TABLET, FILM COATED ORAL at 21:24

## 2017-01-27 RX ADMIN — Medication 1 APPLICATION(S): at 13:11

## 2017-01-27 RX ADMIN — Medication 650 MILLIGRAM(S): at 23:00

## 2017-01-27 RX ADMIN — HEPARIN SODIUM 5000 UNIT(S): 5000 INJECTION INTRAVENOUS; SUBCUTANEOUS at 05:19

## 2017-01-27 RX ADMIN — FLUCONAZOLE 200 MILLIGRAM(S): 150 TABLET ORAL at 13:11

## 2017-01-27 RX ADMIN — QUETIAPINE FUMARATE 200 MILLIGRAM(S): 200 TABLET, FILM COATED ORAL at 21:24

## 2017-01-27 RX ADMIN — Medication 1 TABLET(S): at 13:11

## 2017-01-27 RX ADMIN — TIGECYCLINE 105 MILLIGRAM(S): 50 INJECTION, POWDER, LYOPHILIZED, FOR SOLUTION INTRAVENOUS at 05:19

## 2017-01-27 RX ADMIN — Medication 2: at 12:21

## 2017-01-27 RX ADMIN — Medication 650 MILLIGRAM(S): at 22:10

## 2017-01-27 RX ADMIN — HEPARIN SODIUM 5000 UNIT(S): 5000 INJECTION INTRAVENOUS; SUBCUTANEOUS at 21:24

## 2017-01-27 RX ADMIN — TIGECYCLINE 105 MILLIGRAM(S): 50 INJECTION, POWDER, LYOPHILIZED, FOR SOLUTION INTRAVENOUS at 18:43

## 2017-01-28 LAB
BACTERIA BLD CULT: SIGNIFICANT CHANGE UP
BASOPHILS # BLD AUTO: 0.02 K/UL — SIGNIFICANT CHANGE UP (ref 0–0.2)
BASOPHILS NFR BLD AUTO: 0.3 % — SIGNIFICANT CHANGE UP (ref 0–2)
BUN SERPL-MCNC: 38 MG/DL — HIGH (ref 7–23)
C3 SERPL-MCNC: 109.4 MG/DL — SIGNIFICANT CHANGE UP (ref 90–180)
C4 SERPL-MCNC: 27.7 MG/DL — SIGNIFICANT CHANGE UP (ref 10–40)
CALCIUM SERPL-MCNC: 8.7 MG/DL — SIGNIFICANT CHANGE UP (ref 8.4–10.5)
CHLORIDE SERPL-SCNC: 102 MMOL/L — SIGNIFICANT CHANGE UP (ref 98–107)
CO2 SERPL-SCNC: 21 MMOL/L — LOW (ref 22–31)
CREAT SERPL-MCNC: 1.67 MG/DL — HIGH (ref 0.5–1.3)
EOSINOPHIL # BLD AUTO: 0.15 K/UL — SIGNIFICANT CHANGE UP (ref 0–0.5)
EOSINOPHIL NFR BLD AUTO: 2 % — SIGNIFICANT CHANGE UP (ref 0–6)
GLUCOSE SERPL-MCNC: 126 MG/DL — HIGH (ref 70–99)
HCT VFR BLD CALC: 21.9 % — LOW (ref 34.5–45)
HGB BLD-MCNC: 7.5 G/DL — LOW (ref 11.5–15.5)
IMM GRANULOCYTES NFR BLD AUTO: 0.4 % — SIGNIFICANT CHANGE UP (ref 0–1.5)
LYMPHOCYTES # BLD AUTO: 2.47 K/UL — SIGNIFICANT CHANGE UP (ref 1–3.3)
LYMPHOCYTES # BLD AUTO: 33 % — SIGNIFICANT CHANGE UP (ref 13–44)
MCHC RBC-ENTMCNC: 27.8 PG — SIGNIFICANT CHANGE UP (ref 27–34)
MCHC RBC-ENTMCNC: 34.2 % — SIGNIFICANT CHANGE UP (ref 32–36)
MCV RBC AUTO: 81.1 FL — SIGNIFICANT CHANGE UP (ref 80–100)
MONOCYTES # BLD AUTO: 0.58 K/UL — SIGNIFICANT CHANGE UP (ref 0–0.9)
MONOCYTES NFR BLD AUTO: 7.7 % — SIGNIFICANT CHANGE UP (ref 2–14)
NEUTROPHILS # BLD AUTO: 4.24 K/UL — SIGNIFICANT CHANGE UP (ref 1.8–7.4)
NEUTROPHILS NFR BLD AUTO: 56.6 % — SIGNIFICANT CHANGE UP (ref 43–77)
PLATELET # BLD AUTO: 602 K/UL — HIGH (ref 150–400)
PMV BLD: 9.3 FL — SIGNIFICANT CHANGE UP (ref 7–13)
POTASSIUM SERPL-MCNC: 4.3 MMOL/L — SIGNIFICANT CHANGE UP (ref 3.5–5.3)
POTASSIUM SERPL-SCNC: 4.3 MMOL/L — SIGNIFICANT CHANGE UP (ref 3.5–5.3)
RBC # BLD: 2.7 M/UL — LOW (ref 3.8–5.2)
RBC # FLD: 15.5 % — HIGH (ref 10.3–14.5)
SODIUM SERPL-SCNC: 138 MMOL/L — SIGNIFICANT CHANGE UP (ref 135–145)
WBC # BLD: 7.49 K/UL — SIGNIFICANT CHANGE UP (ref 3.8–10.5)
WBC # FLD AUTO: 7.49 K/UL — SIGNIFICANT CHANGE UP (ref 3.8–10.5)

## 2017-01-28 PROCEDURE — 99232 SBSQ HOSP IP/OBS MODERATE 35: CPT | Mod: GC

## 2017-01-28 RX ORDER — AMLODIPINE BESYLATE 2.5 MG/1
2.5 TABLET ORAL DAILY
Qty: 0 | Refills: 0 | Status: DISCONTINUED | OUTPATIENT
Start: 2017-01-28 | End: 2017-01-29

## 2017-01-28 RX ADMIN — QUETIAPINE FUMARATE 200 MILLIGRAM(S): 200 TABLET, FILM COATED ORAL at 22:54

## 2017-01-28 RX ADMIN — HEPARIN SODIUM 5000 UNIT(S): 5000 INJECTION INTRAVENOUS; SUBCUTANEOUS at 06:18

## 2017-01-28 RX ADMIN — FLUCONAZOLE 200 MILLIGRAM(S): 150 TABLET ORAL at 14:47

## 2017-01-28 RX ADMIN — TIGECYCLINE 105 MILLIGRAM(S): 50 INJECTION, POWDER, LYOPHILIZED, FOR SOLUTION INTRAVENOUS at 06:19

## 2017-01-28 RX ADMIN — HEPARIN SODIUM 5000 UNIT(S): 5000 INJECTION INTRAVENOUS; SUBCUTANEOUS at 14:48

## 2017-01-28 RX ADMIN — Medication 1 TABLET(S): at 14:47

## 2017-01-28 RX ADMIN — Medication 2: at 18:00

## 2017-01-28 RX ADMIN — ATORVASTATIN CALCIUM 40 MILLIGRAM(S): 80 TABLET, FILM COATED ORAL at 22:54

## 2017-01-28 RX ADMIN — Medication 1 APPLICATION(S): at 11:16

## 2017-01-28 RX ADMIN — TIGECYCLINE 105 MILLIGRAM(S): 50 INJECTION, POWDER, LYOPHILIZED, FOR SOLUTION INTRAVENOUS at 18:01

## 2017-01-28 RX ADMIN — HEPARIN SODIUM 5000 UNIT(S): 5000 INJECTION INTRAVENOUS; SUBCUTANEOUS at 22:54

## 2017-01-28 RX ADMIN — AMLODIPINE BESYLATE 2.5 MILLIGRAM(S): 2.5 TABLET ORAL at 08:43

## 2017-01-29 LAB
BACTERIA BLD CULT: SIGNIFICANT CHANGE UP
BASOPHILS # BLD AUTO: 0.02 K/UL — SIGNIFICANT CHANGE UP (ref 0–0.2)
BASOPHILS NFR BLD AUTO: 0.2 % — SIGNIFICANT CHANGE UP (ref 0–2)
BUN SERPL-MCNC: 35 MG/DL — HIGH (ref 7–23)
CALCIUM SERPL-MCNC: 8.5 MG/DL — SIGNIFICANT CHANGE UP (ref 8.4–10.5)
CHLORIDE SERPL-SCNC: 102 MMOL/L — SIGNIFICANT CHANGE UP (ref 98–107)
CO2 SERPL-SCNC: 21 MMOL/L — LOW (ref 22–31)
CREAT SERPL-MCNC: 1.64 MG/DL — HIGH (ref 0.5–1.3)
EOSINOPHIL # BLD AUTO: 0.35 K/UL — SIGNIFICANT CHANGE UP (ref 0–0.5)
EOSINOPHIL NFR BLD AUTO: 3.9 % — SIGNIFICANT CHANGE UP (ref 0–6)
GLUCOSE SERPL-MCNC: 103 MG/DL — HIGH (ref 70–99)
HCT VFR BLD CALC: 22.9 % — LOW (ref 34.5–45)
HGB BLD-MCNC: 7.9 G/DL — LOW (ref 11.5–15.5)
IMM GRANULOCYTES NFR BLD AUTO: 0.4 % — SIGNIFICANT CHANGE UP (ref 0–1.5)
LYMPHOCYTES # BLD AUTO: 3.95 K/UL — HIGH (ref 1–3.3)
LYMPHOCYTES # BLD AUTO: 44.1 % — HIGH (ref 13–44)
MCHC RBC-ENTMCNC: 28.2 PG — SIGNIFICANT CHANGE UP (ref 27–34)
MCHC RBC-ENTMCNC: 34.5 % — SIGNIFICANT CHANGE UP (ref 32–36)
MCV RBC AUTO: 81.8 FL — SIGNIFICANT CHANGE UP (ref 80–100)
MONOCYTES # BLD AUTO: 0.88 K/UL — SIGNIFICANT CHANGE UP (ref 0–0.9)
MONOCYTES NFR BLD AUTO: 9.8 % — SIGNIFICANT CHANGE UP (ref 2–14)
NEUTROPHILS # BLD AUTO: 3.71 K/UL — SIGNIFICANT CHANGE UP (ref 1.8–7.4)
NEUTROPHILS NFR BLD AUTO: 41.6 % — LOW (ref 43–77)
PLATELET # BLD AUTO: 631 K/UL — HIGH (ref 150–400)
PMV BLD: 9.4 FL — SIGNIFICANT CHANGE UP (ref 7–13)
POTASSIUM SERPL-MCNC: 4.1 MMOL/L — SIGNIFICANT CHANGE UP (ref 3.5–5.3)
POTASSIUM SERPL-SCNC: 4.1 MMOL/L — SIGNIFICANT CHANGE UP (ref 3.5–5.3)
RBC # BLD: 2.8 M/UL — LOW (ref 3.8–5.2)
RBC # FLD: 15.4 % — HIGH (ref 10.3–14.5)
SODIUM SERPL-SCNC: 137 MMOL/L — SIGNIFICANT CHANGE UP (ref 135–145)
WBC # BLD: 8.95 K/UL — SIGNIFICANT CHANGE UP (ref 3.8–10.5)
WBC # FLD AUTO: 8.95 K/UL — SIGNIFICANT CHANGE UP (ref 3.8–10.5)

## 2017-01-29 PROCEDURE — 99232 SBSQ HOSP IP/OBS MODERATE 35: CPT | Mod: GC

## 2017-01-29 RX ORDER — AMLODIPINE BESYLATE 2.5 MG/1
5 TABLET ORAL DAILY
Qty: 0 | Refills: 0 | Status: DISCONTINUED | OUTPATIENT
Start: 2017-01-29 | End: 2017-02-04

## 2017-01-29 RX ADMIN — ATORVASTATIN CALCIUM 40 MILLIGRAM(S): 80 TABLET, FILM COATED ORAL at 21:59

## 2017-01-29 RX ADMIN — FLUCONAZOLE 200 MILLIGRAM(S): 150 TABLET ORAL at 13:01

## 2017-01-29 RX ADMIN — Medication 650 MILLIGRAM(S): at 17:06

## 2017-01-29 RX ADMIN — Medication 2: at 17:49

## 2017-01-29 RX ADMIN — TIGECYCLINE 105 MILLIGRAM(S): 50 INJECTION, POWDER, LYOPHILIZED, FOR SOLUTION INTRAVENOUS at 06:47

## 2017-01-29 RX ADMIN — TIGECYCLINE 105 MILLIGRAM(S): 50 INJECTION, POWDER, LYOPHILIZED, FOR SOLUTION INTRAVENOUS at 17:06

## 2017-01-29 RX ADMIN — Medication 1 APPLICATION(S): at 13:00

## 2017-01-29 RX ADMIN — HEPARIN SODIUM 5000 UNIT(S): 5000 INJECTION INTRAVENOUS; SUBCUTANEOUS at 06:48

## 2017-01-29 RX ADMIN — Medication 2: at 12:57

## 2017-01-29 RX ADMIN — AMLODIPINE BESYLATE 5 MILLIGRAM(S): 2.5 TABLET ORAL at 22:08

## 2017-01-29 RX ADMIN — QUETIAPINE FUMARATE 200 MILLIGRAM(S): 200 TABLET, FILM COATED ORAL at 22:00

## 2017-01-29 RX ADMIN — AMLODIPINE BESYLATE 2.5 MILLIGRAM(S): 2.5 TABLET ORAL at 06:49

## 2017-01-29 RX ADMIN — HEPARIN SODIUM 5000 UNIT(S): 5000 INJECTION INTRAVENOUS; SUBCUTANEOUS at 13:07

## 2017-01-29 RX ADMIN — Medication 1 TABLET(S): at 13:01

## 2017-01-29 RX ADMIN — HEPARIN SODIUM 5000 UNIT(S): 5000 INJECTION INTRAVENOUS; SUBCUTANEOUS at 22:00

## 2017-01-29 RX ADMIN — Medication 650 MILLIGRAM(S): at 18:00

## 2017-01-30 PROCEDURE — 99232 SBSQ HOSP IP/OBS MODERATE 35: CPT | Mod: GC

## 2017-01-30 PROCEDURE — 76770 US EXAM ABDO BACK WALL COMP: CPT | Mod: 26

## 2017-01-30 PROCEDURE — 99232 SBSQ HOSP IP/OBS MODERATE 35: CPT

## 2017-01-30 RX ORDER — ALTEPLASE 100 MG
2 KIT INTRAVENOUS ONCE
Qty: 0 | Refills: 0 | Status: DISCONTINUED | OUTPATIENT
Start: 2017-01-30 | End: 2017-02-01

## 2017-01-30 RX ADMIN — Medication 2: at 12:54

## 2017-01-30 RX ADMIN — HEPARIN SODIUM 5000 UNIT(S): 5000 INJECTION INTRAVENOUS; SUBCUTANEOUS at 22:05

## 2017-01-30 RX ADMIN — QUETIAPINE FUMARATE 200 MILLIGRAM(S): 200 TABLET, FILM COATED ORAL at 22:06

## 2017-01-30 RX ADMIN — AMLODIPINE BESYLATE 5 MILLIGRAM(S): 2.5 TABLET ORAL at 05:39

## 2017-01-30 RX ADMIN — Medication 1 TABLET(S): at 12:55

## 2017-01-30 RX ADMIN — Medication 650 MILLIGRAM(S): at 23:06

## 2017-01-30 RX ADMIN — HEPARIN SODIUM 5000 UNIT(S): 5000 INJECTION INTRAVENOUS; SUBCUTANEOUS at 05:39

## 2017-01-30 RX ADMIN — Medication 650 MILLIGRAM(S): at 22:06

## 2017-01-30 RX ADMIN — FLUCONAZOLE 200 MILLIGRAM(S): 150 TABLET ORAL at 12:56

## 2017-01-30 RX ADMIN — Medication 6: at 18:22

## 2017-01-30 RX ADMIN — ATORVASTATIN CALCIUM 40 MILLIGRAM(S): 80 TABLET, FILM COATED ORAL at 22:05

## 2017-01-30 RX ADMIN — Medication 1 APPLICATION(S): at 12:54

## 2017-01-30 RX ADMIN — HEPARIN SODIUM 5000 UNIT(S): 5000 INJECTION INTRAVENOUS; SUBCUTANEOUS at 13:01

## 2017-01-30 RX ADMIN — TIGECYCLINE 105 MILLIGRAM(S): 50 INJECTION, POWDER, LYOPHILIZED, FOR SOLUTION INTRAVENOUS at 05:40

## 2017-01-30 NOTE — PROVIDER CONTACT NOTE (OTHER) - DATE AND TIME:
12-Jan-2017 03:30
15-Gabino-2017 14:08
22-Jan-2017 04:25
22-Jan-2017 22:45
23-Jan-2017 16:03
27-Jan-2017 22:47
28-Jan-2017 17:04
29-Jan-2017 22:47
29-Jan-2017 12:41

## 2017-01-31 LAB
BUN SERPL-MCNC: 29 MG/DL — HIGH (ref 7–23)
CALCIUM SERPL-MCNC: 8.2 MG/DL — LOW (ref 8.4–10.5)
CHLORIDE SERPL-SCNC: 103 MMOL/L — SIGNIFICANT CHANGE UP (ref 98–107)
CO2 SERPL-SCNC: 25 MMOL/L — SIGNIFICANT CHANGE UP (ref 22–31)
CREAT SERPL-MCNC: 1.56 MG/DL — HIGH (ref 0.5–1.3)
GLUCOSE SERPL-MCNC: 124 MG/DL — HIGH (ref 70–99)
HCT VFR BLD CALC: 23.7 % — LOW (ref 34.5–45)
HGB BLD-MCNC: 8.1 G/DL — LOW (ref 11.5–15.5)
MCHC RBC-ENTMCNC: 27.8 PG — SIGNIFICANT CHANGE UP (ref 27–34)
MCHC RBC-ENTMCNC: 34.2 % — SIGNIFICANT CHANGE UP (ref 32–36)
MCV RBC AUTO: 81.4 FL — SIGNIFICANT CHANGE UP (ref 80–100)
PLATELET # BLD AUTO: 546 K/UL — HIGH (ref 150–400)
PMV BLD: 9.2 FL — SIGNIFICANT CHANGE UP (ref 7–13)
POTASSIUM SERPL-MCNC: 3.9 MMOL/L — SIGNIFICANT CHANGE UP (ref 3.5–5.3)
POTASSIUM SERPL-SCNC: 3.9 MMOL/L — SIGNIFICANT CHANGE UP (ref 3.5–5.3)
RBC # BLD: 2.91 M/UL — LOW (ref 3.8–5.2)
RBC # FLD: 15.4 % — HIGH (ref 10.3–14.5)
SODIUM SERPL-SCNC: 137 MMOL/L — SIGNIFICANT CHANGE UP (ref 135–145)
WBC # BLD: 11.6 K/UL — HIGH (ref 3.8–10.5)
WBC # FLD AUTO: 11.6 K/UL — HIGH (ref 3.8–10.5)

## 2017-01-31 PROCEDURE — 36584 COMPL RPLCMT PICC RS&I: CPT

## 2017-01-31 PROCEDURE — 99232 SBSQ HOSP IP/OBS MODERATE 35: CPT | Mod: GC

## 2017-01-31 PROCEDURE — 99232 SBSQ HOSP IP/OBS MODERATE 35: CPT

## 2017-01-31 PROCEDURE — 77001 FLUOROGUIDE FOR VEIN DEVICE: CPT | Mod: 26,GC

## 2017-01-31 RX ADMIN — Medication 1 APPLICATION(S): at 12:31

## 2017-01-31 RX ADMIN — HEPARIN SODIUM 5000 UNIT(S): 5000 INJECTION INTRAVENOUS; SUBCUTANEOUS at 15:31

## 2017-01-31 RX ADMIN — Medication 1 TABLET(S): at 12:30

## 2017-01-31 RX ADMIN — HEPARIN SODIUM 5000 UNIT(S): 5000 INJECTION INTRAVENOUS; SUBCUTANEOUS at 06:48

## 2017-01-31 RX ADMIN — TIGECYCLINE 105 MILLIGRAM(S): 50 INJECTION, POWDER, LYOPHILIZED, FOR SOLUTION INTRAVENOUS at 17:56

## 2017-01-31 RX ADMIN — FLUCONAZOLE 200 MILLIGRAM(S): 150 TABLET ORAL at 12:30

## 2017-01-31 RX ADMIN — HEPARIN SODIUM 5000 UNIT(S): 5000 INJECTION INTRAVENOUS; SUBCUTANEOUS at 21:00

## 2017-01-31 RX ADMIN — ATORVASTATIN CALCIUM 40 MILLIGRAM(S): 80 TABLET, FILM COATED ORAL at 21:00

## 2017-01-31 RX ADMIN — AMLODIPINE BESYLATE 5 MILLIGRAM(S): 2.5 TABLET ORAL at 06:48

## 2017-01-31 RX ADMIN — QUETIAPINE FUMARATE 200 MILLIGRAM(S): 200 TABLET, FILM COATED ORAL at 21:00

## 2017-01-31 RX ADMIN — Medication 4: at 12:29

## 2017-01-31 NOTE — PROVIDER CONTACT NOTE (OTHER) - BACKGROUND
Patient admitted with decubitus ulcer of sacral region
Pt. came in for PU on sacrum, sepsis.
HTN DM
Patient admitted with sacral ulcer sepsis.
Pt admitted with sepsis
Pt admitted with sepsis
pt admitted due to pressure ulcer
pt is admitted with sacral wound infection
pt is admitted with sacral wound infection
recent admission in [facility] for septic shock 2/2 sacral wound infection s/p debridement over the weekend and transferred here for management of sacral wound

## 2017-01-31 NOTE — PROVIDER CONTACT NOTE (OTHER) - RECOMMENDATIONS
Give apple juice.
Verified with surgery that the RN admitting the patient is unable to assess wound?
Wound care team was called, they are not it. Cover the dressing and await for wound care to redo the wound vac.
MD will order tylenol and lab work
Md informed. None for now.
Orthostatic BP
continue current management
continue to monitor.
notified MD
notified MD

## 2017-01-31 NOTE — PROVIDER CONTACT NOTE (OTHER) - REASON
/78
Blood glucose low
Patient c/o dizziness x 3 days
Pt. wound vac dressing is loose and saturated in feces. Unable to redress the wound vac.
elevated bp
elevated bp
pulse 108
temp 101.6
wound culture-e coli esbl positive
verifying RN's instructions not to remove pressure ulcer dressing

## 2017-01-31 NOTE — PROVIDER CONTACT NOTE (OTHER) - ACTION/TREATMENT ORDERED:
As per surgery, do not remove dressing.  Surgical wound care will come in the morning to remove dressing and re-evaluate wound.
Cover the sacrum with a wet to dry dressing. Contact wound care nurse tomorrow to redo the wound vac dressing.
No humalog needed. Give apple juice.
None for now.
Will do orthostatic BP as per MD order.
continue to assess
given prn Tylenol 650 mg po for headache. will continue monitoring.
no new orders. Md aware.
pt received amlodipine 5 mg po with good effect. will continue monitoring.
tylenol and lab work

## 2017-01-31 NOTE — PROVIDER CONTACT NOTE (OTHER) - SITUATION
Patient Blood sugar 89 after 2nd attempt
Pt. wound vac dressing is loose and saturated in feces. Unable to redress the wound vac.
/78
As per instructions by BURKE Avila during handoff report, surgery stated the dressing on sacral ulcer is not to be removed until they come back in the morning to re-evaluate.  Is this verified?
Patient c/o dizziness x 3 days
Patient with 108 pulse no c/o symptoms.
pt's bp is 173/78
pt's bp is 176/78
temp 101.6
wound culture result --ecoli esbl positive

## 2017-01-31 NOTE — PROVIDER CONTACT NOTE (OTHER) - ASSESSMENT
Patient presented with Stage IV decubitus sacral ulcer extending to buttocks, unable to visualize due to instructions by surgery not to remove the wet to dry dressing in place.
VSS.  Reassessed Fingerstick
pt. stable, she had a large BM and moved which made the dressing loose and saturated.
Pt resting comfortably at this time
She is alert and calm  with no c/o symptoms or pain.
pt is alert and oriented. has complaints of headache.
pt is alert and oriented. has complaints of headache.
pt resting comfortably
unstageable pressure ulcer
v/s. rounding q 2hrs

## 2017-02-01 LAB
BUN SERPL-MCNC: 28 MG/DL — HIGH (ref 7–23)
CALCIUM SERPL-MCNC: 8.1 MG/DL — LOW (ref 8.4–10.5)
CHLORIDE SERPL-SCNC: 101 MMOL/L — SIGNIFICANT CHANGE UP (ref 98–107)
CO2 SERPL-SCNC: 22 MMOL/L — SIGNIFICANT CHANGE UP (ref 22–31)
CREAT SERPL-MCNC: 1.59 MG/DL — HIGH (ref 0.5–1.3)
GLUCOSE SERPL-MCNC: 121 MG/DL — HIGH (ref 70–99)
HCT VFR BLD CALC: 23.6 % — LOW (ref 34.5–45)
HGB BLD-MCNC: 8 G/DL — LOW (ref 11.5–15.5)
MCHC RBC-ENTMCNC: 27.9 PG — SIGNIFICANT CHANGE UP (ref 27–34)
MCHC RBC-ENTMCNC: 33.9 % — SIGNIFICANT CHANGE UP (ref 32–36)
MCV RBC AUTO: 82.2 FL — SIGNIFICANT CHANGE UP (ref 80–100)
PLATELET # BLD AUTO: 601 K/UL — HIGH (ref 150–400)
PMV BLD: 9.4 FL — SIGNIFICANT CHANGE UP (ref 7–13)
POTASSIUM SERPL-MCNC: 4 MMOL/L — SIGNIFICANT CHANGE UP (ref 3.5–5.3)
POTASSIUM SERPL-SCNC: 4 MMOL/L — SIGNIFICANT CHANGE UP (ref 3.5–5.3)
RBC # BLD: 2.87 M/UL — LOW (ref 3.8–5.2)
RBC # FLD: 15.4 % — HIGH (ref 10.3–14.5)
SODIUM SERPL-SCNC: 137 MMOL/L — SIGNIFICANT CHANGE UP (ref 135–145)
WBC # BLD: 9.54 K/UL — SIGNIFICANT CHANGE UP (ref 3.8–10.5)
WBC # FLD AUTO: 9.54 K/UL — SIGNIFICANT CHANGE UP (ref 3.8–10.5)

## 2017-02-01 PROCEDURE — 99232 SBSQ HOSP IP/OBS MODERATE 35: CPT | Mod: GC

## 2017-02-01 RX ORDER — ROSUVASTATIN CALCIUM 5 MG/1
1 TABLET ORAL
Qty: 0 | Refills: 0 | COMMUNITY

## 2017-02-01 RX ORDER — AMLODIPINE BESYLATE 2.5 MG/1
1 TABLET ORAL
Qty: 0 | Refills: 0 | COMMUNITY
Start: 2017-02-01

## 2017-02-01 RX ORDER — COLLAGENASE CLOSTRIDIUM HIST. 250 UNIT/G
1 OINTMENT (GRAM) TOPICAL
Qty: 0 | Refills: 0 | COMMUNITY
Start: 2017-02-01

## 2017-02-01 RX ORDER — METFORMIN HYDROCHLORIDE 850 MG/1
1 TABLET ORAL
Qty: 0 | Refills: 0 | COMMUNITY

## 2017-02-01 RX ORDER — ATORVASTATIN CALCIUM 80 MG/1
1 TABLET, FILM COATED ORAL
Qty: 0 | Refills: 0 | COMMUNITY
Start: 2017-02-01

## 2017-02-01 RX ORDER — FLUCONAZOLE 150 MG/1
1 TABLET ORAL
Qty: 0 | Refills: 0 | COMMUNITY
Start: 2017-02-01

## 2017-02-01 RX ADMIN — SENNA PLUS 2 TABLET(S): 8.6 TABLET ORAL at 22:33

## 2017-02-01 RX ADMIN — Medication 1 APPLICATION(S): at 13:03

## 2017-02-01 RX ADMIN — QUETIAPINE FUMARATE 200 MILLIGRAM(S): 200 TABLET, FILM COATED ORAL at 22:33

## 2017-02-01 RX ADMIN — HEPARIN SODIUM 5000 UNIT(S): 5000 INJECTION INTRAVENOUS; SUBCUTANEOUS at 22:33

## 2017-02-01 RX ADMIN — Medication 100 MILLIGRAM(S): at 22:33

## 2017-02-01 RX ADMIN — HEPARIN SODIUM 5000 UNIT(S): 5000 INJECTION INTRAVENOUS; SUBCUTANEOUS at 06:56

## 2017-02-01 RX ADMIN — HEPARIN SODIUM 5000 UNIT(S): 5000 INJECTION INTRAVENOUS; SUBCUTANEOUS at 13:03

## 2017-02-01 RX ADMIN — ATORVASTATIN CALCIUM 40 MILLIGRAM(S): 80 TABLET, FILM COATED ORAL at 22:33

## 2017-02-01 RX ADMIN — FLUCONAZOLE 200 MILLIGRAM(S): 150 TABLET ORAL at 13:02

## 2017-02-01 RX ADMIN — TIGECYCLINE 105 MILLIGRAM(S): 50 INJECTION, POWDER, LYOPHILIZED, FOR SOLUTION INTRAVENOUS at 06:56

## 2017-02-01 RX ADMIN — Medication 100 MILLIGRAM(S): at 06:56

## 2017-02-01 RX ADMIN — AMLODIPINE BESYLATE 5 MILLIGRAM(S): 2.5 TABLET ORAL at 06:56

## 2017-02-01 RX ADMIN — TIGECYCLINE 105 MILLIGRAM(S): 50 INJECTION, POWDER, LYOPHILIZED, FOR SOLUTION INTRAVENOUS at 17:20

## 2017-02-01 RX ADMIN — Medication 1 TABLET(S): at 13:02

## 2017-02-01 RX ADMIN — Medication 2: at 12:12

## 2017-02-02 LAB
BUN SERPL-MCNC: 27 MG/DL — HIGH (ref 7–23)
CALCIUM SERPL-MCNC: 8.1 MG/DL — LOW (ref 8.4–10.5)
CHLORIDE SERPL-SCNC: 99 MMOL/L — SIGNIFICANT CHANGE UP (ref 98–107)
CO2 SERPL-SCNC: 22 MMOL/L — SIGNIFICANT CHANGE UP (ref 22–31)
CREAT SERPL-MCNC: 1.43 MG/DL — HIGH (ref 0.5–1.3)
GLUCOSE SERPL-MCNC: 116 MG/DL — HIGH (ref 70–99)
POTASSIUM SERPL-MCNC: 4.2 MMOL/L — SIGNIFICANT CHANGE UP (ref 3.5–5.3)
POTASSIUM SERPL-SCNC: 4.2 MMOL/L — SIGNIFICANT CHANGE UP (ref 3.5–5.3)
SODIUM SERPL-SCNC: 132 MMOL/L — LOW (ref 135–145)

## 2017-02-02 PROCEDURE — 99232 SBSQ HOSP IP/OBS MODERATE 35: CPT | Mod: GC

## 2017-02-02 PROCEDURE — 99232 SBSQ HOSP IP/OBS MODERATE 35: CPT

## 2017-02-02 RX ADMIN — Medication 2: at 12:36

## 2017-02-02 RX ADMIN — HEPARIN SODIUM 5000 UNIT(S): 5000 INJECTION INTRAVENOUS; SUBCUTANEOUS at 23:08

## 2017-02-02 RX ADMIN — HEPARIN SODIUM 5000 UNIT(S): 5000 INJECTION INTRAVENOUS; SUBCUTANEOUS at 06:12

## 2017-02-02 RX ADMIN — TIGECYCLINE 105 MILLIGRAM(S): 50 INJECTION, POWDER, LYOPHILIZED, FOR SOLUTION INTRAVENOUS at 06:12

## 2017-02-02 RX ADMIN — TIGECYCLINE 105 MILLIGRAM(S): 50 INJECTION, POWDER, LYOPHILIZED, FOR SOLUTION INTRAVENOUS at 17:23

## 2017-02-02 RX ADMIN — AMLODIPINE BESYLATE 5 MILLIGRAM(S): 2.5 TABLET ORAL at 06:12

## 2017-02-02 RX ADMIN — Medication 100 MILLIGRAM(S): at 06:12

## 2017-02-02 RX ADMIN — QUETIAPINE FUMARATE 200 MILLIGRAM(S): 200 TABLET, FILM COATED ORAL at 23:07

## 2017-02-02 RX ADMIN — Medication 1 APPLICATION(S): at 13:39

## 2017-02-02 RX ADMIN — HEPARIN SODIUM 5000 UNIT(S): 5000 INJECTION INTRAVENOUS; SUBCUTANEOUS at 13:39

## 2017-02-02 RX ADMIN — Medication 100 MILLIGRAM(S): at 23:07

## 2017-02-02 RX ADMIN — Medication 1 TABLET(S): at 13:39

## 2017-02-02 RX ADMIN — SENNA PLUS 2 TABLET(S): 8.6 TABLET ORAL at 23:07

## 2017-02-02 RX ADMIN — ATORVASTATIN CALCIUM 40 MILLIGRAM(S): 80 TABLET, FILM COATED ORAL at 23:07

## 2017-02-02 RX ADMIN — FLUCONAZOLE 200 MILLIGRAM(S): 150 TABLET ORAL at 13:39

## 2017-02-03 LAB
BUN SERPL-MCNC: 32 MG/DL — HIGH (ref 7–23)
CALCIUM SERPL-MCNC: 8.1 MG/DL — LOW (ref 8.4–10.5)
CHLORIDE SERPL-SCNC: 102 MMOL/L — SIGNIFICANT CHANGE UP (ref 98–107)
CO2 SERPL-SCNC: 21 MMOL/L — LOW (ref 22–31)
CREAT SERPL-MCNC: 1.28 MG/DL — SIGNIFICANT CHANGE UP (ref 0.5–1.3)
GLUCOSE SERPL-MCNC: 116 MG/DL — HIGH (ref 70–99)
POTASSIUM SERPL-MCNC: 3.9 MMOL/L — SIGNIFICANT CHANGE UP (ref 3.5–5.3)
POTASSIUM SERPL-SCNC: 3.9 MMOL/L — SIGNIFICANT CHANGE UP (ref 3.5–5.3)
SODIUM SERPL-SCNC: 136 MMOL/L — SIGNIFICANT CHANGE UP (ref 135–145)

## 2017-02-03 PROCEDURE — 99233 SBSQ HOSP IP/OBS HIGH 50: CPT | Mod: GC

## 2017-02-03 RX ORDER — DOCUSATE SODIUM 100 MG
1 CAPSULE ORAL
Qty: 0 | Refills: 0 | COMMUNITY
Start: 2017-02-03

## 2017-02-03 RX ORDER — SENNA PLUS 8.6 MG/1
2 TABLET ORAL
Qty: 0 | Refills: 0 | COMMUNITY
Start: 2017-02-03

## 2017-02-03 RX ORDER — QUETIAPINE FUMARATE 200 MG/1
1 TABLET, FILM COATED ORAL
Qty: 0 | Refills: 0 | COMMUNITY
Start: 2017-02-03

## 2017-02-03 RX ORDER — QUETIAPINE FUMARATE 200 MG/1
1 TABLET, FILM COATED ORAL
Qty: 0 | Refills: 0 | COMMUNITY

## 2017-02-03 RX ORDER — TIGECYCLINE 50 MG/5ML
0 INJECTION, POWDER, LYOPHILIZED, FOR SOLUTION INTRAVENOUS
Qty: 0 | Refills: 0 | COMMUNITY
Start: 2017-02-03

## 2017-02-03 RX ADMIN — HEPARIN SODIUM 5000 UNIT(S): 5000 INJECTION INTRAVENOUS; SUBCUTANEOUS at 21:28

## 2017-02-03 RX ADMIN — Medication 100 MILLIGRAM(S): at 05:56

## 2017-02-03 RX ADMIN — HEPARIN SODIUM 5000 UNIT(S): 5000 INJECTION INTRAVENOUS; SUBCUTANEOUS at 05:56

## 2017-02-03 RX ADMIN — Medication 1 APPLICATION(S): at 14:02

## 2017-02-03 RX ADMIN — QUETIAPINE FUMARATE 200 MILLIGRAM(S): 200 TABLET, FILM COATED ORAL at 21:28

## 2017-02-03 RX ADMIN — TIGECYCLINE 105 MILLIGRAM(S): 50 INJECTION, POWDER, LYOPHILIZED, FOR SOLUTION INTRAVENOUS at 18:22

## 2017-02-03 RX ADMIN — AMLODIPINE BESYLATE 5 MILLIGRAM(S): 2.5 TABLET ORAL at 08:48

## 2017-02-03 RX ADMIN — ATORVASTATIN CALCIUM 40 MILLIGRAM(S): 80 TABLET, FILM COATED ORAL at 21:28

## 2017-02-03 RX ADMIN — TIGECYCLINE 105 MILLIGRAM(S): 50 INJECTION, POWDER, LYOPHILIZED, FOR SOLUTION INTRAVENOUS at 05:55

## 2017-02-03 RX ADMIN — FLUCONAZOLE 200 MILLIGRAM(S): 150 TABLET ORAL at 12:42

## 2017-02-03 RX ADMIN — Medication 100 MILLIGRAM(S): at 14:24

## 2017-02-03 RX ADMIN — HEPARIN SODIUM 5000 UNIT(S): 5000 INJECTION INTRAVENOUS; SUBCUTANEOUS at 14:24

## 2017-02-03 RX ADMIN — Medication 1 TABLET(S): at 12:42

## 2017-02-04 VITALS
OXYGEN SATURATION: 100 % | TEMPERATURE: 98 F | SYSTOLIC BLOOD PRESSURE: 125 MMHG | RESPIRATION RATE: 18 BRPM | HEART RATE: 91 BPM | DIASTOLIC BLOOD PRESSURE: 61 MMHG

## 2017-02-04 LAB
BUN SERPL-MCNC: 29 MG/DL — HIGH (ref 7–23)
CALCIUM SERPL-MCNC: 8.5 MG/DL — SIGNIFICANT CHANGE UP (ref 8.4–10.5)
CHLORIDE SERPL-SCNC: 99 MMOL/L — SIGNIFICANT CHANGE UP (ref 98–107)
CO2 SERPL-SCNC: 21 MMOL/L — LOW (ref 22–31)
CREAT SERPL-MCNC: 1.26 MG/DL — SIGNIFICANT CHANGE UP (ref 0.5–1.3)
GLUCOSE SERPL-MCNC: 115 MG/DL — HIGH (ref 70–99)
POTASSIUM SERPL-MCNC: 4 MMOL/L — SIGNIFICANT CHANGE UP (ref 3.5–5.3)
POTASSIUM SERPL-SCNC: 4 MMOL/L — SIGNIFICANT CHANGE UP (ref 3.5–5.3)
SODIUM SERPL-SCNC: 136 MMOL/L — SIGNIFICANT CHANGE UP (ref 135–145)

## 2017-02-04 PROCEDURE — 99239 HOSP IP/OBS DSCHRG MGMT >30: CPT

## 2017-02-04 RX ORDER — HEPARIN SODIUM 5000 [USP'U]/ML
5000 INJECTION INTRAVENOUS; SUBCUTANEOUS
Qty: 0 | Refills: 0 | COMMUNITY
Start: 2017-02-04

## 2017-02-04 RX ORDER — HEPARIN SODIUM 5000 [USP'U]/ML
0 INJECTION INTRAVENOUS; SUBCUTANEOUS
Qty: 0 | Refills: 0 | COMMUNITY
Start: 2017-02-04

## 2017-02-04 RX ORDER — INSULIN LISPRO 100/ML
0 VIAL (ML) SUBCUTANEOUS
Qty: 0 | Refills: 0 | COMMUNITY
Start: 2017-02-04

## 2017-02-04 RX ORDER — METFORMIN HYDROCHLORIDE 850 MG/1
1 TABLET ORAL
Qty: 28 | Refills: 0 | OUTPATIENT
Start: 2017-02-04 | End: 2017-02-18

## 2017-02-04 RX ADMIN — Medication 1 TABLET(S): at 12:25

## 2017-02-04 RX ADMIN — HEPARIN SODIUM 5000 UNIT(S): 5000 INJECTION INTRAVENOUS; SUBCUTANEOUS at 14:43

## 2017-02-04 RX ADMIN — TIGECYCLINE 105 MILLIGRAM(S): 50 INJECTION, POWDER, LYOPHILIZED, FOR SOLUTION INTRAVENOUS at 06:36

## 2017-02-04 RX ADMIN — TIGECYCLINE 105 MILLIGRAM(S): 50 INJECTION, POWDER, LYOPHILIZED, FOR SOLUTION INTRAVENOUS at 17:09

## 2017-02-04 RX ADMIN — AMLODIPINE BESYLATE 5 MILLIGRAM(S): 2.5 TABLET ORAL at 06:36

## 2017-02-04 RX ADMIN — HEPARIN SODIUM 5000 UNIT(S): 5000 INJECTION INTRAVENOUS; SUBCUTANEOUS at 06:36

## 2017-02-04 RX ADMIN — Medication 2: at 17:09

## 2017-02-04 RX ADMIN — FLUCONAZOLE 200 MILLIGRAM(S): 150 TABLET ORAL at 12:25

## 2017-02-04 RX ADMIN — Medication 2: at 12:24

## 2017-02-23 PROBLEM — E11.9 TYPE 2 DIABETES MELLITUS WITHOUT COMPLICATIONS: Chronic | Status: ACTIVE | Noted: 2017-01-11

## 2017-02-23 PROBLEM — I10 ESSENTIAL (PRIMARY) HYPERTENSION: Chronic | Status: ACTIVE | Noted: 2017-01-11

## 2017-02-23 PROBLEM — F03.90 UNSPECIFIED DEMENTIA, UNSPECIFIED SEVERITY, WITHOUT BEHAVIORAL DISTURBANCE, PSYCHOTIC DISTURBANCE, MOOD DISTURBANCE, AND ANXIETY: Chronic | Status: ACTIVE | Noted: 2017-01-11

## 2017-03-21 ENCOUNTER — APPOINTMENT (OUTPATIENT)
Dept: INFECTIOUS DISEASE | Facility: CLINIC | Age: 77
End: 2017-03-21

## 2017-03-21 RX ORDER — METRONIDAZOLE 500 MG
1 TABLET ORAL
Qty: 0 | Refills: 0 | COMMUNITY
Start: 2017-03-21

## 2017-03-22 ENCOUNTER — INPATIENT (INPATIENT)
Facility: HOSPITAL | Age: 77
LOS: 6 days | Discharge: NURSING, REHAB OR LONG-TERM CARE | End: 2017-03-29
Attending: INTERNAL MEDICINE | Admitting: INTERNAL MEDICINE
Payer: MEDICARE

## 2017-03-22 VITALS
TEMPERATURE: 99 F | HEART RATE: 107 BPM | DIASTOLIC BLOOD PRESSURE: 61 MMHG | OXYGEN SATURATION: 98 % | SYSTOLIC BLOOD PRESSURE: 109 MMHG | RESPIRATION RATE: 98 BRPM

## 2017-03-22 DIAGNOSIS — Z98.891 HISTORY OF UTERINE SCAR FROM PREVIOUS SURGERY: Chronic | ICD-10-CM

## 2017-03-22 DIAGNOSIS — A04.7 ENTEROCOLITIS DUE TO CLOSTRIDIUM DIFFICILE: ICD-10-CM

## 2017-03-22 LAB
ALBUMIN SERPL ELPH-MCNC: 1.7 G/DL — LOW (ref 3.3–5)
ALP SERPL-CCNC: 107 U/L — SIGNIFICANT CHANGE UP (ref 40–120)
ALT FLD-CCNC: 13 U/L — SIGNIFICANT CHANGE UP (ref 4–33)
ANISOCYTOSIS BLD QL: SIGNIFICANT CHANGE UP
APPEARANCE UR: CLEAR — SIGNIFICANT CHANGE UP
APTT BLD: 32.5 SEC — SIGNIFICANT CHANGE UP (ref 27.5–37.4)
AST SERPL-CCNC: 26 U/L — SIGNIFICANT CHANGE UP (ref 4–32)
BACTERIA # UR AUTO: SIGNIFICANT CHANGE UP
BASE EXCESS BLDV CALC-SCNC: 0.9 MMOL/L — SIGNIFICANT CHANGE UP
BASE EXCESS BLDV CALC-SCNC: 1.6 MMOL/L — SIGNIFICANT CHANGE UP
BASOPHILS # BLD AUTO: 0.03 K/UL — SIGNIFICANT CHANGE UP (ref 0–0.2)
BASOPHILS NFR BLD AUTO: 0.2 % — SIGNIFICANT CHANGE UP (ref 0–2)
BASOPHILS NFR SPEC: 0 % — SIGNIFICANT CHANGE UP (ref 0–2)
BILIRUB SERPL-MCNC: 0.2 MG/DL — SIGNIFICANT CHANGE UP (ref 0.2–1.2)
BILIRUB UR-MCNC: NEGATIVE — SIGNIFICANT CHANGE UP
BLD GP AB SCN SERPL QL: NEGATIVE — SIGNIFICANT CHANGE UP
BLOOD GAS VENOUS - CREATININE: 0.57 MG/DL — SIGNIFICANT CHANGE UP (ref 0.5–1.3)
BLOOD GAS VENOUS - CREATININE: 0.64 MG/DL — SIGNIFICANT CHANGE UP (ref 0.5–1.3)
BLOOD UR QL VISUAL: NEGATIVE — SIGNIFICANT CHANGE UP
BUN SERPL-MCNC: 21 MG/DL — SIGNIFICANT CHANGE UP (ref 7–23)
BURR CELLS BLD QL SMEAR: PRESENT — SIGNIFICANT CHANGE UP
CALCIUM SERPL-MCNC: 7.8 MG/DL — LOW (ref 8.4–10.5)
CHLORIDE BLDV-SCNC: 99 MMOL/L — SIGNIFICANT CHANGE UP (ref 96–108)
CHLORIDE BLDV-SCNC: 99 MMOL/L — SIGNIFICANT CHANGE UP (ref 96–108)
CHLORIDE SERPL-SCNC: 93 MMOL/L — LOW (ref 98–107)
CO2 SERPL-SCNC: 22 MMOL/L — SIGNIFICANT CHANGE UP (ref 22–31)
COLOR SPEC: YELLOW — SIGNIFICANT CHANGE UP
CREAT SERPL-MCNC: 0.67 MG/DL — SIGNIFICANT CHANGE UP (ref 0.5–1.3)
EOSINOPHIL # BLD AUTO: 0.09 K/UL — SIGNIFICANT CHANGE UP (ref 0–0.5)
EOSINOPHIL NFR BLD AUTO: 0.5 % — SIGNIFICANT CHANGE UP (ref 0–6)
EOSINOPHIL NFR FLD: 0 % — SIGNIFICANT CHANGE UP (ref 0–6)
GAS PNL BLDV: 122 MMOL/L — LOW (ref 136–146)
GAS PNL BLDV: 126 MMOL/L — LOW (ref 136–146)
GIANT PLATELETS BLD QL SMEAR: PRESENT — SIGNIFICANT CHANGE UP
GLUCOSE BLDV-MCNC: 151 — HIGH (ref 70–99)
GLUCOSE BLDV-MCNC: 192 — HIGH (ref 70–99)
GLUCOSE SERPL-MCNC: 197 MG/DL — HIGH (ref 70–99)
GLUCOSE UR-MCNC: NEGATIVE — SIGNIFICANT CHANGE UP
HCO3 BLDV-SCNC: 25 MMOL/L — SIGNIFICANT CHANGE UP (ref 20–27)
HCO3 BLDV-SCNC: 26 MMOL/L — SIGNIFICANT CHANGE UP (ref 20–27)
HCT VFR BLD CALC: 24 % — LOW (ref 34.5–45)
HCT VFR BLDV CALC: 25.5 % — LOW (ref 34.5–45)
HCT VFR BLDV CALC: 27.1 % — LOW (ref 34.5–45)
HGB BLD-MCNC: 8.4 G/DL — LOW (ref 11.5–15.5)
HGB BLDV-MCNC: 8.2 G/DL — LOW (ref 11.5–15.5)
HGB BLDV-MCNC: 8.7 G/DL — LOW (ref 11.5–15.5)
HYALINE CASTS # UR AUTO: SIGNIFICANT CHANGE UP (ref 0–?)
HYPOCHROMIA BLD QL: SIGNIFICANT CHANGE UP
IMM GRANULOCYTES NFR BLD AUTO: 0.4 % — SIGNIFICANT CHANGE UP (ref 0–1.5)
INR BLD: 1.75 — HIGH (ref 0.88–1.17)
KETONES UR-MCNC: NEGATIVE — SIGNIFICANT CHANGE UP
LACTATE BLDV-MCNC: 1.6 MMOL/L — SIGNIFICANT CHANGE UP (ref 0.5–2)
LACTATE BLDV-MCNC: 2.7 MMOL/L — HIGH (ref 0.5–2)
LEUKOCYTE ESTERASE UR-ACNC: HIGH
LYMPHOCYTES # BLD AUTO: 17.1 % — SIGNIFICANT CHANGE UP (ref 13–44)
LYMPHOCYTES # BLD AUTO: 3.35 K/UL — HIGH (ref 1–3.3)
LYMPHOCYTES NFR SPEC AUTO: 7.6 % — LOW (ref 13–44)
MACROCYTES BLD QL: SLIGHT — SIGNIFICANT CHANGE UP
MCHC RBC-ENTMCNC: 27.9 PG — SIGNIFICANT CHANGE UP (ref 27–34)
MCHC RBC-ENTMCNC: 35 % — SIGNIFICANT CHANGE UP (ref 32–36)
MCV RBC AUTO: 79.7 FL — LOW (ref 80–100)
MONOCYTES # BLD AUTO: 0.74 K/UL — SIGNIFICANT CHANGE UP (ref 0–0.9)
MONOCYTES NFR BLD AUTO: 3.8 % — SIGNIFICANT CHANGE UP (ref 2–14)
MONOCYTES NFR BLD: 2.5 % — SIGNIFICANT CHANGE UP (ref 2–9)
MUCOUS THREADS # UR AUTO: SIGNIFICANT CHANGE UP
NEUTROPHIL AB SER-ACNC: 89.9 % — HIGH (ref 43–77)
NEUTROPHILS # BLD AUTO: 15.32 K/UL — HIGH (ref 1.8–7.4)
NEUTROPHILS NFR BLD AUTO: 78 % — HIGH (ref 43–77)
NITRITE UR-MCNC: POSITIVE — HIGH
NON-SQ EPI CELLS # UR AUTO: <1 — SIGNIFICANT CHANGE UP
OB PNL STL: POSITIVE — SIGNIFICANT CHANGE UP
OVALOCYTES BLD QL SMEAR: SLIGHT — SIGNIFICANT CHANGE UP
PCO2 BLDV: 37 MMHG — LOW (ref 41–51)
PCO2 BLDV: 41 MMHG — SIGNIFICANT CHANGE UP (ref 41–51)
PH BLDV: 7.4 PH — SIGNIFICANT CHANGE UP (ref 7.32–7.43)
PH BLDV: 7.45 PH — HIGH (ref 7.32–7.43)
PH UR: 6.5 — SIGNIFICANT CHANGE UP (ref 4.6–8)
PLATELET # BLD AUTO: 781 K/UL — HIGH (ref 150–400)
PLATELET COUNT - ESTIMATE: SIGNIFICANT CHANGE UP
PMV BLD: 8.8 FL — SIGNIFICANT CHANGE UP (ref 7–13)
PO2 BLDV: 35 MMHG — SIGNIFICANT CHANGE UP (ref 35–40)
PO2 BLDV: 46 MMHG — HIGH (ref 35–40)
POIKILOCYTOSIS BLD QL AUTO: SIGNIFICANT CHANGE UP
POLYCHROMASIA BLD QL SMEAR: SLIGHT — SIGNIFICANT CHANGE UP
POTASSIUM BLDV-SCNC: 3.7 MMOL/L — SIGNIFICANT CHANGE UP (ref 3.4–4.5)
POTASSIUM BLDV-SCNC: 3.8 MMOL/L — SIGNIFICANT CHANGE UP (ref 3.4–4.5)
POTASSIUM SERPL-MCNC: 4.1 MMOL/L — SIGNIFICANT CHANGE UP (ref 3.5–5.3)
POTASSIUM SERPL-SCNC: 4.1 MMOL/L — SIGNIFICANT CHANGE UP (ref 3.5–5.3)
PROT SERPL-MCNC: 6 G/DL — SIGNIFICANT CHANGE UP (ref 6–8.3)
PROT UR-MCNC: 30 — HIGH
PROTHROM AB SERPL-ACNC: 19.8 SEC — HIGH (ref 9.8–13.1)
RBC # BLD: 3.01 M/UL — LOW (ref 3.8–5.2)
RBC # FLD: 14.4 % — SIGNIFICANT CHANGE UP (ref 10.3–14.5)
RBC CASTS # UR COMP ASSIST: SIGNIFICANT CHANGE UP (ref 0–?)
RH IG SCN BLD-IMP: POSITIVE — SIGNIFICANT CHANGE UP
SAO2 % BLDV: 48.5 % — LOW (ref 60–85)
SAO2 % BLDV: 74 % — SIGNIFICANT CHANGE UP (ref 60–85)
SODIUM SERPL-SCNC: 129 MMOL/L — LOW (ref 135–145)
SP GR SPEC: 1.02 — SIGNIFICANT CHANGE UP (ref 1–1.03)
TARGETS BLD QL SMEAR: SLIGHT — SIGNIFICANT CHANGE UP
UROBILINOGEN FLD QL: NORMAL E.U. — SIGNIFICANT CHANGE UP (ref 0.1–0.2)
WBC # BLD: 19.61 K/UL — HIGH (ref 3.8–10.5)
WBC # FLD AUTO: 19.61 K/UL — HIGH (ref 3.8–10.5)
WBC UR QL: SIGNIFICANT CHANGE UP (ref 0–?)

## 2017-03-22 PROCEDURE — 74177 CT ABD & PELVIS W/CONTRAST: CPT | Mod: 26

## 2017-03-22 RX ORDER — SODIUM CHLORIDE 9 MG/ML
2000 INJECTION INTRAMUSCULAR; INTRAVENOUS; SUBCUTANEOUS ONCE
Qty: 0 | Refills: 0 | Status: COMPLETED | OUTPATIENT
Start: 2017-03-22 | End: 2017-03-22

## 2017-03-22 RX ORDER — VANCOMYCIN HCL 1 G
500 VIAL (EA) INTRAVENOUS ONCE
Qty: 0 | Refills: 0 | Status: COMPLETED | OUTPATIENT
Start: 2017-03-22 | End: 2017-03-22

## 2017-03-22 RX ORDER — METRONIDAZOLE 500 MG
500 TABLET ORAL ONCE
Qty: 0 | Refills: 0 | Status: COMPLETED | OUTPATIENT
Start: 2017-03-22 | End: 2017-03-22

## 2017-03-22 RX ADMIN — Medication 100 MILLIGRAM(S): at 19:09

## 2017-03-22 RX ADMIN — Medication 500 MILLIGRAM(S): at 19:30

## 2017-03-22 RX ADMIN — SODIUM CHLORIDE 1000 MILLILITER(S): 9 INJECTION INTRAMUSCULAR; INTRAVENOUS; SUBCUTANEOUS at 18:58

## 2017-03-22 NOTE — ED ADULT NURSE NOTE - CHIEF COMPLAINT QUOTE
Arrives from [facility] home.  arrives for eval of rectal bleeding yesterday x 1 and this afternoon x 1.  Denies blood thinner use.   daughter states pt has sacral wound w wound vac in place.

## 2017-03-22 NOTE — ED PROVIDER NOTE - OBJECTIVE STATEMENT
Pt is a 77 y/o F w/ a PMHx of HTN and recent admission in Jan 2017 for sepsis 2/2 Stage 4 sacral decub w/ abscess who presents from NH w/ BRBPR x2 (once yesterday and once today). Pt daughter also states that she has not been herself lately. Pt denies abdominal pain, nausea, vomiting (as does daughter and no note from NH about other infectious symptoms such as cough, nasal congestion, dysuria, urinary frequency, new rashes or injuries, headaches, neck stiffness, photophobia, and sick contacts. +recent extensive antibiotics use and IV vanco use last week for sacral wound. According to Daughter sacral wound with no new changes or bleeding and currently w/ wound vac.     Patient was tested for C. diff at NH due to diarrhea w/ blood. Test was positive and given PO flagyl yesterday w/ no improvement.

## 2017-03-22 NOTE — ED ADULT TRIAGE NOTE - CHIEF COMPLAINT QUOTE
Arrives from [facility] home.  arrives for eval of rectal bleeding yesterday x 1 and this afternoon x 1.  Denies blood thinner use.   daughter states pt has sacral wound w wound vac in place.

## 2017-03-22 NOTE — ED PROVIDER NOTE - ATTENDING CONTRIBUTION TO CARE
Attending note:   After face to face evaluation of this patient, I concur with above noted hx, pe, and care plan for this patient. +Cdiff with bloody stool in this nursing home patient on chronic abx.  Trace r lower abd pain.   Evaluation in progress

## 2017-03-22 NOTE — ED PROVIDER NOTE - RECTAL
HEMORRHOIDS/bright red blood (about 1 cup) when finger was withdrawn from rectum. No fistula could be appreciated on exam.

## 2017-03-22 NOTE — ED PROVIDER NOTE - CHIEF COMPLAINT
The patient is a 76y Female complaining of The patient is a 76y Female complaining of bright red blood per rectum

## 2017-03-22 NOTE — ED ADULT NURSE NOTE - OBJECTIVE STATEMENT
Pt presents to ED R#20 AOx4, in NAD, sent in from NH for blood in stool x1 day. Pt in NH s/p hospital admission for sepsis and treatment of sacral pressure ulcer, tested positive 2 days ago for CDIF in stool. Upon arrival to ED room, pt appears awake, alert, Ox4, in NAD, denies any abdominal pain/ weakness/ palpitations/ CP/ SOB/ N/V/ urinary symptoms/ other acute medical complaints. Stool noted to be bloody uponm Pt presents to ED R#20 AOx4, in NAD, sent in from NH for blood in stool x1 day. Pt in NH s/p hospital admission for sepsis and treatment of sacral pressure ulcer, tested positive 2 days ago for CDIF in stool. Upon arrival to ED room, pt appears awake, alert, Ox4, in NAD, denies any abdominal pain/ weakness/ palpitations/ CP/ SOB/ N/V/ urinary symptoms/ other acute medical complaints. Stool noted to be loose and bloody upon cleaning pt, wound vac in place with tubing clamped, multiple other 1x1 cm stage 2 ulcers noted to sacral area. Pt presents to ED R#20 AOx4, in NAD, sent in from NH for blood in stool x1 day. Pt in NH s/p hospital admission for sepsis and treatment of sacral pressure ulcer, tested positive 2 days ago for CDIF in stool. Upon arrival to ED room, pt appears awake, alert, Ox4, in NAD. Daughter reports some increased weakness with dysarthria x1 month. Denies any abdominal pain/ weakness/ palpitations/ CP/ SOB/ N/V/ urinary symptoms/ other acute medical complaints. Stool noted to be loose and bloody upon cleaning pt, wound vac in place with tubing clamped, multiple 1x1 cm stage 2 ulcers noted to sacral area, with 2cm x2cm stage 2 on L ankle with dressing in place. Howell cath in place upon arrival. VSS, IV inserted, BW and urine sample collected and sent to lab. Seen by MD. Contact precautions in place. Awaiting test results. Will continue to monitor.

## 2017-03-22 NOTE — ED PROVIDER NOTE - MEDICAL DECISION MAKING DETAILS
Pt 76 w/ DM and sacral stage 4 ulcer w/ extensive Abx use w/ likely severe sepsis 2/2 C. diff colitis vs sacral ulcer infection. Given severity of likely C. diff will hold on other Abx but give PO vanco and IV flagyl. Will order CBC, CMP, VBG w/ lactate, PT/INR, aPTT, Type and screen, UA, Urine culture, give IVFs, and order CT A/P. Will need admission.

## 2017-03-23 ENCOUNTER — RESULT REVIEW (OUTPATIENT)
Age: 77
End: 2017-03-23

## 2017-03-23 DIAGNOSIS — Z41.8 ENCOUNTER FOR OTHER PROCEDURES FOR PURPOSES OTHER THAN REMEDYING HEALTH STATE: ICD-10-CM

## 2017-03-23 DIAGNOSIS — E87.1 HYPO-OSMOLALITY AND HYPONATREMIA: ICD-10-CM

## 2017-03-23 DIAGNOSIS — D50.9 IRON DEFICIENCY ANEMIA, UNSPECIFIED: ICD-10-CM

## 2017-03-23 DIAGNOSIS — E46 UNSPECIFIED PROTEIN-CALORIE MALNUTRITION: ICD-10-CM

## 2017-03-23 DIAGNOSIS — I10 ESSENTIAL (PRIMARY) HYPERTENSION: ICD-10-CM

## 2017-03-23 DIAGNOSIS — R82.90 UNSPECIFIED ABNORMAL FINDINGS IN URINE: ICD-10-CM

## 2017-03-23 DIAGNOSIS — A41.9 SEPSIS, UNSPECIFIED ORGANISM: ICD-10-CM

## 2017-03-23 DIAGNOSIS — E11.9 TYPE 2 DIABETES MELLITUS WITHOUT COMPLICATIONS: ICD-10-CM

## 2017-03-23 DIAGNOSIS — L89.154 PRESSURE ULCER OF SACRAL REGION, STAGE 4: ICD-10-CM

## 2017-03-23 DIAGNOSIS — A04.7 ENTEROCOLITIS DUE TO CLOSTRIDIUM DIFFICILE: ICD-10-CM

## 2017-03-23 LAB
ALBUMIN SERPL ELPH-MCNC: 1.3 G/DL — LOW (ref 3.3–5)
ALP SERPL-CCNC: 84 U/L — SIGNIFICANT CHANGE UP (ref 40–120)
ALT FLD-CCNC: 12 U/L — SIGNIFICANT CHANGE UP (ref 4–33)
AST SERPL-CCNC: 15 U/L — SIGNIFICANT CHANGE UP (ref 4–32)
BASOPHILS # BLD AUTO: 0.04 K/UL — SIGNIFICANT CHANGE UP (ref 0–0.2)
BASOPHILS NFR BLD AUTO: 0.2 % — SIGNIFICANT CHANGE UP (ref 0–2)
BILIRUB SERPL-MCNC: 0.2 MG/DL — SIGNIFICANT CHANGE UP (ref 0.2–1.2)
BUN SERPL-MCNC: 9 MG/DL — SIGNIFICANT CHANGE UP (ref 7–23)
C DIFF TOX GENS STL QL NAA+PROBE: DETECTED — HIGH
CALCIUM SERPL-MCNC: 7 MG/DL — LOW (ref 8.4–10.5)
CHLORIDE SERPL-SCNC: 99 MMOL/L — SIGNIFICANT CHANGE UP (ref 98–107)
CO2 SERPL-SCNC: 19 MMOL/L — LOW (ref 22–31)
CREAT SERPL-MCNC: 0.43 MG/DL — LOW (ref 0.5–1.3)
EOSINOPHIL # BLD AUTO: 0.17 K/UL — SIGNIFICANT CHANGE UP (ref 0–0.5)
EOSINOPHIL NFR BLD AUTO: 0.9 % — SIGNIFICANT CHANGE UP (ref 0–6)
GLUCOSE SERPL-MCNC: 101 MG/DL — HIGH (ref 70–99)
HCT VFR BLD CALC: 19.6 % — CRITICAL LOW (ref 34.5–45)
HCT VFR BLD CALC: 37.9 % — SIGNIFICANT CHANGE UP (ref 34.5–45)
HGB BLD-MCNC: 12.6 G/DL — SIGNIFICANT CHANGE UP (ref 11.5–15.5)
HGB BLD-MCNC: 6.7 G/DL — CRITICAL LOW (ref 11.5–15.5)
IMM GRANULOCYTES NFR BLD AUTO: 0.4 % — SIGNIFICANT CHANGE UP (ref 0–1.5)
LYMPHOCYTES # BLD AUTO: 17.6 % — SIGNIFICANT CHANGE UP (ref 13–44)
LYMPHOCYTES # BLD AUTO: 3.15 K/UL — SIGNIFICANT CHANGE UP (ref 1–3.3)
MAGNESIUM SERPL-MCNC: 1.6 MG/DL — SIGNIFICANT CHANGE UP (ref 1.6–2.6)
MCHC RBC-ENTMCNC: 27.2 PG — SIGNIFICANT CHANGE UP (ref 27–34)
MCHC RBC-ENTMCNC: 30.1 PG — SIGNIFICANT CHANGE UP (ref 27–34)
MCHC RBC-ENTMCNC: 33.2 % — SIGNIFICANT CHANGE UP (ref 32–36)
MCHC RBC-ENTMCNC: 34.2 % — SIGNIFICANT CHANGE UP (ref 32–36)
MCV RBC AUTO: 79.7 FL — LOW (ref 80–100)
MCV RBC AUTO: 90.5 FL — SIGNIFICANT CHANGE UP (ref 80–100)
MONOCYTES # BLD AUTO: 0.75 K/UL — SIGNIFICANT CHANGE UP (ref 0–0.9)
MONOCYTES NFR BLD AUTO: 4.2 % — SIGNIFICANT CHANGE UP (ref 2–14)
NEUTROPHILS # BLD AUTO: 13.73 K/UL — HIGH (ref 1.8–7.4)
NEUTROPHILS NFR BLD AUTO: 76.7 % — SIGNIFICANT CHANGE UP (ref 43–77)
PHOSPHATE SERPL-MCNC: 5 MG/DL — HIGH (ref 2.5–4.5)
PLATELET # BLD AUTO: 218 K/UL — SIGNIFICANT CHANGE UP (ref 150–400)
PLATELET # BLD AUTO: 638 K/UL — HIGH (ref 150–400)
PMV BLD: 10.4 FL — SIGNIFICANT CHANGE UP (ref 7–13)
PMV BLD: 8.7 FL — SIGNIFICANT CHANGE UP (ref 7–13)
POTASSIUM SERPL-MCNC: 3.1 MMOL/L — LOW (ref 3.5–5.3)
POTASSIUM SERPL-SCNC: 3.1 MMOL/L — LOW (ref 3.5–5.3)
PROT SERPL-MCNC: 5 G/DL — LOW (ref 6–8.3)
RBC # BLD: 2.46 M/UL — LOW (ref 3.8–5.2)
RBC # BLD: 4.19 M/UL — SIGNIFICANT CHANGE UP (ref 3.8–5.2)
RBC # FLD: 13.9 % — SIGNIFICANT CHANGE UP (ref 10.3–14.5)
RBC # FLD: 14.7 % — HIGH (ref 10.3–14.5)
RH IG SCN BLD-IMP: POSITIVE — SIGNIFICANT CHANGE UP
SODIUM SERPL-SCNC: 133 MMOL/L — LOW (ref 135–145)
SPECIMEN SOURCE: SIGNIFICANT CHANGE UP
SPECIMEN SOURCE: SIGNIFICANT CHANGE UP
WBC # BLD: 17.91 K/UL — HIGH (ref 3.8–10.5)
WBC # BLD: 8.81 K/UL — SIGNIFICANT CHANGE UP (ref 3.8–10.5)
WBC # FLD AUTO: 17.91 K/UL — HIGH (ref 3.8–10.5)
WBC # FLD AUTO: 8.81 K/UL — SIGNIFICANT CHANGE UP (ref 3.8–10.5)

## 2017-03-23 PROCEDURE — 99223 1ST HOSP IP/OBS HIGH 75: CPT | Mod: GC

## 2017-03-23 PROCEDURE — 93010 ELECTROCARDIOGRAM REPORT: CPT

## 2017-03-23 PROCEDURE — 99222 1ST HOSP IP/OBS MODERATE 55: CPT | Mod: GC

## 2017-03-23 PROCEDURE — 99222 1ST HOSP IP/OBS MODERATE 55: CPT

## 2017-03-23 RX ORDER — DEXTROSE 50 % IN WATER 50 %
1 SYRINGE (ML) INTRAVENOUS ONCE
Qty: 0 | Refills: 0 | Status: DISCONTINUED | OUTPATIENT
Start: 2017-03-23 | End: 2017-03-29

## 2017-03-23 RX ORDER — SOD SULF/SODIUM/NAHCO3/KCL/PEG
1000 SOLUTION, RECONSTITUTED, ORAL ORAL EVERY 4 HOURS
Qty: 0 | Refills: 0 | Status: COMPLETED | OUTPATIENT
Start: 2017-03-23 | End: 2017-03-23

## 2017-03-23 RX ORDER — ASCORBIC ACID 60 MG
500 TABLET,CHEWABLE ORAL DAILY
Qty: 0 | Refills: 0 | Status: DISCONTINUED | OUTPATIENT
Start: 2017-03-23 | End: 2017-03-29

## 2017-03-23 RX ORDER — ACETAMINOPHEN 500 MG
650 TABLET ORAL EVERY 6 HOURS
Qty: 0 | Refills: 0 | Status: DISCONTINUED | OUTPATIENT
Start: 2017-03-23 | End: 2017-03-29

## 2017-03-23 RX ORDER — FOLIC ACID 0.8 MG
1 TABLET ORAL DAILY
Qty: 0 | Refills: 0 | Status: DISCONTINUED | OUTPATIENT
Start: 2017-03-23 | End: 2017-03-29

## 2017-03-23 RX ORDER — SODIUM CHLORIDE 9 MG/ML
1000 INJECTION INTRAMUSCULAR; INTRAVENOUS; SUBCUTANEOUS
Qty: 0 | Refills: 0 | Status: DISCONTINUED | OUTPATIENT
Start: 2017-03-23 | End: 2017-03-29

## 2017-03-23 RX ORDER — FOLIC ACID 0.8 MG
1 TABLET ORAL
Qty: 0 | Refills: 0 | COMMUNITY

## 2017-03-23 RX ORDER — FERROUS SULFATE 325(65) MG
325 TABLET ORAL DAILY
Qty: 0 | Refills: 0 | Status: DISCONTINUED | OUTPATIENT
Start: 2017-03-23 | End: 2017-03-29

## 2017-03-23 RX ORDER — ATORVASTATIN CALCIUM 80 MG/1
40 TABLET, FILM COATED ORAL AT BEDTIME
Qty: 0 | Refills: 0 | Status: DISCONTINUED | OUTPATIENT
Start: 2017-03-23 | End: 2017-03-29

## 2017-03-23 RX ORDER — INSULIN LISPRO 100/ML
VIAL (ML) SUBCUTANEOUS
Qty: 0 | Refills: 0 | Status: DISCONTINUED | OUTPATIENT
Start: 2017-03-23 | End: 2017-03-29

## 2017-03-23 RX ORDER — DEXTROSE 50 % IN WATER 50 %
25 SYRINGE (ML) INTRAVENOUS ONCE
Qty: 0 | Refills: 0 | Status: DISCONTINUED | OUTPATIENT
Start: 2017-03-23 | End: 2017-03-29

## 2017-03-23 RX ORDER — AMLODIPINE BESYLATE 2.5 MG/1
5 TABLET ORAL DAILY
Qty: 0 | Refills: 0 | Status: DISCONTINUED | OUTPATIENT
Start: 2017-03-23 | End: 2017-03-23

## 2017-03-23 RX ORDER — LACTOBACILLUS ACIDOPHILUS 100MM CELL
1 CAPSULE ORAL
Qty: 0 | Refills: 0 | Status: DISCONTINUED | OUTPATIENT
Start: 2017-03-23 | End: 2017-03-29

## 2017-03-23 RX ORDER — POTASSIUM CHLORIDE 20 MEQ
40 PACKET (EA) ORAL EVERY 4 HOURS
Qty: 0 | Refills: 0 | Status: COMPLETED | OUTPATIENT
Start: 2017-03-23 | End: 2017-03-23

## 2017-03-23 RX ORDER — MAGNESIUM SULFATE 500 MG/ML
2 VIAL (ML) INJECTION ONCE
Qty: 0 | Refills: 0 | Status: COMPLETED | OUTPATIENT
Start: 2017-03-23 | End: 2017-03-23

## 2017-03-23 RX ORDER — DEXTROSE 50 % IN WATER 50 %
12.5 SYRINGE (ML) INTRAVENOUS ONCE
Qty: 0 | Refills: 0 | Status: DISCONTINUED | OUTPATIENT
Start: 2017-03-23 | End: 2017-03-29

## 2017-03-23 RX ORDER — METRONIDAZOLE 500 MG
500 TABLET ORAL EVERY 8 HOURS
Qty: 0 | Refills: 0 | Status: DISCONTINUED | OUTPATIENT
Start: 2017-03-23 | End: 2017-03-23

## 2017-03-23 RX ORDER — CIPROFLOXACIN LACTATE 400MG/40ML
200 VIAL (ML) INTRAVENOUS EVERY 12 HOURS
Qty: 0 | Refills: 0 | Status: DISCONTINUED | OUTPATIENT
Start: 2017-03-23 | End: 2017-03-25

## 2017-03-23 RX ORDER — FERROUS SULFATE 325(65) MG
1 TABLET ORAL
Qty: 0 | Refills: 0 | COMMUNITY

## 2017-03-23 RX ORDER — SODIUM CHLORIDE 9 MG/ML
1000 INJECTION, SOLUTION INTRAVENOUS
Qty: 0 | Refills: 0 | Status: DISCONTINUED | OUTPATIENT
Start: 2017-03-23 | End: 2017-03-29

## 2017-03-23 RX ORDER — INSULIN LISPRO 100/ML
VIAL (ML) SUBCUTANEOUS AT BEDTIME
Qty: 0 | Refills: 0 | Status: DISCONTINUED | OUTPATIENT
Start: 2017-03-23 | End: 2017-03-29

## 2017-03-23 RX ORDER — GLUCAGON INJECTION, SOLUTION 0.5 MG/.1ML
1 INJECTION, SOLUTION SUBCUTANEOUS ONCE
Qty: 0 | Refills: 0 | Status: DISCONTINUED | OUTPATIENT
Start: 2017-03-23 | End: 2017-03-29

## 2017-03-23 RX ORDER — VANCOMYCIN HCL 1 G
125 VIAL (EA) INTRAVENOUS EVERY 6 HOURS
Qty: 0 | Refills: 0 | Status: DISCONTINUED | OUTPATIENT
Start: 2017-03-23 | End: 2017-03-29

## 2017-03-23 RX ORDER — QUETIAPINE FUMARATE 200 MG/1
50 TABLET, FILM COATED ORAL AT BEDTIME
Qty: 0 | Refills: 0 | Status: DISCONTINUED | OUTPATIENT
Start: 2017-03-23 | End: 2017-03-29

## 2017-03-23 RX ADMIN — Medication 125 MILLIGRAM(S): at 12:36

## 2017-03-23 RX ADMIN — Medication 100 MILLIGRAM(S): at 13:20

## 2017-03-23 RX ADMIN — AMLODIPINE BESYLATE 5 MILLIGRAM(S): 2.5 TABLET ORAL at 06:10

## 2017-03-23 RX ADMIN — SODIUM CHLORIDE 75 MILLILITER(S): 9 INJECTION INTRAMUSCULAR; INTRAVENOUS; SUBCUTANEOUS at 04:35

## 2017-03-23 RX ADMIN — Medication 1 MILLIGRAM(S): at 12:38

## 2017-03-23 RX ADMIN — Medication 1 TABLET(S): at 09:52

## 2017-03-23 RX ADMIN — Medication 40 MILLIEQUIVALENT(S): at 14:21

## 2017-03-23 RX ADMIN — Medication 1 TABLET(S): at 12:37

## 2017-03-23 RX ADMIN — Medication 325 MILLIGRAM(S): at 12:38

## 2017-03-23 RX ADMIN — Medication 50 GRAM(S): at 09:52

## 2017-03-23 RX ADMIN — Medication 1 TABLET(S): at 17:32

## 2017-03-23 RX ADMIN — Medication 40 MILLIEQUIVALENT(S): at 09:51

## 2017-03-23 RX ADMIN — Medication 1000 MILLILITER(S): at 19:11

## 2017-03-23 RX ADMIN — Medication 125 MILLIGRAM(S): at 17:31

## 2017-03-23 RX ADMIN — QUETIAPINE FUMARATE 50 MILLIGRAM(S): 200 TABLET, FILM COATED ORAL at 22:48

## 2017-03-23 RX ADMIN — Medication 125 MILLIGRAM(S): at 07:32

## 2017-03-23 RX ADMIN — Medication 100 MILLIGRAM(S): at 06:10

## 2017-03-23 RX ADMIN — Medication 500 MILLIGRAM(S): at 12:37

## 2017-03-23 RX ADMIN — Medication 1 TABLET(S): at 12:38

## 2017-03-23 RX ADMIN — SODIUM CHLORIDE 75 MILLILITER(S): 9 INJECTION INTRAMUSCULAR; INTRAVENOUS; SUBCUTANEOUS at 22:48

## 2017-03-23 RX ADMIN — ATORVASTATIN CALCIUM 40 MILLIGRAM(S): 80 TABLET, FILM COATED ORAL at 22:48

## 2017-03-23 RX ADMIN — Medication 1000 MILLILITER(S): at 22:49

## 2017-03-23 NOTE — ADVANCED PRACTICE NURSE CONSULT - RECOMMEDATIONS
Recommendations for topical management:    Nutrition consult to evaluate and optimize nutrition for healing.    Sacrum: NPWT with Silver Granufoam, three times a week (M/W/F), setting of 125mm Recommendations for topical management:    Nutrition consult to evaluate and optimize nutrition for healing.    Sacrum: NPWT with Silver Granufoam, three times a week (M/W/F), setting of 125mm/Hg, with silicone contact layer applied over exposed bone, Maeve's barrier ring cut to strips to create bridge between perirectal area & ulceration and to facilitate seal..    Left lateral Malleolus: Clean with SAF-Clens Apply Liquid barrier film to periwound skin. Apply MediHoney gel to base, cover with silicone foam with border. Change daily & continue to observe fro changes in tissue type.    Moisture associated dermatitis under pannus & bilateral breasts: Place Interdry AG textile sheeting under pannus & bilateral breasts, leaving 2 inches exposed on ends to wick moisture, remove to wash & dry affected area, then replace. Individual sheeting may be used for up to 5 days unless soiled.     IAD risk: clean with perineal cleaning spray. Apply TRIAD paste twice a day and after incontinence episodes single breathable under pad.      Atrac-Tain cream to bilateral LE intact, dry, flaky skin, avoid use between the toes. apply daily & prn as needed.    Discussed with patient & daughter importance of nutrition, turning & repositioning q2h while in bed, use of Z-flex boots while in bed to off-load heels and seat cushion while sitting in chair and shifting position every 20-30 minutes, and topical management.    Continue low air loss bed therapy, initiate seat cushion when sitting in chair, continue Z-Flex boots for heel elevation, continue to turn & reposition q2h, continue moisture management with barrier cream (TRIAD) & single breathable pad, continue measures to decrease friction/shear/pressure. Recommend Z-Timothy fluidized positioning device for pressure redistribution and assist with repositioning, nutrition as per dietary recommendations and monitor weights.    Please call [facility] service line at x [phone], if we can be of further assistance.

## 2017-03-23 NOTE — H&P ADULT. - PROBLEM SELECTOR PLAN 4
- asymptomatic but with grossly positive UA in setting of chronic muniz  - f/u BCx x2 and UCx  - may be additional source of infection - meets sepsis criteria with fever, tachycardia, leukocytosis with source likely C diff colitis but r/o stage IV sacral DTI with possible abscess as well as UTI  - s/p NS 2L in ED and will start LR@100 x24

## 2017-03-23 NOTE — H&P ADULT. - RADIOLOGY RESULTS AND INTERPRETATION
personally reviewed and interpreted: CT A/P with contrast colitis of descending colon, sigmoid, proctitis; large midline sacral DTI with ?abscess in the L margin, ulcer abuts bone and unable to r/o OM

## 2017-03-23 NOTE — H&P ADULT. - ATTENDING COMMENTS
77 y/o female HX of DM, HTN, Iron Def. anemia, Stage 4 Sacral Decubiti , Pt had Vac in the past, admitted for BRBPR, x one day , + fever, + C Diff in NH, loose stool, + Weakness, confusion, Occult Stool +, Na 129, WBC 19.61, Leukocytosis, Hgb 6.7 , UA +, Chronic Howell Cath, pt is A+O x 1-2? treated with IV Vanco in the NH, CT A/P C/W + Colitis, R/O Osteo , Sacral Decubiti ulcer with possible Abscess     WOUND Consult, ID Consult, Surgery Consult, PO Vanco, IV Flagyl, Fall/aspiration precaution, IVF NS @ 75 cc/hr x 24 hours, Bacid, Clear Liquid Diet, Venodyne for DVT Prophylaxis, Transfuse 2 units PRBC , Urine Culture, FS, Sliding scale, F/U CBC, CMP, Cultures , GI Consult   K+ 3.1. Mg 1.6, Replace K+ and Mg     Case D/W Pt, HS, Pt's daughter    Pt was seen & Examined by me, Dr. PATTI Cerda on 3/23/2017.

## 2017-03-23 NOTE — ADVANCED PRACTICE NURSE CONSULT - ASSESSMENT
A&Ox4, ambulates with walker, urethral catheter in place, incontinent of stool with diaper use at facility. Skin warm, dry with increased moisture in intertriginous folds, adequate skin turgor, scattered areas of hyperpigmentation and hypopigmentation bilateral buttocks and posterior upper thighs.    Bilateral feet with dry, flaky skin with multiple areas of darkened skin pigmentation, areas of hypopigmentation & hyperpigmentation, healed sites of previous pressure injury. No temperature changes noted. Capillary refill = 3 seconds. Unable to palpate bilateral DP or PT pulses, with monophasic doppler sounds. Reports intermittent bilateral feet tingling.    Sacrum, chronic full thickness, stage 4 pressure injury, ulceration has decreased in size since last seen, bone visible at base, chronic rolled edges- epibole, 11cm x 9.5cm x 5.5cm, (measured with patient lying on right side) base 20% scattered areas of moist, yellow slough, firmly attached, 80% agranular- (10% scattered areas of hyperpigmentation, 70% pink, flat, minimally moist with scattered areas of thin fibrin exudate- suspicion of high bioburden), scant serous drainage, no odor, periwound skin with hypopigmentation surrounded by hyperpigmentation, no induration, no erythema, no increased warmth. Goals of care: protect periwound skin, decrease bioburden, absorb.    Left lateral Malleolus, chronic unstageable pressure injury, mixed etiology pressure & arterial, [id number], unable to determine complete anatomical depth due to necrotic tissue, chronic rolled edges- epibole, base 100% moist, yellow slough, firmly attached, small sero-fibrinous drainage, no odor, periwound skin with hypopigmentation, surrounded by hyperpigmentation, no induration, no erythema, no increased warmth. Goals of care: protect periwound skin, decrease bioburden, maintain moist environment, facilitate autolytic debridement, absorb.    Moisture associated dermatitis under pannus & bilateral breasts moist, chronic skin changes- hyperpigmentation, fissures noted under right side of pannus. Goals of care: wick moisture, provide antimicrobial protection.    Findings discussed with primary team, Dr Bowman, recommend NPWT/VAC with silver foam three times a week (M/W/F), recommend follow up [facility] Wound MD consult, Dr Riley ([phone]) to evaluate sacrum stage 4 and left lateral malleolus unstageable pressure injuries.

## 2017-03-23 NOTE — H&P ADULT. - PROBLEM SELECTOR PLAN 9
- 3/4/17 Hb 8.0 per NH records  - will decrease FeSO4 to QD as BID dosing not documented to be more efficacious than QD but will start vitamin C 500 QD to aid in absorption

## 2017-03-23 NOTE — H&P ADULT. - PROBLEM SELECTOR PLAN 6
- HDS despite sepsis and will continue home amlodipine 5 QD - with hypoalbuminemia 1.7 and elevated INR 1.75 in setting of recent severe illness  - will resume home Glucerna BID and consider starting Prosource when pt able to take regular diet  - nutrition consult

## 2017-03-23 NOTE — H&P ADULT. - MUSCULOSKELETAL
details… detailed exam no joint warmth/no joint erythema/no joint swelling no joint warmth/no joint erythema/no joint swelling/decreased ROM

## 2017-03-23 NOTE — PATIENT PROFILE ADULT. - LOCATION
sacrococcygeal extending to bilateral buttocks and perirectal area Right posterior thigh Left lateral malleolus

## 2017-03-23 NOTE — H&P ADULT. - PROBLEM SELECTOR PLAN 3
- meets sepsis criteria with fever, tachycardia, leukocytosis with source likely C diff colitis but r/o stage IV sacral DTI with possible abscess as well as UTI  - s/p NS 2L in ED and will start LR@100 x24 - 129 with BUN:Cr>20 indicating pt likely dehydrated  - s/p NS 2L in ED and will start NS@75 x24h  - monitor BMP for Na and BUN/Cr

## 2017-03-23 NOTE — H&P ADULT. - LYMPHATIC
supraclavicular L/posterior cervical R/supraclavicular R/anterior cervical L/anterior cervical R/posterior cervical L

## 2017-03-23 NOTE — H&P ADULT. - PROBLEM SELECTOR PLAN 7
- 3/4/17 Hb 8.0 per NH records  - will decrease FeSO4 to QD as BID dosing not documented to be more efficacious than QD but will start vitamin C 500 QD to aid in absorption - A1c 5.8 at last admission and well controlled  - clears with ADA diet  - unclear regarding pt's appetite and so will decrease HISS from moderate to low dose with AC+HS

## 2017-03-23 NOTE — H&P ADULT. - GASTROINTESTINAL DETAILS
no rebound tenderness/normal/no organomegaly/soft/no rigidity/nontender/no distention/no masses palpable/bowel sounds normal/no guarding no rebound tenderness/no guarding/no organomegaly/bowel sounds normal/no distention/no rigidity/soft/nontender/no masses palpable

## 2017-03-23 NOTE — H&P ADULT. - LAB RESULTS AND INTERPRETATION
personally reviewed and interpreted: leukocytosis with PMN predominance, marked thrombocytosis, elevated INR, hypoalbuminemia, elevated lactate that resolved with IVF, UA+, FOBT+ personally reviewed and interpreted: leukocytosis with PMN predominance, marked thrombocytosis, hyponatremia, elevated INR, hypoalbuminemia, elevated lactate that resolved with IVF, UA+, FOBT+

## 2017-03-23 NOTE — H&P ADULT. - HISTORY OF PRESENT ILLNESS
Note: history obtained from chart review and daughter, Alejandrina Chacon, at bedside as pt with significant cognitive decline since septic shock (see below) and AAOx1.  Medication list obtained from paperwork sent with pt from NH.    76F h/o septic shock 2/2 stage IV sacral DTI with abscess 12/2016, dementia, IDDM (1/2017 A1c 5.8), chronic iron deficiency anemia sent from [facility] for BRBPR x2.  Per daughter at bedside, pt on 3/17/17 with progressive diarrhea and low grade temperature.  At that time, pt was pancultured and empirically started on vancomycin 1g QD x5d for presumed sacral DTI as source.  Diarrhea progressed to become profuse, such that pt became incontinent of stool, and then progressed to include bright red blood 2d PTA.  C diff PCR was sent and positive.  Pt was initiated on Flagyl 500q8 PO 2d PTA (3/21) with plan for 14d course.  However, with second episode of profuse BRBPR on day PTA, pt's daughter requested pt be transferred to [facility].    After arrival, pt had 3 episodes of hematochezia associated with diarrhea; during rectal exam by ED provider, upon withdrawal of finger, approximately 1 cup of blood followed with stool.  At time of exam, loose stool on bed but no BRBPR.  Per daughter, pt "not herself" for the past week and has been weaker, unable to hoist herself out of a chair anymore and requiring two person assist to ambulate with a walker, as well as exhibiting slurred speech.  For some weeks prior to 3/17, pt began intermittently having generalized shakes, initially with plans for a Neuro consult. However, due to suspicion of infection, consult was delayed.  Denies CP, SOB, cough, nasal sx, sore throat, N/V/C, abdominal pain, dysuria.    Of note, pt immig[place] to [place] at the age of 26 from [place].  However, 6 years after residential, pt moved back to [facility] and had lived there for a few years.  Pt was in usual state of health in 11/2016 but then became progressive weaker.  In 12/2016, pt was found unresponsive at home and was admitted to the hospital for septic shock 2/2 stage IV sacral DTI with abscess.  Pt underwent debridement x2 and received 4u PRBCs with plans for further debridement and reconstruction.  As pt was likely to require more extensive surgery, pt's children elected to bring [place] and on 1/11/17 arrived and was brought directly to [facility].  Pt was admitted until 2/4/17 and was seen by Plastics, who did not recommend reconstruction, Wound Care Dr Riley, and ID Dr Balwinder Su.  BCx were negative but due to wound culture growing ESBL E. Coli, Enterococcus faecalis, Acinetobacter baumannii, and Candida albicans, pt had a PICC placed by IR and was given a 6 week course of tigecycline and fluconazole, which she completed on 2/27/17.  Wound was debrided during hospitalization and was discharged with wound vac, which is still in place and clamped.  Course was c/b BONG likely 2/2 vancomycin.  Since 12/2016, pt has had significant weight loss, which daughter estimates to be ~50 lbs.    ED Course  VS Tmax 100.8 HR  -155/55-67 RR 18 SpO2 % RA   WBC 19.61 ANC 15,310 (78%) Plt 781 (500s-600s in 1/2017)  INR 1.75 Alb 1.7 lactate 2.7 --> 1.6 (after IVF)  UA protein 30, nitrite(+), large LE, WBC 25-50, few mucus, few bacteria  Given NS 2L, Flagyl 500 IVPB x1, vancomycin 500 PO x1.  BCx x2, UCx, C diff PCR were sent. Note: history obtained from chart review and daughter, Alejandrina Chacon, at bedside as pt with significant cognitive decline since septic shock (see below) and AAOx1.  Medication list obtained from paperwork sent with pt from NH.    76F h/o septic shock 2/2 stage IV sacral DTI with abscess 12/2016, dementia, HTN, IDDM (1/2017 A1c 5.8), chronic iron deficiency anemia sent from [facility] for BRBPR x2.  Per daughter at bedside, pt on 3/17/17 with progressive diarrhea and low grade temperature.  At that time, pt was pancultured and empirically started on vancomycin 1g QD x5d for presumed sacral DTI as source.  Diarrhea progressed to become profuse, such that pt became incontinent of stool, and then progressed to include bright red blood 2d PTA.  C diff PCR was sent and positive.  Pt was initiated on Flagyl 500q8 PO 2d PTA (3/21) with plan for 14d course.  However, with second episode of profuse BRBPR on day PTA, pt's daughter requested pt be transferred to [facility].    After arrival, pt had 3 episodes of hematochezia associated with diarrhea; during rectal exam by ED provider, upon withdrawal of finger, approximately 1 cup of blood followed with stool.  At time of exam, loose stool on bed but no BRBPR.  Per daughter, pt "not herself" for the past week and has been weaker, unable to hoist herself out of a chair anymore and requiring two person assist to ambulate with a walker, as well as exhibiting slurred speech.  For some weeks prior to 3/17, pt began intermittently having generalized shakes, initially with plans for a Neuro consult. However, due to suspicion of infection, consult was delayed.  Denies CP, SOB, cough, nasal sx, sore throat, N/V/C, abdominal pain, dysuria.    Of note, pt [place] at the age of 26 from [place].  However, 6 years after [facility], pt moved back to [facility] and had lived there for a few years.  Pt was in usual state of health in 11/2016 but then became progressive weaker.  In 12/2016, pt was found unresponsive at home and was admitted to the hospital for septic shock 2/2 stage IV sacral DTI with abscess.  Pt underwent debridement x2 and received 4u PRBCs with plans for further debridement and reconstruction.  As pt was likely to require more extensive surgery, pt's children elected to bring [place] and on 1/11/17 arrived and was brought directly to [facility].  Pt was admitted until 2/4/17 and was seen by Plastics, who did not recommend reconstruction, Wound Care Dr Riley, and ID Dr Balwinder Su.  BCx were negative but due to wound culture growing ESBL E. Coli, Enterococcus faecalis, Acinetobacter baumannii, and Candida albicans, pt had a PICC placed by IR and was given a 6 week course of tigecycline and fluconazole, which she completed on 2/27/17.  Wound was debrided during hospitalization and was discharged with wound vac, which is still in place and clamped.  Course was c/b BONG likely 2/2 vancomycin.  Since 12/2016, pt has had significant weight loss, which daughter estimates to be ~50 lbs.    ED Course  VS Tmax 100.8 HR  -155/55-67 RR 18 SpO2 % RA   WBC 19.61 ANC 15,310 (78%) Plt 781 (500s-600s in 1/2017)  INR 1.75 Alb 1.7 lactate 2.7 --> 1.6 (after IVF)  UA protein 30, nitrite(+), large LE, WBC 25-50, few mucus, few bacteria  CT A/P with contrast colitis of descending colon, sigmoid, proctitis; large midline sacral DTI with ?abscess in the L margin, ulcer abuts bone and unable to r/o OM  Given NS 2L, Flagyl 500 IVPB x1, vancomycin 500 PO x1.  BCx x2, UCx, C diff PCR were sent. Note: history obtained from chart review and daughter, Alejandrina Chacon, at bedside as pt with significant cognitive decline since septic shock (see below) and AAOx1.  Medication list obtained from paperwork sent with pt from NH.    76F h/o septic shock 2/2 stage IV sacral DTI with abscess 12/2016, dementia, HTN, IDDM (1/2017 A1c 5.8), chronic iron deficiency anemia sent from [facility] for BRBPR x2.  Per daughter at bedside, pt on 3/17/17 with progressive diarrhea and low grade temperature.  At that time, pt was pancultured and empirically started on vancomycin 1g QD x5d for presumed sacral DTI as source.  Diarrhea progressed to become profuse, such that pt became incontinent of stool, and then progressed to include bright red blood 2d PTA.  C diff PCR was sent and positive.  Pt was initiated on Flagyl 500q8 PO 2d PTA (3/21) with plan for 14d course.  However, with second episode of profuse BRBPR on day PTA, pt's daughter requested pt be transferred to [facility].    After arrival, pt had 3 episodes of hematochezia associated with diarrhea; during rectal exam by ED provider, upon withdrawal of finger, approximately 1 cup of blood followed with stool.  At time of exam, loose stool on bed but no BRBPR.  Per daughter, pt "not herself" for the past week and has been weaker, unable to hoist herself out of a chair anymore and requiring two person assist to ambulate with a walker, as well as exhibiting slurred speech.  For some weeks prior to 3/17, pt began intermittently having generalized shakes, initially with plans for a Neuro consult. However, due to suspicion of infection, consult was delayed.  Denies CP, SOB, cough, nasal sx, sore throat, N/V/C, abdominal pain, dysuria.    Of note, pt [place] at the age of 26 from [place].  However, 6 years after group home, pt moved back to [facility] and had lived there for a few years.  Pt was in usual state of health in 11/2016 but then became progressive weaker.  In 12/2016, pt was found unresponsive at home and was admitted to the hospital for septic shock 2/2 stage IV sacral DTI with abscess.  Pt underwent debridement x2 and received 4u PRBCs with plans for further debridement and reconstruction.  As pt was likely to require more extensive surgery, pt's children elected to bring [place] and on 1/11/17 arrived and was brought directly to [facility].  Pt was admitted until 2/4/17 and was seen by Plastics, who did not recommend reconstruction, Wound Care Dr Riley, and ID Dr Balwinder Su.  BCx were negative but due to wound culture growing ESBL E. Coli, Enterococcus faecalis, Acinetobacter baumannii, and Candida albicans, pt had a PICC placed by IR and was given a 6 week course of tigecycline and fluconazole, which she completed on 2/27/17.  Wound was debrided during hospitalization and was discharged with wound vac, which is still in place and clamped.  Course was c/b BONG likely 2/2 vancomycin.  Since 12/2016, pt has had significant weight loss, which daughter estimates to be ~50 lbs.    ED Course  VS Tmax 100.8 HR  -155/55-67 RR 18 SpO2 % RA   WBC 19.61 ANC 15,310 (78%) Plt 781 (500s-600s in 1/2017) Na 129  INR 1.75 Alb 1.7 lactate 2.7 --> 1.6 (after IVF)  UA protein 30, nitrite(+), large LE, WBC 25-50, few mucus, few bacteria  CT A/P with contrast colitis of descending colon, sigmoid, proctitis; large midline sacral DTI with ?abscess in the L margin, ulcer abuts bone and unable to r/o OM  Given NS 2L, Flagyl 500 IVPB x1, vancomycin 500 PO x1.  BCx x2, UCx, C diff PCR were sent.

## 2017-03-23 NOTE — ADVANCED PRACTICE NURSE CONSULT - REASON FOR CONSULT
Patient seen on skin care rounds, after wound care referral received from MD, to evaluate impaired skin integrity and recommend topical management. Chart reviewed: Serum albumin 1.3g/dl (previous admission 2.3g/dL), Saroj Estes, daughter, Alejandrina, at bedside, patient interviewed A&Ox4, reports was ambulating with walker with at rehab facility, poor appetite, urethral catheter present at rehab facility, occasional fecal incontinence with use of diaper at facility, as per chart, H/O DM, HTN and stage 4 pressure injury, patient was retired and relocated back to [facility], found unresponsive at home, in [facility], hospitalized with Urosepsis, septic shock, s/p surgical debridement x2, in [facility], patient was flown from [facility] back to [facility] to [facility] for management of chronic full thickness, stage 4 pressure injury, sepsis, on systemic antibiotics, currently admitted with C-Difficile colitis, BRBPR, on systemic antibiotics,  presents with urethral to catheter to BSD, chronic sacrum stage 4, left lateral malleolus chronic unstageable pressure injuries and moisture associated dermatitis. Patient being followed by Medicine service. Patient was previously followed by Dr Riley, Wound MD, during previous hospitalization. Patient seen on skin care rounds, after wound care referral received from MD, to evaluate impaired skin integrity and recommend topical management. Chart reviewed: Serum albumin 1.3g/dL (previous admission 2.3g/dL), Saroj 11, current weight 65.2kg, BMI 26.3, daughter, Alejandrina, at bedside-reports recent weight loss, patient interviewed A&Ox4, reports was ambulating with walker with at rehab facility, poor appetite, urethral catheter present at rehab facility, occasional fecal incontinence with use of diaper at facility, as per chart, H/O DM, HTN and stage 4 pressure injury, patient was retired and relocated back to [facility], found unresponsive at home, in [facility], hospitalized with Urosepsis, septic shock, s/p surgical debridement x2, in [facility], patient was flown from [facility] back to [facility] to [facility] for management of chronic full thickness, stage 4 pressure injury, sepsis, on systemic antibiotics, currently admitted with C-Difficile colitis, BRBPR, on systemic antibiotics,  presents with urethral to catheter to BSD, chronic sacrum stage 4, left lateral malleolus chronic unstageable pressure injuries and moisture associated dermatitis. Patient being followed by Medicine service. Patient was previously followed by Dr Riley, Wound MD, during previous hospitalization. Patient seen on skin care rounds, after wound care referral received from MD, to evaluate impaired skin integrity and recommend topical management. Chart reviewed: Serum albumin 1.3g/dL (previous admission 2.3g/dL), Saroj 11, current weight 65.2kg, BMI 26.3, daughter, Alejandrina, at bedside-reports recent weight loss, patient interviewed A&Ox4, reports was ambulating with walker with at rehab facility, poor appetite, chronic urethral catheter due to incontinence, occasional fecal incontinence with use of diaper at facility, as per chart, H/O DM, HTN and stage 4 pressure injury, patient was retired and relocated back to [facility], found unresponsive at home, in [facility], hospitalized with Urosepsis, septic shock, s/p surgical debridement x2, in [facility], patient was flown from [facility] back to [facility] to [facility] for management of chronic full thickness, stage 4 pressure injury, sepsis, was on systemic antibiotics x 6 weeks via PICC line, currently admitted with C-Difficile colitis, BRBPR, on systemic antibiotics,  presents with urethral to catheter to BSD, chronic sacrum stage 4, left lateral malleolus chronic unstageable pressure injuries and moisture associated dermatitis. Patient being followed by Medicine service. Patient was previously followed by Dr Riley, Wound MD, during previous hospitalization.

## 2017-03-23 NOTE — H&P ADULT. - PROBLEM SELECTOR PLAN 5
- A1c 5.8 at last admission  - as NPO, will reduce HISS to low dose q6 - asymptomatic but with grossly positive UA in setting of chronic muniz  - f/u BCx x2 and UCx  - may be additional source of infection vs colonization  - hold off on abx until UCx result

## 2017-03-23 NOTE — H&P ADULT. - ASSESSMENT
76F h/o septic shock 2/2 stage IV sacral DTI with abscess 12/2016, dementia, HTN, IDDM (1/2017 A1c 5.8), chronic iron deficiency anemia sent from [facility] for BRBPR x2 likely 2/2 C diff colitis.

## 2017-03-23 NOTE — H&P ADULT. - PROBLEM SELECTOR PLAN 1
- with diarrhea and hematochezia consistent with severe disease; colitis of descending colon, sigmoid, and proctitis observed on CT A/P  - meets sepsis criteria with fever, tachycardia, leukocytosis  - monitor H/H closely with goal Hb>8  - f/u C diff PCR, BCx x2  - s/p Flagyl PO 3/21 and IVPB in ED with vancomycin 500 PO x1  - as no ileus, will start vancomycin 125 PO QID and will consider uptitrating if no clinical improvement in 24-48h or if develops further complications - with diarrhea and hematochezia consistent with severe disease; colitis of descending colon, sigmoid, and proctitis observed on CT A/P  - meets sepsis criteria with fever, tachycardia, leukocytosis  - monitor H/H closely with goal Hb>8  - f/u C diff PCR, BCx x2  - s/p Flagyl PO 3/21 and IVPB in ED with vancomycin 500 PO x1; will c/w Flagyl 500 IVPB q8, vancomycin 125 PO QID  - clear diet and start NS@75  - obtain GI consult in AM

## 2017-03-23 NOTE — H&P ADULT. - PROBLEM SELECTOR PLAN 2
- question of abscess on CT A/P and abuts bone with concern for OM; previous source for septic shock  - wound vac in place and clamped  - Wound Care consult  - - question of abscess on CT A/P and abuts bone with concern for OM; previous source for septic shock  - wound vac in place and clamped  - obtain Surgery and ID consult in AM for assessment whether pt requires abx coverage for DTI  - Wound Care consult  -

## 2017-03-23 NOTE — H&P ADULT. - RS GEN PE MLT RESP DETAILS PC
breath sounds equal/no rales/no rhonchi/respirations non-labored/good air movement/clear to auscultation bilaterally/no intercostal retractions/normal/no wheezes no rhonchi/no intercostal retractions/respirations non-labored/good air movement/no rales/breath sounds equal/clear to auscultation bilaterally/no wheezes

## 2017-03-24 LAB
ALBUMIN SERPL ELPH-MCNC: 1.5 G/DL — LOW (ref 3.3–5)
ALP SERPL-CCNC: 83 U/L — SIGNIFICANT CHANGE UP (ref 40–120)
ALT FLD-CCNC: 12 U/L — SIGNIFICANT CHANGE UP (ref 4–33)
APTT BLD: 32.8 SEC — SIGNIFICANT CHANGE UP (ref 27.5–37.4)
AST SERPL-CCNC: 19 U/L — SIGNIFICANT CHANGE UP (ref 4–32)
BASOPHILS # BLD AUTO: 0.02 K/UL — SIGNIFICANT CHANGE UP (ref 0–0.2)
BASOPHILS NFR BLD AUTO: 0.2 % — SIGNIFICANT CHANGE UP (ref 0–2)
BILIRUB SERPL-MCNC: 0.3 MG/DL — SIGNIFICANT CHANGE UP (ref 0.2–1.2)
BUN SERPL-MCNC: 3 MG/DL — LOW (ref 7–23)
BUN SERPL-MCNC: 4 MG/DL — LOW (ref 7–23)
CALCIUM SERPL-MCNC: 7 MG/DL — LOW (ref 8.4–10.5)
CALCIUM SERPL-MCNC: 7 MG/DL — LOW (ref 8.4–10.5)
CHLORIDE SERPL-SCNC: 106 MMOL/L — SIGNIFICANT CHANGE UP (ref 98–107)
CHLORIDE SERPL-SCNC: 109 MMOL/L — HIGH (ref 98–107)
CO2 SERPL-SCNC: 17 MMOL/L — LOW (ref 22–31)
CO2 SERPL-SCNC: 17 MMOL/L — LOW (ref 22–31)
CREAT SERPL-MCNC: 0.32 MG/DL — LOW (ref 0.5–1.3)
CREAT SERPL-MCNC: 0.38 MG/DL — LOW (ref 0.5–1.3)
EOSINOPHIL # BLD AUTO: 0.14 K/UL — SIGNIFICANT CHANGE UP (ref 0–0.5)
EOSINOPHIL NFR BLD AUTO: 1.1 % — SIGNIFICANT CHANGE UP (ref 0–6)
GLUCOSE SERPL-MCNC: 111 MG/DL — HIGH (ref 70–99)
GLUCOSE SERPL-MCNC: 210 MG/DL — HIGH (ref 70–99)
HCT VFR BLD CALC: 23.3 % — LOW (ref 34.5–45)
HGB BLD-MCNC: 8.2 G/DL — LOW (ref 11.5–15.5)
IMM GRANULOCYTES NFR BLD AUTO: 0.7 % — SIGNIFICANT CHANGE UP (ref 0–1.5)
INR BLD: 1.93 — HIGH (ref 0.88–1.17)
LYMPHOCYTES # BLD AUTO: 2.74 K/UL — SIGNIFICANT CHANGE UP (ref 1–3.3)
LYMPHOCYTES # BLD AUTO: 20.9 % — SIGNIFICANT CHANGE UP (ref 13–44)
MAGNESIUM SERPL-MCNC: 1.8 MG/DL — SIGNIFICANT CHANGE UP (ref 1.6–2.6)
MAGNESIUM SERPL-MCNC: 1.9 MG/DL — SIGNIFICANT CHANGE UP (ref 1.6–2.6)
MCHC RBC-ENTMCNC: 28 PG — SIGNIFICANT CHANGE UP (ref 27–34)
MCHC RBC-ENTMCNC: 35.2 % — SIGNIFICANT CHANGE UP (ref 32–36)
MCV RBC AUTO: 79.5 FL — LOW (ref 80–100)
MONOCYTES # BLD AUTO: 0.52 K/UL — SIGNIFICANT CHANGE UP (ref 0–0.9)
MONOCYTES NFR BLD AUTO: 4 % — SIGNIFICANT CHANGE UP (ref 2–14)
NEUTROPHILS # BLD AUTO: 9.62 K/UL — HIGH (ref 1.8–7.4)
NEUTROPHILS NFR BLD AUTO: 73.1 % — SIGNIFICANT CHANGE UP (ref 43–77)
PHOSPHATE SERPL-MCNC: 2.1 MG/DL — LOW (ref 2.5–4.5)
PHOSPHATE SERPL-MCNC: 2.9 MG/DL — SIGNIFICANT CHANGE UP (ref 2.5–4.5)
PLATELET # BLD AUTO: 568 K/UL — HIGH (ref 150–400)
PMV BLD: 8.7 FL — SIGNIFICANT CHANGE UP (ref 7–13)
POTASSIUM SERPL-MCNC: 3.1 MMOL/L — LOW (ref 3.5–5.3)
POTASSIUM SERPL-MCNC: 3.8 MMOL/L — SIGNIFICANT CHANGE UP (ref 3.5–5.3)
POTASSIUM SERPL-SCNC: 3.1 MMOL/L — LOW (ref 3.5–5.3)
POTASSIUM SERPL-SCNC: 3.8 MMOL/L — SIGNIFICANT CHANGE UP (ref 3.5–5.3)
PREALB SERPL-MCNC: 3 MG/DL — LOW (ref 20–40)
PROT SERPL-MCNC: 5 G/DL — LOW (ref 6–8.3)
PROTHROM AB SERPL-ACNC: 21.9 SEC — HIGH (ref 9.8–13.1)
RBC # BLD: 2.93 M/UL — LOW (ref 3.8–5.2)
RBC # FLD: 14.9 % — HIGH (ref 10.3–14.5)
SODIUM SERPL-SCNC: 135 MMOL/L — SIGNIFICANT CHANGE UP (ref 135–145)
SODIUM SERPL-SCNC: 138 MMOL/L — SIGNIFICANT CHANGE UP (ref 135–145)
SPECIMEN SOURCE: SIGNIFICANT CHANGE UP
WBC # BLD: 13.13 K/UL — HIGH (ref 3.8–10.5)
WBC # FLD AUTO: 13.13 K/UL — HIGH (ref 3.8–10.5)

## 2017-03-24 PROCEDURE — 99233 SBSQ HOSP IP/OBS HIGH 50: CPT | Mod: GC

## 2017-03-24 PROCEDURE — 88305 TISSUE EXAM BY PATHOLOGIST: CPT | Mod: 26

## 2017-03-24 PROCEDURE — 45380 COLONOSCOPY AND BIOPSY: CPT | Mod: GC

## 2017-03-24 PROCEDURE — 99232 SBSQ HOSP IP/OBS MODERATE 35: CPT

## 2017-03-24 RX ORDER — POTASSIUM PHOSPHATE, MONOBASIC POTASSIUM PHOSPHATE, DIBASIC 236; 224 MG/ML; MG/ML
15 INJECTION, SOLUTION INTRAVENOUS ONCE
Qty: 0 | Refills: 0 | Status: COMPLETED | OUTPATIENT
Start: 2017-03-24 | End: 2017-03-24

## 2017-03-24 RX ORDER — POTASSIUM CHLORIDE 20 MEQ
40 PACKET (EA) ORAL ONCE
Qty: 0 | Refills: 0 | Status: COMPLETED | OUTPATIENT
Start: 2017-03-24 | End: 2017-03-24

## 2017-03-24 RX ADMIN — Medication 1 MILLIGRAM(S): at 17:12

## 2017-03-24 RX ADMIN — Medication 1 TABLET(S): at 17:11

## 2017-03-24 RX ADMIN — ATORVASTATIN CALCIUM 40 MILLIGRAM(S): 80 TABLET, FILM COATED ORAL at 22:37

## 2017-03-24 RX ADMIN — Medication 100 MILLIGRAM(S): at 06:07

## 2017-03-24 RX ADMIN — Medication 500 MILLIGRAM(S): at 17:11

## 2017-03-24 RX ADMIN — Medication 125 MILLIGRAM(S): at 06:07

## 2017-03-24 RX ADMIN — Medication 125 MILLIGRAM(S): at 23:32

## 2017-03-24 RX ADMIN — Medication 325 MILLIGRAM(S): at 17:11

## 2017-03-24 RX ADMIN — QUETIAPINE FUMARATE 50 MILLIGRAM(S): 200 TABLET, FILM COATED ORAL at 22:37

## 2017-03-24 RX ADMIN — Medication 100 MILLIGRAM(S): at 17:13

## 2017-03-24 RX ADMIN — Medication 125 MILLIGRAM(S): at 19:38

## 2017-03-24 RX ADMIN — POTASSIUM PHOSPHATE, MONOBASIC POTASSIUM PHOSPHATE, DIBASIC 62.5 MILLIMOLE(S): 236; 224 INJECTION, SOLUTION INTRAVENOUS at 07:26

## 2017-03-24 RX ADMIN — Medication 125 MILLIGRAM(S): at 14:26

## 2017-03-24 RX ADMIN — Medication 40 MILLIEQUIVALENT(S): at 07:26

## 2017-03-24 RX ADMIN — Medication 125 MILLIGRAM(S): at 00:15

## 2017-03-24 NOTE — DIETITIAN INITIAL EVALUATION ADULT. - PROBLEM SELECTOR PLAN 1
- with diarrhea and hematochezia consistent with severe disease; colitis of descending colon, sigmoid, and proctitis observed on CT A/P  - meets sepsis criteria with fever, tachycardia, leukocytosis  - monitor H/H closely with goal Hb>8  - f/u C diff PCR, BCx x2  - s/p Flagyl PO 3/21 and IVPB in ED with vancomycin 500 PO x1; will c/w Flagyl 500 IVPB q8, vancomycin 125 PO QID  - clear diet and start NS@75  - obtain GI consult in AM

## 2017-03-24 NOTE — DIETITIAN INITIAL EVALUATION ADULT. - PROBLEM SELECTOR PLAN 3
- 129 with BUN:Cr>20 indicating pt likely dehydrated  - s/p NS 2L in ED and will start NS@75 x24h  - monitor BMP for Na and BUN/Cr

## 2017-03-24 NOTE — DIETITIAN INITIAL EVALUATION ADULT. - NS AS NUTRI INTERV MEALS SNACK
1) When medically feasible, suggest advance diet to Low Fiber and supplement with Ensure Enlive 240mls 2x daily (700kcal, 40g protein) + No Carb Prosource 2x/day (30g protein); may increase frequency as tolerated and accepted.    2) Suggest Multivitamin 1x daily for micronutrient coverage     3) Although unable to meet criteria for diagnosis of malnutrition at this time due to limited history obtained, patient remains at risk for malnutrition and dietitian remains available for further recommendations as needed

## 2017-03-24 NOTE — DIETITIAN INITIAL EVALUATION ADULT. - PT NOT SOURCE
other (specify)/poor historian; A+Ox1. Per H&P, significant cognitive decline since septic shock which was noted secondary to stage IV sacral DTI with abscess 12/2016

## 2017-03-24 NOTE — DIETITIAN INITIAL EVALUATION ADULT. - DIET TYPE
NPO after midnight/Clear Liquids, Consistent Carbohydrate (evening snack), Ensure Clears 3x daily (600 kcals, 21g protein).

## 2017-03-24 NOTE — DIETITIAN INITIAL EVALUATION ADULT. - OTHER INFO
Nutrition consult received for calorie count, issues chewing/swallowing and pressure ulcer greater than stage 2; admitted from [facility] for BRBR x2. Patient on contact precautions for positive C.diff and NPO pending colonoscopy today. Unable to contact daughter Alejandrina [phone] for further diet history and patient is a poor historian and very forgetful. Unable to identify any issues in chewing/swallowing at this time by nursing as patient has not yet received any meals. No nausea/vomiting or food allergies otherwise noted.

## 2017-03-24 NOTE — DIETITIAN INITIAL EVALUATION ADULT. - PROBLEM SELECTOR PLAN 7
- A1c 5.8 at last admission and well controlled  - clears with ADA diet  - unclear regarding pt's appetite and so will decrease HISS from moderate to low dose with AC+HS

## 2017-03-24 NOTE — DIETITIAN INITIAL EVALUATION ADULT. - PROBLEM SELECTOR PLAN 2
- question of abscess on CT A/P and abuts bone with concern for OM; previous source for septic shock  - wound vac in place and clamped  - obtain Surgery and ID consult in AM for assessment whether pt requires abx coverage for DTI  - Wound Care consult  -

## 2017-03-24 NOTE — DIETITIAN INITIAL EVALUATION ADULT. - PERTINENT LABORATORY DATA
K+ 3.1L, BUN 4L, Creatinine 0.38L, Glucose 210H, Prealbumin 3L, (1/13/17) HbA1C 5.8% K+ 3.1L, BUN 4L, Creatinine 0.38L, Glucose 210H, Prealbumin 3L, (1/13/17) HbA1C 5.8%; Finger sticks <120 with exception of 182 on 3/24

## 2017-03-24 NOTE — DIETITIAN INITIAL EVALUATION ADULT. - PROBLEM SELECTOR PLAN 4
- meets sepsis criteria with fever, tachycardia, leukocytosis with source likely C diff colitis but r/o stage IV sacral DTI with possible abscess as well as UTI  - s/p NS 2L in ED and will start LR@100 x24

## 2017-03-24 NOTE — PHYSICAL THERAPY INITIAL EVALUATION ADULT - PERTINENT HX OF CURRENT PROBLEM, REHAB EVAL
76F h/o septic shock 2/2 stage IV sacral DTI with abscess 12/2016, dementia, HTN, IDDM (1/2017 A1c 5.8), chronic iron deficiency anemia sent from [facility] for BRBPR x2 likely 2/2 C diff colitis.

## 2017-03-24 NOTE — DIETITIAN INITIAL EVALUATION ADULT. - PROBLEM SELECTOR PLAN 6
- with hypoalbuminemia 1.7 and elevated INR 1.75 in setting of recent severe illness  - will resume home Glucerna BID and consider starting Prosource when pt able to take regular diet  - nutrition consult

## 2017-03-25 LAB
-  AMIKACIN: SIGNIFICANT CHANGE UP
-  AZTREONAM: SIGNIFICANT CHANGE UP
-  CEFEPIME: SIGNIFICANT CHANGE UP
-  CEFTAZIDIME: SIGNIFICANT CHANGE UP
-  CIPROFLOXACIN: SIGNIFICANT CHANGE UP
-  GENTAMICIN: SIGNIFICANT CHANGE UP
-  IMIPENEM: SIGNIFICANT CHANGE UP
-  LEVOFLOXACIN: SIGNIFICANT CHANGE UP
-  MEROPENEM: SIGNIFICANT CHANGE UP
-  PIPERACILLIN/TAZOBACTAM: SIGNIFICANT CHANGE UP
-  TOBRAMYCIN: SIGNIFICANT CHANGE UP
ALBUMIN SERPL ELPH-MCNC: 1.5 G/DL — LOW (ref 3.3–5)
ALP SERPL-CCNC: 80 U/L — SIGNIFICANT CHANGE UP (ref 40–120)
ALT FLD-CCNC: 13 U/L — SIGNIFICANT CHANGE UP (ref 4–33)
AST SERPL-CCNC: 20 U/L — SIGNIFICANT CHANGE UP (ref 4–32)
B PERT DNA SPEC QL NAA+PROBE: SIGNIFICANT CHANGE UP
BACTERIA UR CULT: SIGNIFICANT CHANGE UP
BILIRUB SERPL-MCNC: 0.2 MG/DL — SIGNIFICANT CHANGE UP (ref 0.2–1.2)
BUN SERPL-MCNC: 2 MG/DL — LOW (ref 7–23)
C PNEUM DNA SPEC QL NAA+PROBE: NOT DETECTED — SIGNIFICANT CHANGE UP
CALCIUM SERPL-MCNC: 7.3 MG/DL — LOW (ref 8.4–10.5)
CHLORIDE SERPL-SCNC: 108 MMOL/L — HIGH (ref 98–107)
CO2 SERPL-SCNC: 17 MMOL/L — LOW (ref 22–31)
CREAT SERPL-MCNC: 0.34 MG/DL — LOW (ref 0.5–1.3)
FLUAV H1 2009 PAND RNA SPEC QL NAA+PROBE: POSITIVE — HIGH
FLUAV H1 RNA SPEC QL NAA+PROBE: NOT DETECTED — SIGNIFICANT CHANGE UP
FLUAV H3 RNA SPEC QL NAA+PROBE: NOT DETECTED — SIGNIFICANT CHANGE UP
FLUBV RNA SPEC QL NAA+PROBE: NOT DETECTED — SIGNIFICANT CHANGE UP
GLUCOSE SERPL-MCNC: 106 MG/DL — HIGH (ref 70–99)
HADV DNA SPEC QL NAA+PROBE: NOT DETECTED — SIGNIFICANT CHANGE UP
HCOV 229E RNA SPEC QL NAA+PROBE: NOT DETECTED — SIGNIFICANT CHANGE UP
HCOV HKU1 RNA SPEC QL NAA+PROBE: NOT DETECTED — SIGNIFICANT CHANGE UP
HCOV NL63 RNA SPEC QL NAA+PROBE: NOT DETECTED — SIGNIFICANT CHANGE UP
HCOV OC43 RNA SPEC QL NAA+PROBE: NOT DETECTED — SIGNIFICANT CHANGE UP
HCT VFR BLD CALC: 25.9 % — LOW (ref 34.5–45)
HGB BLD-MCNC: 8.9 G/DL — LOW (ref 11.5–15.5)
HMPV RNA SPEC QL NAA+PROBE: NOT DETECTED — SIGNIFICANT CHANGE UP
HPIV1 RNA SPEC QL NAA+PROBE: NOT DETECTED — SIGNIFICANT CHANGE UP
HPIV2 RNA SPEC QL NAA+PROBE: NOT DETECTED — SIGNIFICANT CHANGE UP
HPIV3 RNA SPEC QL NAA+PROBE: NOT DETECTED — SIGNIFICANT CHANGE UP
HPIV4 RNA SPEC QL NAA+PROBE: NOT DETECTED — SIGNIFICANT CHANGE UP
M PNEUMO DNA SPEC QL NAA+PROBE: NOT DETECTED — SIGNIFICANT CHANGE UP
MAGNESIUM SERPL-MCNC: 1.6 MG/DL — SIGNIFICANT CHANGE UP (ref 1.6–2.6)
MCHC RBC-ENTMCNC: 27.3 PG — SIGNIFICANT CHANGE UP (ref 27–34)
MCHC RBC-ENTMCNC: 34.4 % — SIGNIFICANT CHANGE UP (ref 32–36)
MCV RBC AUTO: 79.4 FL — LOW (ref 80–100)
METHOD TYPE: SIGNIFICANT CHANGE UP
ORGANISM # SPEC MICROSCOPIC CNT: SIGNIFICANT CHANGE UP
ORGANISM # SPEC MICROSCOPIC CNT: SIGNIFICANT CHANGE UP
PHOSPHATE SERPL-MCNC: 2.2 MG/DL — LOW (ref 2.5–4.5)
PLATELET # BLD AUTO: 530 K/UL — HIGH (ref 150–400)
PMV BLD: 8.4 FL — SIGNIFICANT CHANGE UP (ref 7–13)
POTASSIUM SERPL-MCNC: 3.7 MMOL/L — SIGNIFICANT CHANGE UP (ref 3.5–5.3)
POTASSIUM SERPL-SCNC: 3.7 MMOL/L — SIGNIFICANT CHANGE UP (ref 3.5–5.3)
PROT SERPL-MCNC: 5.2 G/DL — LOW (ref 6–8.3)
RBC # BLD: 3.26 M/UL — LOW (ref 3.8–5.2)
RBC # FLD: 15.1 % — HIGH (ref 10.3–14.5)
RSV RNA SPEC QL NAA+PROBE: NOT DETECTED — SIGNIFICANT CHANGE UP
RV+EV RNA SPEC QL NAA+PROBE: NOT DETECTED — SIGNIFICANT CHANGE UP
SODIUM SERPL-SCNC: 136 MMOL/L — SIGNIFICANT CHANGE UP (ref 135–145)
WBC # BLD: 11.49 K/UL — HIGH (ref 3.8–10.5)
WBC # FLD AUTO: 11.49 K/UL — HIGH (ref 3.8–10.5)

## 2017-03-25 PROCEDURE — 71010: CPT | Mod: 26

## 2017-03-25 PROCEDURE — 99233 SBSQ HOSP IP/OBS HIGH 50: CPT | Mod: GC

## 2017-03-25 RX ORDER — POTASSIUM PHOSPHATE, MONOBASIC POTASSIUM PHOSPHATE, DIBASIC 236; 224 MG/ML; MG/ML
15 INJECTION, SOLUTION INTRAVENOUS ONCE
Qty: 0 | Refills: 0 | Status: COMPLETED | OUTPATIENT
Start: 2017-03-25 | End: 2017-03-25

## 2017-03-25 RX ADMIN — Medication 125 MILLIGRAM(S): at 06:39

## 2017-03-25 RX ADMIN — Medication 1 TABLET(S): at 15:05

## 2017-03-25 RX ADMIN — Medication 500 MILLIGRAM(S): at 15:04

## 2017-03-25 RX ADMIN — Medication 1 TABLET(S): at 17:53

## 2017-03-25 RX ADMIN — Medication 1 MILLIGRAM(S): at 15:05

## 2017-03-25 RX ADMIN — Medication 325 MILLIGRAM(S): at 15:04

## 2017-03-25 RX ADMIN — Medication 3: at 15:29

## 2017-03-25 RX ADMIN — QUETIAPINE FUMARATE 50 MILLIGRAM(S): 200 TABLET, FILM COATED ORAL at 22:06

## 2017-03-25 RX ADMIN — Medication 650 MILLIGRAM(S): at 18:05

## 2017-03-25 RX ADMIN — POTASSIUM PHOSPHATE, MONOBASIC POTASSIUM PHOSPHATE, DIBASIC 62.5 MILLIMOLE(S): 236; 224 INJECTION, SOLUTION INTRAVENOUS at 10:13

## 2017-03-25 RX ADMIN — Medication 1 TABLET(S): at 15:04

## 2017-03-25 RX ADMIN — Medication 125 MILLIGRAM(S): at 15:05

## 2017-03-25 RX ADMIN — Medication 100 MILLIGRAM(S): at 06:39

## 2017-03-25 RX ADMIN — Medication 125 MILLIGRAM(S): at 17:53

## 2017-03-25 RX ADMIN — Medication 1 TABLET(S): at 09:01

## 2017-03-25 RX ADMIN — ATORVASTATIN CALCIUM 40 MILLIGRAM(S): 80 TABLET, FILM COATED ORAL at 22:06

## 2017-03-26 ENCOUNTER — TRANSCRIPTION ENCOUNTER (OUTPATIENT)
Age: 77
End: 2017-03-26

## 2017-03-26 LAB
BUN SERPL-MCNC: < 2 MG/DL — LOW (ref 7–23)
CALCIUM SERPL-MCNC: 7.2 MG/DL — LOW (ref 8.4–10.5)
CHLORIDE SERPL-SCNC: 104 MMOL/L — SIGNIFICANT CHANGE UP (ref 98–107)
CO2 SERPL-SCNC: 19 MMOL/L — LOW (ref 22–31)
CREAT SERPL-MCNC: 0.52 MG/DL — SIGNIFICANT CHANGE UP (ref 0.5–1.3)
GLUCOSE SERPL-MCNC: 183 MG/DL — HIGH (ref 70–99)
HCT VFR BLD CALC: 24.6 % — LOW (ref 34.5–45)
HGB BLD-MCNC: 8.5 G/DL — LOW (ref 11.5–15.5)
MAGNESIUM SERPL-MCNC: 1.4 MG/DL — LOW (ref 1.6–2.6)
MCHC RBC-ENTMCNC: 27.6 PG — SIGNIFICANT CHANGE UP (ref 27–34)
MCHC RBC-ENTMCNC: 34.6 % — SIGNIFICANT CHANGE UP (ref 32–36)
MCV RBC AUTO: 79.9 FL — LOW (ref 80–100)
PHOSPHATE SERPL-MCNC: 2.1 MG/DL — LOW (ref 2.5–4.5)
PLATELET # BLD AUTO: 515 K/UL — HIGH (ref 150–400)
PMV BLD: 8.7 FL — SIGNIFICANT CHANGE UP (ref 7–13)
POTASSIUM SERPL-MCNC: 3.7 MMOL/L — SIGNIFICANT CHANGE UP (ref 3.5–5.3)
POTASSIUM SERPL-SCNC: 3.7 MMOL/L — SIGNIFICANT CHANGE UP (ref 3.5–5.3)
RBC # BLD: 3.08 M/UL — LOW (ref 3.8–5.2)
RBC # FLD: 15.4 % — HIGH (ref 10.3–14.5)
SODIUM SERPL-SCNC: 134 MMOL/L — LOW (ref 135–145)
WBC # BLD: 12.2 K/UL — HIGH (ref 3.8–10.5)
WBC # FLD AUTO: 12.2 K/UL — HIGH (ref 3.8–10.5)

## 2017-03-26 PROCEDURE — 99233 SBSQ HOSP IP/OBS HIGH 50: CPT | Mod: GC

## 2017-03-26 PROCEDURE — 99232 SBSQ HOSP IP/OBS MODERATE 35: CPT

## 2017-03-26 RX ORDER — MAGNESIUM SULFATE 500 MG/ML
2 VIAL (ML) INJECTION ONCE
Qty: 0 | Refills: 0 | Status: COMPLETED | OUTPATIENT
Start: 2017-03-26 | End: 2017-03-26

## 2017-03-26 RX ORDER — POTASSIUM PHOSPHATE, MONOBASIC POTASSIUM PHOSPHATE, DIBASIC 236; 224 MG/ML; MG/ML
15 INJECTION, SOLUTION INTRAVENOUS ONCE
Qty: 0 | Refills: 0 | Status: COMPLETED | OUTPATIENT
Start: 2017-03-26 | End: 2017-03-26

## 2017-03-26 RX ORDER — IPRATROPIUM/ALBUTEROL SULFATE 18-103MCG
3 AEROSOL WITH ADAPTER (GRAM) INHALATION EVERY 6 HOURS
Qty: 0 | Refills: 0 | Status: DISCONTINUED | OUTPATIENT
Start: 2017-03-26 | End: 2017-03-29

## 2017-03-26 RX ADMIN — ATORVASTATIN CALCIUM 40 MILLIGRAM(S): 80 TABLET, FILM COATED ORAL at 22:00

## 2017-03-26 RX ADMIN — Medication 1 MILLIGRAM(S): at 12:23

## 2017-03-26 RX ADMIN — Medication 125 MILLIGRAM(S): at 23:25

## 2017-03-26 RX ADMIN — Medication 100 MILLIGRAM(S): at 12:24

## 2017-03-26 RX ADMIN — Medication 100 MILLIGRAM(S): at 06:04

## 2017-03-26 RX ADMIN — Medication 1: at 13:36

## 2017-03-26 RX ADMIN — Medication 3 MILLILITER(S): at 16:11

## 2017-03-26 RX ADMIN — Medication 125 MILLIGRAM(S): at 12:23

## 2017-03-26 RX ADMIN — Medication 75 MILLIGRAM(S): at 06:06

## 2017-03-26 RX ADMIN — POTASSIUM PHOSPHATE, MONOBASIC POTASSIUM PHOSPHATE, DIBASIC 62.5 MILLIMOLE(S): 236; 224 INJECTION, SOLUTION INTRAVENOUS at 13:36

## 2017-03-26 RX ADMIN — Medication 3 MILLILITER(S): at 22:01

## 2017-03-26 RX ADMIN — Medication 50 GRAM(S): at 12:23

## 2017-03-26 RX ADMIN — QUETIAPINE FUMARATE 50 MILLIGRAM(S): 200 TABLET, FILM COATED ORAL at 22:00

## 2017-03-26 RX ADMIN — Medication 125 MILLIGRAM(S): at 20:43

## 2017-03-26 RX ADMIN — Medication 1 TABLET(S): at 12:23

## 2017-03-26 RX ADMIN — Medication 100 MILLIGRAM(S): at 22:00

## 2017-03-26 RX ADMIN — Medication 125 MILLIGRAM(S): at 06:05

## 2017-03-26 RX ADMIN — Medication 125 MILLIGRAM(S): at 00:32

## 2017-03-26 RX ADMIN — Medication 75 MILLIGRAM(S): at 20:42

## 2017-03-26 RX ADMIN — Medication 500 MILLIGRAM(S): at 12:23

## 2017-03-26 RX ADMIN — Medication 325 MILLIGRAM(S): at 12:23

## 2017-03-26 RX ADMIN — Medication: at 05:28

## 2017-03-26 RX ADMIN — Medication 75 MILLIGRAM(S): at 00:32

## 2017-03-26 NOTE — DISCHARGE NOTE ADULT - PLAN OF CARE
Resolution Please continue to take your oral vancomycin for the full 10 day course as prescribed. Today, the day of discharge to rehabilitation is day 7. Management You will continue to receive daily wound care as well as wound vac 3 times each week at rehabilitation for your sacral ulcer. Please continue to take Tamiflu 2 times each day for 5 days. Today, the day of discharge is day 4. Continue nebulizer treatments for shortness of breath.

## 2017-03-26 NOTE — DISCHARGE NOTE ADULT - CARE PROVIDER_API CALL
Kathya Riley), Surgery  [address] [place] [address]  Phone: [phone]  Fax: [phone]    Balwinder Su; MBBS), Infectious Disease; Internal Medicine  [address] [place] [address]  Phone: [phone]  Fax: [phone]

## 2017-03-26 NOTE — DISCHARGE NOTE ADULT - CARE PLAN
Principal Discharge DX:	C. difficile colitis  Goal:	Resolution  Instructions for follow-up, activity and diet:	Please continue to take your oral vancomycin for the full 10 day course as prescribed. Today, the day of discharge to rehabilitation is day 7.  Secondary Diagnosis:	Sacral decubitus ulcer, stage IV  Goal:	Management  Instructions for follow-up, activity and diet:	You will continue to receive daily wound care as well as wound vac 3 times each week at rehabilitation for your sacral ulcer.  Secondary Diagnosis:	Flu  Goal:	Resolution  Instructions for follow-up, activity and diet:	Please continue to take Tamiflu 2 times each day for 5 days. Today, the day of discharge is day 4. Continue nebulizer treatments for shortness of breath.

## 2017-03-26 NOTE — DISCHARGE NOTE ADULT - CARE PROVIDERS DIRECT ADDRESSES
[email or web address]

## 2017-03-26 NOTE — DISCHARGE NOTE ADULT - MEDICATION SUMMARY - MEDICATIONS TO STOP TAKING
I will STOP taking the medications listed below when I get home from the hospital:    amLODIPine 5 mg oral tablet  -- 1 tab(s) by mouth once a day    senna oral tablet  -- 2 tab(s) by mouth once a day (at bedtime)    docusate sodium 100 mg oral capsule  -- 1 cap(s) by mouth once a day (at bedtime)    Flagyl 500 mg oral tablet  -- 1 tab(s) by mouth every 8 hours

## 2017-03-26 NOTE — DISCHARGE NOTE ADULT - COMMUNITY RESOURCES
[facility] [address] [place] [address] [phone]    [facility] [phone]

## 2017-03-26 NOTE — DISCHARGE NOTE ADULT - PATIENT PORTAL LINK FT
“You can access the FollowHealth Patient Portal, offered by [facility], by registering with the following website: [email or web address]”

## 2017-03-26 NOTE — DISCHARGE NOTE ADULT - MEDICATION SUMMARY - MEDICATIONS TO TAKE
I will START or STAY ON the medications listed below when I get home from the hospital:    acetaminophen 325 mg oral tablet  -- 2 tab(s) by mouth every 6 hours, As needed, For Temp greater than 38 C (100.4 F)  -- Indication: For Anti-Pyretic    acetaminophen 325 mg oral tablet  -- 2 tab(s) by mouth every 6 hours, As needed, Mild to Severe Pain  -- Indication: For Analgesia    insulin lispro 100 units/mL subcutaneous solution  --  subcutaneous 3 times a day (before meals); 1 Unit(s) if Glucose 151 - 200  2 Unit(s) if Glucose 201 - 250  3 Unit(s) if Glucose 251 - 300  4 Unit(s) if Glucose 301 - 350  5 Unit(s) if Glucose 351 - 400  6 Unit(s) if Glucose Greater Than 400  -- Indication: For DM2    insulin lispro 100 units/mL subcutaneous solution  --  subcutaneous once a day (at bedtime); 0 Unit(s) if Glucose 0 - 250  1 Unit(s) if Glucose 251 - 300  2 Unit(s) if Glucose 301 - 350  3 Unit(s) if Glucose 351 - 400  4 Unit(s) if Glucose Greater Than 400  -- Indication: For DM2    atorvastatin 40 mg oral tablet  -- 1 tab(s) by mouth once a day (at bedtime)  -- Indication: For HLD    QUEtiapine 50 mg oral tablet  -- 1 tab(s) by mouth once a day (at bedtime)  -- Indication: For Dementia    oseltamivir 75 mg oral capsule  -- 1 cap(s) by mouth 2 times a day  -- Indication: For Influenza    albuterol-ipratropium 2.5 mg-0.5 mg/3 mL inhalation solution  -- 3 milliliter(s) inhaled every 6 hours  -- Indication: For Dyspnea    guaiFENesin 100 mg/5 mL oral liquid  -- 5 milliliter(s) by mouth every 6 hours, As needed, Cough  -- Indication: For Cough    vancomycin 250 mg/5 mL oral solution  -- 2.5 milliliter(s) by mouth every 6 hours  -- Indication: For CDiff Colitis    Dextrose 5% in Water intravenous solution  -- 1000 milliliter(s) intravenous   -- Indication: For DM2    ferrous sulfate 325 mg (65 mg elemental iron) oral delayed release tablet  -- 1 tab(s) by mouth 2 times a day  -- Indication: For Iron deficiency anemia, unspecified iron deficiency anemia type    potassium chloride 20 mEq oral tablet, extended release  -- 2 tab(s) by mouth once  -- Indication: For Hypokalemia    lactobacillus acidophilus oral capsule  --  by mouth   -- Indication: For Probiotic    Multiple Vitamins oral tablet  -- 1 tab(s) by mouth once a day  -- Indication: For Dietary supplement    ascorbic acid 500 mg oral tablet  -- 1 tab(s) by mouth once a day  -- Indication: For Vitamin C supplement    folic acid 1 mg oral tablet  -- 1 tab(s) by mouth once a day  -- Indication: For Folate supplement

## 2017-03-26 NOTE — DISCHARGE NOTE ADULT - HOSPITAL COURSE
Hospital Course:  Patient admitted to medicine service. Treated with po Vancomycin for severe C. diff colitis. Required transfusion for acute blood loss anemia in setting of colitis. Colonoscopy done by GI which showed pseudomembranous colitis. H/H remained stable. UA positive and patient started on Cipro. Urine cx grew Psuedomonas, ID felt that this represents colonization and not infection, so Cipro d/c'ed. Wound care consulted for stage IV sacral decub, not thought to be infected. After developing URI symptoms, RVP was flu positive and patient started on Tamiflu and nebs for wheezing. Note: history obtained from chart review and daughter, Alejandrina Chacon, at bedside as pt with significant cognitive decline since septic shock (see below) and AAOx1.  Medication list obtained from paperwork sent with pt from NH.    76F h/o septic shock 2/2 stage IV sacral DTI with abscess 12/2016, dementia, HTN, IDDM (1/2017 A1c 5.8), chronic iron deficiency anemia sent from [facility] for BRBPR x2.  Per daughter at bedside, pt on 3/17/17 with progressive diarrhea and low grade temperature.  At that time, pt was pancultured and empirically started on vancomycin 1g QD x5d for presumed sacral DTI as source.  Diarrhea progressed to become profuse, such that pt became incontinent of stool, and then progressed to include bright red blood 2d PTA.  C diff PCR was sent and positive.  Pt was initiated on Flagyl 500q8 PO 2d PTA (3/21) with plan for 14d course.  However, with second episode of profuse BRBPR on day PTA, pt's daughter requested pt be transferred to [facility].    After arrival, pt had 3 episodes of hematochezia associated with diarrhea; during rectal exam by ED provider, upon withdrawal of finger, approximately 1 cup of blood followed with stool.  At time of exam, loose stool on bed but no BRBPR.  Per daughter, pt "not herself" for the past week and has been weaker, unable to hoist herself out of a chair anymore and requiring two person assist to ambulate with a walker, as well as exhibiting slurred speech.  For some weeks prior to 3/17, pt began intermittently having generalized shakes, initially with plans for a Neuro consult. However, due to suspicion of infection, consult was delayed.  Denies CP, SOB, cough, nasal sx, sore throat, N/V/C, abdominal pain, dysuria.    Of note, pt immig[place] at the age of 26 from [place].  However, 6 years after detention, pt moved back to [facility] and had lived there for a few years.  Pt was in usual state of health in 11/2016 but then became progressive weaker.  In 12/2016, pt was found unresponsive at home and was admitted to the hospital for septic shock 2/2 stage IV sacral DTI with abscess.  Pt underwent debridement x2 and received 4u PRBCs with plans for further debridement and reconstruction.  As pt was likely to require more extensive surgery, pt's children elected to bring [place] for [place] and on 1/11/17 arrived and was brought directly to [facility].  Pt was admitted until 2/4/17 and was seen by Plastics, who did not recommend reconstruction, Wound Care Dr Riley, and ID Dr Balwinder Su.  BCx were negative but due to wound culture growing ESBL E. Coli, Enterococcus faecalis, Acinetobacter baumannii, and Candida albicans, pt had a PICC placed by IR and was given a 6 week course of tigecycline and fluconazole, which she completed on 2/27/17.  Wound was debrided during hospitalization and was discharged with wound vac, which is still in place and clamped.  Course was c/b BONG likely 2/2 vancomycin.  Since 12/2016, pt has had significant weight loss, which daughter estimates to be ~50 lbs.    ED Course  VS Tmax 100.8 HR  -155/55-67 RR 18 SpO2 % RA   WBC 19.61 ANC 15,310 (78%) Plt 781 (500s-600s in 1/2017) Na 129  INR 1.75 Alb 1.7 lactate 2.7 --> 1.6 (after IVF)  UA protein 30, nitrite(+), large LE, WBC 25-50, few mucus, few bacteria  CT A/P with contrast colitis of descending colon, sigmoid, proctitis; large midline sacral DTI with ?abscess in the L margin, ulcer abuts bone and unable to r/o OM  Given NS 2L, Flagyl 500 IVPB x1, vancomycin 500 PO x1.  BCx x2, UCx, C diff PCR were sent.  Hospital Course:  Patient admitted to medicine service. Treated with po Vancomycin for severe C. diff colitis. Required transfusion for acute blood loss anemia in setting of colitis. Colonoscopy done by GI which showed pseudomembranous colitis. H/H remained stable. UA positive and patient started on Cipro. Urine cx grew Psuedomonas, ID felt that this represents colonization and not infection, so Cipro d/c'ed. Wound care consulted for stage IV sacral decub, not thought to be infected. After developing URI symptoms, RVP was flu positive and patient started on Tamiflu and nebs for wheezing. Patient continues on 10 day course PO vanc, with daily wound care, wound vac 3 times each week. Patient ready for discharge to rehabilitation facility. Note: history obtained from chart review and daughter, Alejandrina Chacon, at bedside as pt with significant cognitive decline since septic shock (see below) and AAOx1.  Medication list obtained from paperwork sent with pt from NH.    76F h/o septic shock 2/2 stage IV sacral DTI with abscess 12/2016, dementia, HTN, IDDM (1/2017 A1c 5.8), chronic iron deficiency anemia sent from [facility] for BRBPR x2.  Per daughter at bedside, pt on 3/17/17 with progressive diarrhea and low grade temperature.  At that time, pt was pancultured and empirically started on vancomycin 1g QD x5d for presumed sacral DTI as source.  Diarrhea progressed to become profuse, such that pt became incontinent of stool, and then progressed to include bright red blood 2d PTA.  C diff PCR was sent and positive.  Pt was initiated on Flagyl 500q8 PO 2d PTA (3/21) with plan for 14d course.  However, with second episode of profuse BRBPR on day PTA, pt's daughter requested pt be transferred to [facility].    After arrival, pt had 3 episodes of hematochezia associated with diarrhea; during rectal exam by ED provider, upon withdrawal of finger, approximately 1 cup of blood followed with stool.  At time of exam, loose stool on bed but no BRBPR.  Per daughter, pt "not herself" for the past week and has been weaker, unable to hoist herself out of a chair anymore and requiring two person assist to ambulate with a walker, as well as exhibiting slurred speech.  For some weeks prior to 3/17, pt began intermittently having generalized shakes, initially with plans for a Neuro consult. However, due to suspicion of infection, consult was delayed.  Denies CP, SOB, cough, nasal sx, sore throat, N/V/C, abdominal pain, dysuria.    Of note, pt immig[place] at the age of 26 from [place].  However, 6 years after detention, pt moved back to [facility] and had lived there for a few years.  Pt was in usual state of health in 11/2016 but then became progressive weaker.  In 12/2016, pt was found unresponsive at home and was admitted to the hospital for septic shock 2/2 stage IV sacral DTI with abscess.  Pt underwent debridement x2 and received 4u PRBCs with plans for further debridement and reconstruction.  As pt was likely to require more extensive surgery, pt's children elected to bring [place] and on 1/11/17 arrived and was brought directly to [facility].  Pt was admitted until 2/4/17 and was seen by Plastics, who did not recommend reconstruction, Wound Care Dr Riley, and ID Dr Balwinder Su.  BCx were negative but due to wound culture growing ESBL E. Coli, Enterococcus faecalis, Acinetobacter baumannii, and Candida albicans, pt had a PICC placed by IR and was given a 6 week course of tigecycline and fluconazole, which she completed on 2/27/17.  Wound was debrided during hospitalization and was discharged with wound vac, which is still in place and clamped.  Course was c/b BONG likely 2/2 vancomycin.  Since 12/2016, pt has had significant weight loss, which daughter estimates to be ~50 lbs.    ED Course  VS Tmax 100.8 HR  -155/55-67 RR 18 SpO2 % RA   WBC 19.61 ANC 15,310 (78%) Plt 781 (500s-600s in 1/2017) Na 129  INR 1.75 Alb 1.7 lactate 2.7 --> 1.6 (after IVF)  UA protein 30, nitrite(+), large LE, WBC 25-50, few mucus, few bacteria  CT A/P with contrast colitis of descending colon, sigmoid, proctitis; large midline sacral DTI with ?abscess in the L margin, ulcer abuts bone and unable to r/o OM  Given NS 2L, Flagyl 500 IVPB x1, vancomycin 500 PO x1.  BCx x2, UCx, C diff PCR were sent.  Hospital Course:  Patient admitted to medicine service. Treated with po Vancomycin for severe C. diff colitis. Required transfusion for acute blood loss anemia in setting of colitis. Colonoscopy done by GI which showed pseudomembranous colitis. H/H remained stable. UA positive and patient started on Cipro. Urine cx grew Psuedomonas, ID felt that this represents colonization due to muniz and not infection, so Cipro d/c'ed. Wound care consulted for stage IV sacral decub, not thought to be infected. After developing URI symptoms, RVP was flu positive and patient started on Tamiflu and nebs for wheezing. Patient continues on 10 day course PO vanc, with daily wound care, wound vac 3 times each week. Patient ready for discharge to rehabilitation facility.

## 2017-03-26 NOTE — DISCHARGE NOTE ADULT - CONDITIONS AT DISCHARGE
Patient a&ox4, VSS, no acute distress noted. No c/o of pain at this time. Patient admitted with unstageable pressure ulcer on left lateral malleolus (1x1), and stage IV on sacrum ([id number]). To be d/c to rehab with muniz.

## 2017-03-27 LAB
BACTERIA BLD CULT: SIGNIFICANT CHANGE UP
BACTERIA BLD CULT: SIGNIFICANT CHANGE UP
BASOPHILS # BLD AUTO: 0.03 K/UL — SIGNIFICANT CHANGE UP (ref 0–0.2)
BASOPHILS NFR BLD AUTO: 0.4 % — SIGNIFICANT CHANGE UP (ref 0–2)
BUN SERPL-MCNC: 5 MG/DL — LOW (ref 7–23)
CALCIUM SERPL-MCNC: 7 MG/DL — LOW (ref 8.4–10.5)
CHLORIDE SERPL-SCNC: 106 MMOL/L — SIGNIFICANT CHANGE UP (ref 98–107)
CO2 SERPL-SCNC: 19 MMOL/L — LOW (ref 22–31)
CREAT SERPL-MCNC: 0.48 MG/DL — LOW (ref 0.5–1.3)
EOSINOPHIL # BLD AUTO: 0.02 K/UL — SIGNIFICANT CHANGE UP (ref 0–0.5)
EOSINOPHIL NFR BLD AUTO: 0.2 % — SIGNIFICANT CHANGE UP (ref 0–6)
GLUCOSE SERPL-MCNC: 123 MG/DL — HIGH (ref 70–99)
HCT VFR BLD CALC: 23.5 % — LOW (ref 34.5–45)
HGB BLD-MCNC: 7.9 G/DL — LOW (ref 11.5–15.5)
IMM GRANULOCYTES NFR BLD AUTO: 0.4 % — SIGNIFICANT CHANGE UP (ref 0–1.5)
LYMPHOCYTES # BLD AUTO: 3.18 K/UL — SIGNIFICANT CHANGE UP (ref 1–3.3)
LYMPHOCYTES # BLD AUTO: 38.8 % — SIGNIFICANT CHANGE UP (ref 13–44)
MAGNESIUM SERPL-MCNC: 1.7 MG/DL — SIGNIFICANT CHANGE UP (ref 1.6–2.6)
MCHC RBC-ENTMCNC: 27 PG — SIGNIFICANT CHANGE UP (ref 27–34)
MCHC RBC-ENTMCNC: 33.6 % — SIGNIFICANT CHANGE UP (ref 32–36)
MCV RBC AUTO: 80.2 FL — SIGNIFICANT CHANGE UP (ref 80–100)
MONOCYTES # BLD AUTO: 0.44 K/UL — SIGNIFICANT CHANGE UP (ref 0–0.9)
MONOCYTES NFR BLD AUTO: 5.4 % — SIGNIFICANT CHANGE UP (ref 2–14)
NEUTROPHILS # BLD AUTO: 4.49 K/UL — SIGNIFICANT CHANGE UP (ref 1.8–7.4)
NEUTROPHILS NFR BLD AUTO: 54.8 % — SIGNIFICANT CHANGE UP (ref 43–77)
PHOSPHATE SERPL-MCNC: 2.5 MG/DL — SIGNIFICANT CHANGE UP (ref 2.5–4.5)
PLATELET # BLD AUTO: 252 K/UL — SIGNIFICANT CHANGE UP (ref 150–400)
PMV BLD: 9.3 FL — SIGNIFICANT CHANGE UP (ref 7–13)
POTASSIUM SERPL-MCNC: 5.1 MMOL/L — SIGNIFICANT CHANGE UP (ref 3.5–5.3)
POTASSIUM SERPL-SCNC: 5.1 MMOL/L — SIGNIFICANT CHANGE UP (ref 3.5–5.3)
RBC # BLD: 2.93 M/UL — LOW (ref 3.8–5.2)
RBC # FLD: 15.6 % — HIGH (ref 10.3–14.5)
SODIUM SERPL-SCNC: 134 MMOL/L — LOW (ref 135–145)
WBC # BLD: 8.19 K/UL — SIGNIFICANT CHANGE UP (ref 3.8–10.5)
WBC # FLD AUTO: 8.19 K/UL — SIGNIFICANT CHANGE UP (ref 3.8–10.5)

## 2017-03-27 PROCEDURE — 99233 SBSQ HOSP IP/OBS HIGH 50: CPT | Mod: GC

## 2017-03-27 PROCEDURE — 99232 SBSQ HOSP IP/OBS MODERATE 35: CPT

## 2017-03-27 RX ADMIN — Medication 500 MILLIGRAM(S): at 13:29

## 2017-03-27 RX ADMIN — Medication 1 TABLET(S): at 17:53

## 2017-03-27 RX ADMIN — Medication 75 MILLIGRAM(S): at 17:53

## 2017-03-27 RX ADMIN — Medication 2: at 13:33

## 2017-03-27 RX ADMIN — Medication 1 TABLET(S): at 08:57

## 2017-03-27 RX ADMIN — Medication 100 MILLIGRAM(S): at 16:15

## 2017-03-27 RX ADMIN — Medication 3: at 17:52

## 2017-03-27 RX ADMIN — Medication 100 MILLIGRAM(S): at 06:24

## 2017-03-27 RX ADMIN — Medication 75 MILLIGRAM(S): at 06:23

## 2017-03-27 RX ADMIN — Medication 100 MILLIGRAM(S): at 22:15

## 2017-03-27 RX ADMIN — Medication 125 MILLIGRAM(S): at 13:30

## 2017-03-27 RX ADMIN — Medication 3 MILLILITER(S): at 03:04

## 2017-03-27 RX ADMIN — Medication 1 MILLIGRAM(S): at 13:29

## 2017-03-27 RX ADMIN — Medication 3 MILLILITER(S): at 16:26

## 2017-03-27 RX ADMIN — Medication 3: at 08:57

## 2017-03-27 RX ADMIN — ATORVASTATIN CALCIUM 40 MILLIGRAM(S): 80 TABLET, FILM COATED ORAL at 21:57

## 2017-03-27 RX ADMIN — Medication 125 MILLIGRAM(S): at 17:53

## 2017-03-27 RX ADMIN — Medication 1 TABLET(S): at 13:29

## 2017-03-27 RX ADMIN — Medication 125 MILLIGRAM(S): at 23:57

## 2017-03-27 RX ADMIN — Medication 1 TABLET(S): at 13:30

## 2017-03-27 RX ADMIN — Medication 3 MILLILITER(S): at 09:56

## 2017-03-27 RX ADMIN — Medication 125 MILLIGRAM(S): at 06:23

## 2017-03-27 RX ADMIN — Medication 3 MILLILITER(S): at 22:29

## 2017-03-27 RX ADMIN — QUETIAPINE FUMARATE 50 MILLIGRAM(S): 200 TABLET, FILM COATED ORAL at 21:57

## 2017-03-27 RX ADMIN — Medication 325 MILLIGRAM(S): at 13:30

## 2017-03-28 PROCEDURE — 99232 SBSQ HOSP IP/OBS MODERATE 35: CPT

## 2017-03-28 PROCEDURE — 99233 SBSQ HOSP IP/OBS HIGH 50: CPT | Mod: GC

## 2017-03-28 PROCEDURE — 99223 1ST HOSP IP/OBS HIGH 75: CPT

## 2017-03-28 RX ADMIN — Medication 1: at 22:02

## 2017-03-28 RX ADMIN — Medication 3 MILLILITER(S): at 16:19

## 2017-03-28 RX ADMIN — Medication 1 MILLIGRAM(S): at 12:51

## 2017-03-28 RX ADMIN — Medication 1 TABLET(S): at 09:47

## 2017-03-28 RX ADMIN — Medication 1 TABLET(S): at 18:32

## 2017-03-28 RX ADMIN — Medication 125 MILLIGRAM(S): at 12:52

## 2017-03-28 RX ADMIN — Medication 3 MILLILITER(S): at 22:00

## 2017-03-28 RX ADMIN — Medication 3 MILLILITER(S): at 10:55

## 2017-03-28 RX ADMIN — Medication 1 TABLET(S): at 12:48

## 2017-03-28 RX ADMIN — Medication 75 MILLIGRAM(S): at 18:32

## 2017-03-28 RX ADMIN — Medication 125 MILLIGRAM(S): at 18:32

## 2017-03-28 RX ADMIN — Medication 125 MILLIGRAM(S): at 06:10

## 2017-03-28 RX ADMIN — Medication 3 MILLILITER(S): at 03:40

## 2017-03-28 RX ADMIN — ATORVASTATIN CALCIUM 40 MILLIGRAM(S): 80 TABLET, FILM COATED ORAL at 21:31

## 2017-03-28 RX ADMIN — QUETIAPINE FUMARATE 50 MILLIGRAM(S): 200 TABLET, FILM COATED ORAL at 21:31

## 2017-03-28 RX ADMIN — Medication 500 MILLIGRAM(S): at 13:29

## 2017-03-28 RX ADMIN — Medication 325 MILLIGRAM(S): at 12:51

## 2017-03-28 RX ADMIN — Medication 75 MILLIGRAM(S): at 06:10

## 2017-03-28 RX ADMIN — Medication 100 MILLIGRAM(S): at 21:31

## 2017-03-28 NOTE — SWALLOW BEDSIDE ASSESSMENT ADULT - SWALLOW EVAL: RECOMMENDED FEEDING/EATING TECHNIQUES
allow for swallow between intakes/check mouth frequently for oral residue/pocketing/no straws/maintain upright posture during/after eating for 30 mins/oral hygiene/small sips/bites/position upright (90 degrees)

## 2017-03-28 NOTE — SWALLOW BEDSIDE ASSESSMENT ADULT - COMMENTS
The patient was received for a bedside swallow evaluation at which time the patient was awake with generalized weak/fatigued appearance. Vocal quality observed to be weak and slightly strained. Chart review indicated pt is presently on a clear liquid diet; Discussed with Team 1 who reported pt is cleared for PO trials at this time.

## 2017-03-28 NOTE — SWALLOW BEDSIDE ASSESSMENT ADULT - ORAL PREPARATORY PHASE
Weak/slow lingual manipulation Decreased mastication ability/weak/slow lingual manipulation weak/slow lingual manipulation

## 2017-03-28 NOTE — SWALLOW BEDSIDE ASSESSMENT ADULT - ASR SWALLOW LINGUAL MOBILITY
impaired protrusion/impaired left lateral movement/impaired right lateral movement/impaired anterior elevation

## 2017-03-28 NOTE — SWALLOW BEDSIDE ASSESSMENT ADULT - SLP PERTINENT HISTORY OF CURRENT PROBLEM
R/O dysphagia. Pt is a 75 y/o female sent from [facility] for dx severe sepsis 2/2 C diff.

## 2017-03-28 NOTE — SWALLOW BEDSIDE ASSESSMENT ADULT - PHARYNGEAL PHASE
Delayed pharyngeal swallow/Decreased laryngeal elevation Decreased laryngeal elevation/Delayed pharyngeal swallow Delayed cough post oral intake/Decreased laryngeal elevation/Delayed pharyngeal swallow

## 2017-03-28 NOTE — SWALLOW BEDSIDE ASSESSMENT ADULT - ASR SWALLOW ASPIRATION MONITOR
change of breathing pattern/oral hygiene/fever/throat clearing/pneumonia/upper respiratory infection/position upright (90Y)/cough/gurgly voice

## 2017-03-28 NOTE — SWALLOW BEDSIDE ASSESSMENT ADULT - SWALLOW EVAL: DIAGNOSIS
1) Mild oral dysphagia for puree, mechanical soft, honey-thick and nectar-thick liquids marked by slow/weak lingual manipulation resulting in delayed bolus collection, transfer and transport; 2) Moderate oral dysphagia for regular solids marked by prolonged mastication time and slow/weak lingual manipulation resulting in delayed bolus collection, transfer and transport; 3) Mild pharyngeal dysphagia for puree, mechanical soft, honey-thick and nectar-thick liquids marked by delayed swallow trigger, decreased laryngeal elevation/excursion upon digital palpation, and no clinical evidence of ariway penetration/aspiration; 4) Moderate-severe pharyngeal dysphagia for thin liquids marked by delayed swallow trigger, decreased laryngeal elevation/excursion upon palpation, and delayed wet cough subsequent to deglutition indicating possible airway penetration and/or aspiration.

## 2017-03-29 VITALS — OXYGEN SATURATION: 100 %

## 2017-03-29 LAB
ANISOCYTOSIS BLD QL: SLIGHT — SIGNIFICANT CHANGE UP
BASOPHILS # BLD AUTO: 0.02 K/UL — SIGNIFICANT CHANGE UP (ref 0–0.2)
BASOPHILS NFR BLD AUTO: 0.2 % — SIGNIFICANT CHANGE UP (ref 0–2)
BASOPHILS NFR SPEC: 0 % — SIGNIFICANT CHANGE UP (ref 0–2)
BUN SERPL-MCNC: 3 MG/DL — LOW (ref 7–23)
BUN SERPL-MCNC: 4 MG/DL — LOW (ref 7–23)
CALCIUM SERPL-MCNC: 6.9 MG/DL — LOW (ref 8.4–10.5)
CALCIUM SERPL-MCNC: 7.1 MG/DL — LOW (ref 8.4–10.5)
CHLORIDE SERPL-SCNC: 103 MMOL/L — SIGNIFICANT CHANGE UP (ref 98–107)
CHLORIDE SERPL-SCNC: 99 MMOL/L — SIGNIFICANT CHANGE UP (ref 98–107)
CO2 SERPL-SCNC: 23 MMOL/L — SIGNIFICANT CHANGE UP (ref 22–31)
CO2 SERPL-SCNC: 23 MMOL/L — SIGNIFICANT CHANGE UP (ref 22–31)
CREAT SERPL-MCNC: 0.44 MG/DL — LOW (ref 0.5–1.3)
CREAT SERPL-MCNC: 0.45 MG/DL — LOW (ref 0.5–1.3)
EOSINOPHIL # BLD AUTO: 0 K/UL — SIGNIFICANT CHANGE UP (ref 0–0.5)
EOSINOPHIL NFR BLD AUTO: 0 % — SIGNIFICANT CHANGE UP (ref 0–6)
EOSINOPHIL NFR FLD: 0 % — SIGNIFICANT CHANGE UP (ref 0–6)
GIANT PLATELETS BLD QL SMEAR: PRESENT — SIGNIFICANT CHANGE UP
GLUCOSE SERPL-MCNC: 156 MG/DL — HIGH (ref 70–99)
GLUCOSE SERPL-MCNC: 99 MG/DL — SIGNIFICANT CHANGE UP (ref 70–99)
HCT VFR BLD CALC: 23.1 % — LOW (ref 34.5–45)
HGB BLD-MCNC: 8.1 G/DL — LOW (ref 11.5–15.5)
IMM GRANULOCYTES NFR BLD AUTO: 0.4 % — SIGNIFICANT CHANGE UP (ref 0–1.5)
LYMPHOCYTES # BLD AUTO: 3.24 K/UL — SIGNIFICANT CHANGE UP (ref 1–3.3)
LYMPHOCYTES # BLD AUTO: 40.3 % — SIGNIFICANT CHANGE UP (ref 13–44)
LYMPHOCYTES NFR SPEC AUTO: 7.8 % — LOW (ref 13–44)
MAGNESIUM SERPL-MCNC: 1.4 MG/DL — LOW (ref 1.6–2.6)
MAGNESIUM SERPL-MCNC: 2.1 MG/DL — SIGNIFICANT CHANGE UP (ref 1.6–2.6)
MCHC RBC-ENTMCNC: 27.2 PG — SIGNIFICANT CHANGE UP (ref 27–34)
MCHC RBC-ENTMCNC: 35.1 % — SIGNIFICANT CHANGE UP (ref 32–36)
MCV RBC AUTO: 77.5 FL — LOW (ref 80–100)
MICROCYTES BLD QL: SLIGHT — SIGNIFICANT CHANGE UP
MONOCYTES # BLD AUTO: 0.34 K/UL — SIGNIFICANT CHANGE UP (ref 0–0.9)
MONOCYTES NFR BLD AUTO: 4.2 % — SIGNIFICANT CHANGE UP (ref 2–14)
MONOCYTES NFR BLD: 3.5 % — SIGNIFICANT CHANGE UP (ref 2–9)
NEUTROPHIL AB SER-ACNC: 88.7 % — HIGH (ref 43–77)
NEUTROPHILS # BLD AUTO: 4.41 K/UL — SIGNIFICANT CHANGE UP (ref 1.8–7.4)
NEUTROPHILS NFR BLD AUTO: 54.9 % — SIGNIFICANT CHANGE UP (ref 43–77)
PHOSPHATE SERPL-MCNC: 2 MG/DL — LOW (ref 2.5–4.5)
PHOSPHATE SERPL-MCNC: 2 MG/DL — LOW (ref 2.5–4.5)
PLATELET # BLD AUTO: 366 K/UL — SIGNIFICANT CHANGE UP (ref 150–400)
PLATELET COUNT - ESTIMATE: NORMAL — SIGNIFICANT CHANGE UP
PMV BLD: 8.4 FL — SIGNIFICANT CHANGE UP (ref 7–13)
POTASSIUM SERPL-MCNC: 2.9 MMOL/L — CRITICAL LOW (ref 3.5–5.3)
POTASSIUM SERPL-MCNC: 3.1 MMOL/L — LOW (ref 3.5–5.3)
POTASSIUM SERPL-SCNC: 2.9 MMOL/L — CRITICAL LOW (ref 3.5–5.3)
POTASSIUM SERPL-SCNC: 3.1 MMOL/L — LOW (ref 3.5–5.3)
RBC # BLD: 2.98 M/UL — LOW (ref 3.8–5.2)
RBC # FLD: 15.3 % — HIGH (ref 10.3–14.5)
SODIUM SERPL-SCNC: 135 MMOL/L — SIGNIFICANT CHANGE UP (ref 135–145)
SODIUM SERPL-SCNC: 137 MMOL/L — SIGNIFICANT CHANGE UP (ref 135–145)
WBC # BLD: 8.04 K/UL — SIGNIFICANT CHANGE UP (ref 3.8–10.5)
WBC # FLD AUTO: 8.04 K/UL — SIGNIFICANT CHANGE UP (ref 3.8–10.5)

## 2017-03-29 PROCEDURE — 99239 HOSP IP/OBS DSCHRG MGMT >30: CPT

## 2017-03-29 RX ORDER — POTASSIUM CHLORIDE 20 MEQ
2 PACKET (EA) ORAL
Qty: 0 | Refills: 0 | COMMUNITY
Start: 2017-03-29

## 2017-03-29 RX ORDER — LACTOBACILLUS ACIDOPHILUS 100MM CELL
0 CAPSULE ORAL
Qty: 0 | Refills: 0 | COMMUNITY
Start: 2017-03-29

## 2017-03-29 RX ORDER — IPRATROPIUM/ALBUTEROL SULFATE 18-103MCG
3 AEROSOL WITH ADAPTER (GRAM) INHALATION
Qty: 0 | Refills: 0 | COMMUNITY
Start: 2017-03-29

## 2017-03-29 RX ORDER — ATORVASTATIN CALCIUM 80 MG/1
1 TABLET, FILM COATED ORAL
Qty: 0 | Refills: 0 | COMMUNITY
Start: 2017-03-29

## 2017-03-29 RX ORDER — QUETIAPINE FUMARATE 200 MG/1
1 TABLET, FILM COATED ORAL
Qty: 0 | Refills: 0 | COMMUNITY
Start: 2017-03-29

## 2017-03-29 RX ORDER — POTASSIUM PHOSPHATE, MONOBASIC POTASSIUM PHOSPHATE, DIBASIC 236; 224 MG/ML; MG/ML
15 INJECTION, SOLUTION INTRAVENOUS ONCE
Qty: 0 | Refills: 0 | Status: COMPLETED | OUTPATIENT
Start: 2017-03-29 | End: 2017-03-29

## 2017-03-29 RX ORDER — SODIUM CHLORIDE 9 MG/ML
1000 INJECTION, SOLUTION INTRAVENOUS
Qty: 0 | Refills: 0 | COMMUNITY
Start: 2017-03-29

## 2017-03-29 RX ORDER — INSULIN LISPRO 100/ML
0 VIAL (ML) SUBCUTANEOUS
Qty: 0 | Refills: 0 | COMMUNITY
Start: 2017-03-29

## 2017-03-29 RX ORDER — ACETAMINOPHEN 500 MG
2 TABLET ORAL
Qty: 0 | Refills: 0 | COMMUNITY
Start: 2017-03-29

## 2017-03-29 RX ORDER — ACETAMINOPHEN 500 MG
2 TABLET ORAL
Qty: 0 | Refills: 0 | COMMUNITY

## 2017-03-29 RX ORDER — LACTOBACILLUS ACIDOPHILUS 100MM CELL
1 CAPSULE ORAL
Qty: 0 | Refills: 0 | COMMUNITY

## 2017-03-29 RX ORDER — VANCOMYCIN HCL 1 G
2.5 VIAL (EA) INTRAVENOUS
Qty: 0 | Refills: 0 | COMMUNITY
Start: 2017-03-29

## 2017-03-29 RX ORDER — ASCORBIC ACID 60 MG
1 TABLET,CHEWABLE ORAL
Qty: 0 | Refills: 0 | COMMUNITY
Start: 2017-03-29

## 2017-03-29 RX ORDER — POTASSIUM CHLORIDE 20 MEQ
40 PACKET (EA) ORAL ONCE
Qty: 0 | Refills: 0 | Status: COMPLETED | OUTPATIENT
Start: 2017-03-29 | End: 2017-03-29

## 2017-03-29 RX ORDER — MAGNESIUM SULFATE 500 MG/ML
2 VIAL (ML) INJECTION ONCE
Qty: 0 | Refills: 0 | Status: COMPLETED | OUTPATIENT
Start: 2017-03-29 | End: 2017-03-29

## 2017-03-29 RX ADMIN — Medication 500 MILLIGRAM(S): at 12:26

## 2017-03-29 RX ADMIN — Medication 75 MILLIGRAM(S): at 06:55

## 2017-03-29 RX ADMIN — Medication 1 TABLET(S): at 08:53

## 2017-03-29 RX ADMIN — Medication 50 GRAM(S): at 10:00

## 2017-03-29 RX ADMIN — Medication 1: at 08:53

## 2017-03-29 RX ADMIN — Medication 40 MILLIEQUIVALENT(S): at 16:28

## 2017-03-29 RX ADMIN — Medication 1 TABLET(S): at 12:27

## 2017-03-29 RX ADMIN — Medication 100 MILLIGRAM(S): at 06:55

## 2017-03-29 RX ADMIN — Medication 3 MILLILITER(S): at 15:42

## 2017-03-29 RX ADMIN — Medication 1 MILLIGRAM(S): at 12:26

## 2017-03-29 RX ADMIN — Medication 125 MILLIGRAM(S): at 00:01

## 2017-03-29 RX ADMIN — POTASSIUM PHOSPHATE, MONOBASIC POTASSIUM PHOSPHATE, DIBASIC 62.5 MILLIMOLE(S): 236; 224 INJECTION, SOLUTION INTRAVENOUS at 12:26

## 2017-03-29 RX ADMIN — Medication 325 MILLIGRAM(S): at 12:26

## 2017-03-29 RX ADMIN — Medication 3 MILLILITER(S): at 10:06

## 2017-03-29 RX ADMIN — Medication 125 MILLIGRAM(S): at 06:55

## 2017-03-29 RX ADMIN — Medication 1 TABLET(S): at 12:26

## 2017-03-29 RX ADMIN — Medication 125 MILLIGRAM(S): at 12:26

## 2017-03-29 RX ADMIN — Medication 3 MILLILITER(S): at 04:17

## 2017-03-29 NOTE — PROVIDER CONTACT NOTE (CRITICAL VALUE NOTIFICATION) - RECOMMENDATIONS
pt is already being treated for c-diff, continue current treatment
MD brannon
Md. Parra Made aware.
Transfuse PRBC

## 2017-03-29 NOTE — PROVIDER CONTACT NOTE (CRITICAL VALUE NOTIFICATION) - ASSESSMENT
Patient alert and oriented with signs and symptoms of lethargy. Afebrile. Observed with non-productive cough. Denies chest pain.
Patient asymtopaitc, no acute distress noted
Rectal bleeding
pt is having loose stools

## 2017-03-29 NOTE — PROVIDER CONTACT NOTE (CRITICAL VALUE NOTIFICATION) - ACTION/TREATMENT ORDERED:
continue current treatment
Awaiting orders from MD for transfusion
MD Álvarez aware, potassium replenishment ordered, will continue to monitor
New order received for Tamiflu. Same administered. Ongoing monitoring for this patient.

## 2017-05-21 ENCOUNTER — INPATIENT (INPATIENT)
Facility: HOSPITAL | Age: 77
LOS: 0 days | DRG: 871 | End: 2017-05-22
Attending: INTERNAL MEDICINE | Admitting: INTERNAL MEDICINE
Payer: MEDICARE

## 2017-05-21 VITALS
OXYGEN SATURATION: 98 % | RESPIRATION RATE: 23 BRPM | SYSTOLIC BLOOD PRESSURE: 115 MMHG | HEART RATE: 116 BPM | HEIGHT: 57 IN | WEIGHT: 139.99 LBS | DIASTOLIC BLOOD PRESSURE: 81 MMHG

## 2017-05-21 DIAGNOSIS — L98.493 NON-PRESSURE CHRONIC ULCER OF SKIN OF OTHER SITES WITH NECROSIS OF MUSCLE: ICD-10-CM

## 2017-05-21 DIAGNOSIS — Z98.891 HISTORY OF UTERINE SCAR FROM PREVIOUS SURGERY: Chronic | ICD-10-CM

## 2017-05-21 DIAGNOSIS — Z29.9 ENCOUNTER FOR PROPHYLACTIC MEASURES, UNSPECIFIED: ICD-10-CM

## 2017-05-21 DIAGNOSIS — Z92.89 PERSONAL HISTORY OF OTHER MEDICAL TREATMENT: ICD-10-CM

## 2017-05-21 DIAGNOSIS — A41.9 SEPSIS, UNSPECIFIED ORGANISM: ICD-10-CM

## 2017-05-21 DIAGNOSIS — E11.9 TYPE 2 DIABETES MELLITUS WITHOUT COMPLICATIONS: ICD-10-CM

## 2017-05-21 DIAGNOSIS — E86.0 DEHYDRATION: ICD-10-CM

## 2017-05-21 DIAGNOSIS — R21 RASH AND OTHER NONSPECIFIC SKIN ERUPTION: ICD-10-CM

## 2017-05-21 DIAGNOSIS — E03.9 HYPOTHYROIDISM, UNSPECIFIED: ICD-10-CM

## 2017-05-21 DIAGNOSIS — E43 UNSPECIFIED SEVERE PROTEIN-CALORIE MALNUTRITION: ICD-10-CM

## 2017-05-21 LAB
ALBUMIN SERPL ELPH-MCNC: 0.9 G/DL — LOW (ref 3.5–5)
ALP SERPL-CCNC: 169 U/L — HIGH (ref 40–120)
ALT FLD-CCNC: 43 U/L DA — SIGNIFICANT CHANGE UP (ref 10–60)
ANION GAP SERPL CALC-SCNC: 13 MMOL/L — SIGNIFICANT CHANGE UP (ref 5–17)
APPEARANCE UR: CLEAR — SIGNIFICANT CHANGE UP
AST SERPL-CCNC: 37 U/L — SIGNIFICANT CHANGE UP (ref 10–40)
BASE EXCESS BLDV CALC-SCNC: -4.1 MMOL/L — LOW (ref -2–2)
BILIRUB SERPL-MCNC: 0.6 MG/DL — SIGNIFICANT CHANGE UP (ref 0.2–1.2)
BILIRUB UR-MCNC: NEGATIVE — SIGNIFICANT CHANGE UP
BUN SERPL-MCNC: 11 MG/DL — SIGNIFICANT CHANGE UP (ref 7–18)
CALCIUM SERPL-MCNC: 7.1 MG/DL — LOW (ref 8.4–10.5)
CHLORIDE SERPL-SCNC: 100 MMOL/L — SIGNIFICANT CHANGE UP (ref 96–108)
CK SERPL-CCNC: 48 U/L — SIGNIFICANT CHANGE UP (ref 21–215)
CO2 SERPL-SCNC: 23 MMOL/L — SIGNIFICANT CHANGE UP (ref 22–31)
COLOR SPEC: YELLOW — SIGNIFICANT CHANGE UP
CREAT SERPL-MCNC: 0.74 MG/DL — SIGNIFICANT CHANGE UP (ref 0.5–1.3)
DIFF PNL FLD: ABNORMAL
GLUCOSE SERPL-MCNC: 149 MG/DL — HIGH (ref 70–99)
GLUCOSE UR QL: NEGATIVE — SIGNIFICANT CHANGE UP
HCO3 BLDV-SCNC: 19 MMOL/L — LOW (ref 21–29)
HCT VFR BLD CALC: 20.3 % — CRITICAL LOW (ref 34.5–45)
HCT VFR BLD CALC: 23.6 % — LOW (ref 34.5–45)
HGB BLD-MCNC: 6.7 G/DL — CRITICAL LOW (ref 11.5–15.5)
HGB BLD-MCNC: 8.1 G/DL — LOW (ref 11.5–15.5)
HOROWITZ INDEX BLDV+IHG-RTO: 21 — SIGNIFICANT CHANGE UP
KETONES UR-MCNC: NEGATIVE — SIGNIFICANT CHANGE UP
LACTATE SERPL-SCNC: 6.4 MMOL/L — CRITICAL HIGH (ref 0.7–2)
LEUKOCYTE ESTERASE UR-ACNC: ABNORMAL
LYMPHOCYTES # BLD AUTO: 22 % — SIGNIFICANT CHANGE UP (ref 13–44)
MCHC RBC-ENTMCNC: 28 PG — SIGNIFICANT CHANGE UP (ref 27–34)
MCHC RBC-ENTMCNC: 28.7 PG — SIGNIFICANT CHANGE UP (ref 27–34)
MCHC RBC-ENTMCNC: 33.1 GM/DL — SIGNIFICANT CHANGE UP (ref 32–36)
MCHC RBC-ENTMCNC: 34.2 GM/DL — SIGNIFICANT CHANGE UP (ref 32–36)
MCV RBC AUTO: 83.8 FL — SIGNIFICANT CHANGE UP (ref 80–100)
MCV RBC AUTO: 84.5 FL — SIGNIFICANT CHANGE UP (ref 80–100)
MONOCYTES NFR BLD AUTO: 5 % — SIGNIFICANT CHANGE UP (ref 2–14)
NEUTROPHILS NFR BLD AUTO: 73 % — SIGNIFICANT CHANGE UP (ref 43–77)
NITRITE UR-MCNC: NEGATIVE — SIGNIFICANT CHANGE UP
PCO2 BLDV: 27 MMHG — LOW (ref 35–50)
PH BLDV: 7.46 — HIGH (ref 7.35–7.45)
PH UR: 7 — SIGNIFICANT CHANGE UP (ref 5–8)
PLATELET # BLD AUTO: 291 K/UL — SIGNIFICANT CHANGE UP (ref 150–400)
PLATELET # BLD AUTO: 357 K/UL — SIGNIFICANT CHANGE UP (ref 150–400)
PO2 BLDV: 55 MMHG — HIGH (ref 25–45)
POTASSIUM SERPL-MCNC: 3.8 MMOL/L — SIGNIFICANT CHANGE UP (ref 3.5–5.3)
POTASSIUM SERPL-SCNC: 3.8 MMOL/L — SIGNIFICANT CHANGE UP (ref 3.5–5.3)
PROT SERPL-MCNC: 5.1 G/DL — LOW (ref 6–8.3)
PROT UR-MCNC: 30 MG/DL
RBC # BLD: 2.4 M/UL — LOW (ref 3.8–5.2)
RBC # BLD: 2.81 M/UL — LOW (ref 3.8–5.2)
RBC # FLD: 17 % — HIGH (ref 10.3–14.5)
RBC # FLD: 17.3 % — HIGH (ref 10.3–14.5)
SAO2 % BLDV: 82 % — SIGNIFICANT CHANGE UP (ref 67–88)
SODIUM SERPL-SCNC: 136 MMOL/L — SIGNIFICANT CHANGE UP (ref 135–145)
SP GR SPEC: 1.01 — SIGNIFICANT CHANGE UP (ref 1.01–1.02)
UROBILINOGEN FLD QL: NEGATIVE — SIGNIFICANT CHANGE UP
WBC # BLD: 18.2 K/UL — HIGH (ref 3.8–10.5)
WBC # BLD: 24.1 K/UL — HIGH (ref 3.8–10.5)
WBC # FLD AUTO: 18.2 K/UL — HIGH (ref 3.8–10.5)
WBC # FLD AUTO: 24.1 K/UL — HIGH (ref 3.8–10.5)

## 2017-05-21 PROCEDURE — 71010: CPT | Mod: 26

## 2017-05-21 PROCEDURE — 71010: CPT | Mod: 26,77

## 2017-05-21 PROCEDURE — 99291 CRITICAL CARE FIRST HOUR: CPT

## 2017-05-21 PROCEDURE — 74176 CT ABD & PELVIS W/O CONTRAST: CPT | Mod: 26

## 2017-05-21 RX ORDER — ACETAMINOPHEN 500 MG
650 TABLET ORAL ONCE
Qty: 0 | Refills: 0 | Status: DISCONTINUED | OUTPATIENT
Start: 2017-05-21 | End: 2017-05-22

## 2017-05-21 RX ORDER — ACETAMINOPHEN 500 MG
650 TABLET ORAL EVERY 6 HOURS
Qty: 0 | Refills: 0 | Status: DISCONTINUED | OUTPATIENT
Start: 2017-05-21 | End: 2017-05-22

## 2017-05-21 RX ORDER — FERROUS SULFATE 325(65) MG
325 TABLET ORAL
Qty: 0 | Refills: 0 | Status: DISCONTINUED | OUTPATIENT
Start: 2017-05-21 | End: 2017-05-22

## 2017-05-21 RX ORDER — METRONIDAZOLE 500 MG
500 TABLET ORAL EVERY 8 HOURS
Qty: 0 | Refills: 0 | Status: DISCONTINUED | OUTPATIENT
Start: 2017-05-22 | End: 2017-05-22

## 2017-05-21 RX ORDER — AZTREONAM 2 G
1000 VIAL (EA) INJECTION ONCE
Qty: 0 | Refills: 0 | Status: COMPLETED | OUTPATIENT
Start: 2017-05-21 | End: 2017-05-21

## 2017-05-21 RX ORDER — DEXTROSE 50 % IN WATER 50 %
25 SYRINGE (ML) INTRAVENOUS ONCE
Qty: 0 | Refills: 0 | Status: DISCONTINUED | OUTPATIENT
Start: 2017-05-21 | End: 2017-05-22

## 2017-05-21 RX ORDER — AZTREONAM 2 G
1000 VIAL (EA) INJECTION EVERY 6 HOURS
Qty: 0 | Refills: 0 | Status: DISCONTINUED | OUTPATIENT
Start: 2017-05-21 | End: 2017-05-22

## 2017-05-21 RX ORDER — SIMVASTATIN 20 MG/1
40 TABLET, FILM COATED ORAL AT BEDTIME
Qty: 0 | Refills: 0 | Status: DISCONTINUED | OUTPATIENT
Start: 2017-05-21 | End: 2017-05-22

## 2017-05-21 RX ORDER — NYSTATIN CREAM 100000 [USP'U]/G
1 CREAM TOPICAL
Qty: 0 | Refills: 0 | Status: DISCONTINUED | OUTPATIENT
Start: 2017-05-21 | End: 2017-05-22

## 2017-05-21 RX ORDER — DEXTROSE 50 % IN WATER 50 %
12.5 SYRINGE (ML) INTRAVENOUS ONCE
Qty: 0 | Refills: 0 | Status: DISCONTINUED | OUTPATIENT
Start: 2017-05-21 | End: 2017-05-22

## 2017-05-21 RX ORDER — METRONIDAZOLE 500 MG
500 TABLET ORAL ONCE
Qty: 0 | Refills: 0 | Status: COMPLETED | OUTPATIENT
Start: 2017-05-21 | End: 2017-05-21

## 2017-05-21 RX ORDER — PANTOPRAZOLE SODIUM 20 MG/1
40 TABLET, DELAYED RELEASE ORAL
Qty: 0 | Refills: 0 | Status: DISCONTINUED | OUTPATIENT
Start: 2017-05-21 | End: 2017-05-22

## 2017-05-21 RX ORDER — HEPARIN SODIUM 5000 [USP'U]/ML
5000 INJECTION INTRAVENOUS; SUBCUTANEOUS EVERY 8 HOURS
Qty: 0 | Refills: 0 | Status: DISCONTINUED | OUTPATIENT
Start: 2017-05-21 | End: 2017-05-22

## 2017-05-21 RX ORDER — MORPHINE SULFATE 50 MG/1
2 CAPSULE, EXTENDED RELEASE ORAL ONCE
Qty: 0 | Refills: 0 | Status: DISCONTINUED | OUTPATIENT
Start: 2017-05-21 | End: 2017-05-21

## 2017-05-21 RX ORDER — ZINC SULFATE TAB 220 MG (50 MG ZINC EQUIVALENT) 220 (50 ZN) MG
220 TAB ORAL DAILY
Qty: 0 | Refills: 0 | Status: DISCONTINUED | OUTPATIENT
Start: 2017-05-21 | End: 2017-05-22

## 2017-05-21 RX ORDER — GLUCAGON INJECTION, SOLUTION 0.5 MG/.1ML
1 INJECTION, SOLUTION SUBCUTANEOUS ONCE
Qty: 0 | Refills: 0 | Status: DISCONTINUED | OUTPATIENT
Start: 2017-05-21 | End: 2017-05-22

## 2017-05-21 RX ORDER — DEXTROSE 50 % IN WATER 50 %
1 SYRINGE (ML) INTRAVENOUS ONCE
Qty: 0 | Refills: 0 | Status: DISCONTINUED | OUTPATIENT
Start: 2017-05-21 | End: 2017-05-22

## 2017-05-21 RX ORDER — VANCOMYCIN HCL 1 G
125 VIAL (EA) INTRAVENOUS EVERY 6 HOURS
Qty: 0 | Refills: 0 | Status: DISCONTINUED | OUTPATIENT
Start: 2017-05-21 | End: 2017-05-22

## 2017-05-21 RX ORDER — HYDROCORTISONE 20 MG
50 TABLET ORAL ONCE
Qty: 0 | Refills: 0 | Status: COMPLETED | OUTPATIENT
Start: 2017-05-21 | End: 2017-05-21

## 2017-05-21 RX ORDER — ALBUMIN HUMAN 25 %
50 VIAL (ML) INTRAVENOUS EVERY 6 HOURS
Qty: 0 | Refills: 0 | Status: DISCONTINUED | OUTPATIENT
Start: 2017-05-21 | End: 2017-05-22

## 2017-05-21 RX ORDER — SODIUM CHLORIDE 9 MG/ML
1000 INJECTION, SOLUTION INTRAVENOUS
Qty: 0 | Refills: 0 | Status: DISCONTINUED | OUTPATIENT
Start: 2017-05-21 | End: 2017-05-22

## 2017-05-21 RX ORDER — INSULIN LISPRO 100/ML
VIAL (ML) SUBCUTANEOUS
Qty: 0 | Refills: 0 | Status: DISCONTINUED | OUTPATIENT
Start: 2017-05-21 | End: 2017-05-22

## 2017-05-21 RX ORDER — ASCORBIC ACID 60 MG
500 TABLET,CHEWABLE ORAL DAILY
Qty: 0 | Refills: 0 | Status: DISCONTINUED | OUTPATIENT
Start: 2017-05-21 | End: 2017-05-22

## 2017-05-21 RX ORDER — NOREPINEPHRINE BITARTRATE/D5W 8 MG/250ML
0.05 PLASTIC BAG, INJECTION (ML) INTRAVENOUS
Qty: 8 | Refills: 0 | Status: DISCONTINUED | OUTPATIENT
Start: 2017-05-21 | End: 2017-05-22

## 2017-05-21 RX ORDER — ASPIRIN/CALCIUM CARB/MAGNESIUM 324 MG
81 TABLET ORAL DAILY
Qty: 0 | Refills: 0 | Status: DISCONTINUED | OUTPATIENT
Start: 2017-05-21 | End: 2017-05-22

## 2017-05-21 RX ORDER — LEVOTHYROXINE SODIUM 125 MCG
25 TABLET ORAL DAILY
Qty: 0 | Refills: 0 | Status: DISCONTINUED | OUTPATIENT
Start: 2017-05-21 | End: 2017-05-22

## 2017-05-21 RX ORDER — VANCOMYCIN HCL 1 G
1000 VIAL (EA) INTRAVENOUS EVERY 12 HOURS
Qty: 0 | Refills: 0 | Status: DISCONTINUED | OUTPATIENT
Start: 2017-05-21 | End: 2017-05-22

## 2017-05-21 RX ORDER — TRAZODONE HCL 50 MG
50 TABLET ORAL AT BEDTIME
Qty: 0 | Refills: 0 | Status: DISCONTINUED | OUTPATIENT
Start: 2017-05-21 | End: 2017-05-22

## 2017-05-21 RX ORDER — VANCOMYCIN HCL 1 G
1000 VIAL (EA) INTRAVENOUS ONCE
Qty: 0 | Refills: 0 | Status: DISCONTINUED | OUTPATIENT
Start: 2017-05-21 | End: 2017-05-21

## 2017-05-21 RX ORDER — METRONIDAZOLE 500 MG
TABLET ORAL
Qty: 0 | Refills: 0 | Status: DISCONTINUED | OUTPATIENT
Start: 2017-05-21 | End: 2017-05-22

## 2017-05-21 RX ORDER — FOLIC ACID 0.8 MG
1 TABLET ORAL DAILY
Qty: 0 | Refills: 0 | Status: DISCONTINUED | OUTPATIENT
Start: 2017-05-21 | End: 2017-05-22

## 2017-05-21 RX ORDER — SODIUM CHLORIDE 9 MG/ML
2000 INJECTION INTRAMUSCULAR; INTRAVENOUS; SUBCUTANEOUS ONCE
Qty: 0 | Refills: 0 | Status: COMPLETED | OUTPATIENT
Start: 2017-05-21 | End: 2017-05-21

## 2017-05-21 RX ORDER — LACTOBACILLUS ACIDOPHILUS 100MM CELL
1 CAPSULE ORAL
Qty: 0 | Refills: 0 | Status: DISCONTINUED | OUTPATIENT
Start: 2017-05-21 | End: 2017-05-22

## 2017-05-21 RX ADMIN — Medication 50 MILLIGRAM(S): at 23:05

## 2017-05-21 RX ADMIN — MORPHINE SULFATE 2 MILLIGRAM(S): 50 CAPSULE, EXTENDED RELEASE ORAL at 19:00

## 2017-05-21 RX ADMIN — Medication 5.95 MICROGRAM(S)/KG/MIN: at 22:12

## 2017-05-21 RX ADMIN — SODIUM CHLORIDE 2000 MILLILITER(S): 9 INJECTION INTRAMUSCULAR; INTRAVENOUS; SUBCUTANEOUS at 18:30

## 2017-05-21 RX ADMIN — Medication 250 MILLIGRAM(S): at 23:05

## 2017-05-21 RX ADMIN — Medication 100 MILLIGRAM(S): at 22:39

## 2017-05-21 RX ADMIN — MORPHINE SULFATE 2 MILLIGRAM(S): 50 CAPSULE, EXTENDED RELEASE ORAL at 18:34

## 2017-05-21 NOTE — ED PROVIDER NOTE - OBJECTIVE STATEMENT
76 y/o F pt with PMHx of anemia, DM, [facility] to the ED c/o diffuse rash over the body x 1 week, dehydration and mouth sores x 2 days. As per daughter, pt has been unable to eat secondary to the mouth discomfort. Daughter states the pt had a prolonged septic infection for many months during the pt's time in [place]. Pt was being treated with Flagyl for an unknown infection. While over in [place], the pt developed sacral decubitus and rashes, which has progressively worsened with time. Daughter denies pt having fever, chills, nausea, vomiting, diarrhea, SOB, CP, palpitations, dysuria, urinary frequency, hematuria, drainage/pus/bleeding from bedsores, or any other complaints. NKDA. 78 y/o F pt with PMHx of anemia, DM, [facility] c/o diffuse rash over the body x 1 week, dehydration and mouth sores x 2 days. As per daughter, pt has been unable to eat secondary to the mouth discomfort. Daughter states the pt had a prolonged septic infection for many months during the pt's time in [facility], and was hospitalized in [place] in 01/2017 for sepsis. Pt was being treated with Flagyl for an unknown infection. While over in [place], the pt developed sacral decubitus and rashes, which has progressively worsened with time. Daughter denies pt having fever, chills, nausea, vomiting, diarrhea, SOB, CP, palpitations, dysuria, urinary frequency, hematuria, drainage/pus/bleeding from bedsores, or any other complaints. NKDA.

## 2017-05-21 NOTE — H&P ADULT - NSHPREVIEWOFSYSTEMS_GEN_ALL_CORE
septic shock secondary to c diff vs UTI. Unlikely osteomyelitis as patient was on wound vac with improved healing of stage IV sacral ulcer. No purulent discharge noted from ulcer. UA sent from chronic muniz on admission was positive with WBC 11-25. Will change muniz catheter and obtain UA from new catheter. Patient required emergent central line placement in the ED due to persistent hypotension and poor access (only access was IO in right leg).   -continue on levophed to maintain MAP>65  -continue albumin  -follow up repeat UA from new catheter  -follow up BC, UC  -follow up ID recommendations, Dr. Weber  -follow up c diff   -continue vancomycin PO given severe c diff   -continue vancomycin IV, azactam, and flagyl for coverage of possible osteomyelitis of sacral stage IV ulcer  -PMD, Dr. Berman contacted who confirmed she was recently started on empiric treatment for c diff. No cdiff sample was sent at that time  -continue contact isolation until c diff ruled out  -follow up CT abdomen/pelvis given RLQ pain (unable to obtain with contrast given poor access, can not inject via central line)    severe protein calorie malnutrition  -tsh, cortisol, prealbumin  -continue albumin REVIEW OF SYSTEMS:  CONSTITUTIONAL: chills, No weakness, fevers   EYES/ENT: No visual changes;  No vertigo or throat pain   NECK: No pain or stiffness  RESPIRATORY: No cough, wheezing, hemoptysis; No shortness of breath  CARDIOVASCULAR: No chest pain or palpitations  GASTROINTESTINAL: abdominal tenderness, loose stools. No nausea, vomiting, or hematemesis; No  constipation. No melena or hematochezia.  GENITOURINARY: chronic muniz, No dysuria, frequency or hematuria  NEUROLOGICAL: No numbness or weakness  SKIN: Burning groin/ lower abdominal rash and lower extremity lesions   All other review of systems is negative unless indicated above. REVIEW OF SYSTEMS: limited as pt is confused and can not provide accurate history.  CONSTITUTIONAL: chills, No weakness, fevers   EYES/ENT: No visual changes;  No vertigo or throat pain   NECK: No pain or stiffness  RESPIRATORY: No cough, wheezing, hemoptysis; No shortness of breath  CARDIOVASCULAR: No chest pain or palpitations  GASTROINTESTINAL: abdominal tenderness, loose stools. No nausea, vomiting, or hematemesis; No  constipation. No melena or hematochezia.  GENITOURINARY: chronic muniz, No dysuria, frequency or hematuria  NEUROLOGICAL: No numbness or weakness  SKIN: Burning groin/ lower abdominal rash and lower extremity lesions   All other review of systems is negative unless indicated above.

## 2017-05-21 NOTE — ED ADULT NURSE NOTE - OBJECTIVE STATEMENT
With muniz FR14, Breathing on %. Placed on  bpm sinus tach. Presented to ED BIBA from [facility]. Patient nonverbal. Per family, "she used to speak a lot more before but last friday she barely spoke." Noted with hypotension. With generalized rashes and decubitus ulcers to sacrum and left ankle. With muniz FR14, Breathing on %. Placed on  bpm sinus tach. Patient also not eating well per family.

## 2017-05-21 NOTE — H&P ADULT - PROBLEM SELECTOR PLAN 1
septic shock secondary to c diff vs UTI. Unlikely osteomyelitis as patient was on wound vac with improved healing of stage IV sacral ulcer. No purulent discharge noted from ulcer. UA sent from chronic muniz on admission was positive with WBC 11-25. Will change muniz catheter and obtain UA from new catheter. Patient required emergent central line placement in the ED due to persistent hypotension and poor access (only access was IO in right leg).   -continue on levophed to maintain MAP>65  -continue albumin  -follow up repeat UA from new catheter  -follow up BC, UC  -follow up ID recommendations, Dr. Weber  -follow up c diff   -continue vancomycin PO given severe c diff   -continue vancomycin IV, azactam, and flagyl for coverage of possible osteomyelitis of sacral stage IV ulcer  -PMD, Dr. Berman contacted who confirmed she was recently started on empiric treatment for c diff. No cdiff sample was sent at that time  -continue contact isolation until c diff ruled out  -continue bacid  -follow up CT abdomen/pelvis given RLQ pain (unable to obtain with contrast given poor access, can not inject via central line)

## 2017-05-21 NOTE — ED PROVIDER NOTE - NS ED MD SCRIBE ATTENDING SCRIBE SECTIONS
PAST MEDICAL/SURGICAL/SOCIAL HISTORY/HIV/REVIEW OF SYSTEMS/VITAL SIGNS( Pullset)/PHYSICAL EXAM/DISPOSITION/HISTORY OF PRESENT ILLNESS

## 2017-05-21 NOTE — H&P ADULT - PROBLEM SELECTOR PLAN 2
Patient with history of stage IV sacral ulcer with wound vac in place on admission (removed by ED for evaluation of possible source of infection). No evidence of active infection give no purulent discharge.  -follow up surgery for wound vac recommendations, Dr. Riley  -avtar muniz for improved wound healing

## 2017-05-21 NOTE — PROCEDURE NOTE - NSPOSTCAREGUIDE_GEN_A_CORE
Verbal/written post procedure instructions were given to patient/caregiver/Instructed patient/caregiver to follow-up with primary care physician/Instructed patient/caregiver regarding signs and symptoms of infection

## 2017-05-21 NOTE — H&P ADULT - PROBLEM SELECTOR PLAN 8
Patient with history of iron deficiency anemia (baseline hemoglobin 8 in March 2017).   -continue iron supplementation

## 2017-05-21 NOTE — H&P ADULT - ATTENDING COMMENTS
77 year old female with h/o Dementia, HTN, IDDM, severe protein malnutrition, large sacrad stage 4 decub with chronic muniz and wound vac, hypothyroid, iron def anemia, recurrent hospitalizations due to multiple infections who p/w rash from NH. in ED received IVF and became hypotensive. CVP line placed to start pressers. pt recently being empirically treated for cdiff again and currently in flagyl   rash appears more fungal in intertriginous areas. ? oral mucosal lesions as per ED MD, but pt does not allow exam of oral mucosa for me to evaluate  properly.     case d/w DTR bedside      Assessment  Septic shock ? UTI vs bacteremia vs cdiff / colitis  Rash suspect due to fungal intertriginous dermatitis   Severe protein malnutrition  Lactic acidosis  Large sacral decub , does not appear infected at this time  dementia  Hypothyroid  DM2     Plan  Admit to ICU  Pressers, albumin  Broad spectrum abx  ID, Surg/Wound Eval  Cdiff isolation - as pt already with cdiff history resending PCR will be likely positive  repeat UA with muniz  repeat lactic/CE  Echo  Palliative/nutrition eval  prognosis is poor

## 2017-05-21 NOTE — H&P ADULT - NSHPPHYSICALEXAM_GEN_ALL_CORE
General: elderly female in no acute distress   HEENT: PERRL, Supple neck, NO JVD  Cardiac: S1 S2, No M/R/G  Pulmonary: CTAB, Breathing unlabored, No Rhonchi/Rales/Wheezing  Abdomen: Soft, tender to right lower quadrant, nondistended, +BS x 4 quads  Extremities: bilateral lower extremity skin excoriations, no surrounding erythema or discharge. Moist fungal rash around grain and lower abdomen. Bilateral lower extremity edema   Neuro: Alert and awake, lethargic, confused, not verbal upon time of admission, No focal deficits General: elderly female in no acute distress   HEENT: PERRL, Supple neck, NO JVD  Cardiac: S1 S2, No M/R/G  Pulmonary: CTAB, Breathing unlabored, No Rhonchi/Rales/Wheezing  Abdomen: Soft, tender to right lower quadrant, nondistended, +BS x 4 quads  Extremities: bilateral lower extremity skin excoriations, no surrounding erythema or discharge. Moist fungal rash around grain and lower abdomen. Bilateral lower extremity edema 2+, right lower extremity IO line in place  Neuro: Alert and awake, lethargic, confused, not verbal upon time of admission, No focal deficits General: elderly female in no acute distress   HEENT: PERRL, Supple neck, NO JVD, does not open mouth to allow evaluation of oral mucosa  Cardiac: S1 S2, No M/R/G  Pulmonary: CTAB, Breathing unlabored, No Rhonchi/Rales/Wheezing  Abdomen: Soft, tender to right lower quadrant, nondistended, +BS x 4 quads  Extremities: bilateral lower extremity skin excoriations, no surrounding erythema or discharge. Moist fungal rash around grain and lower abdomen. Bilateral lower extremity edema 2+, right lower extremity IO line in place  Neuro: Alert and awake, lethargic, confused, not verbal upon time of admission, No focal deficits  - muniz with yellow cloudy   Sacrum - large sacral decub

## 2017-05-21 NOTE — ED PROVIDER NOTE - SKIN, MLM
Stage 4 sacral decubitus without pus, erythema, or drainage. Skin tenting. Rash on b/l inner thighs, groins, lower and upper extremities, back and abd.

## 2017-05-21 NOTE — ED PROVIDER NOTE - ENMT, MLM
Airway patent, Nasal mucosa clear. Mouth with dry mucosa, with multiple oral mucosal ulcerations. Throat has no vesicles, no oropharyngeal exudates and uvula is midline.

## 2017-05-21 NOTE — ED ADULT TRIAGE NOTE - CHIEF COMPLAINT QUOTE
notification from [facility] for hypotension and FTT  mouth sores, groin sores, pressure ulcer notification from [facility] for hypotension and FTT  mouth sores, groin sores, pressure ulcer, Howell cath

## 2017-05-21 NOTE — PROCEDURE NOTE - NSPROCDETAILS_GEN_ALL_CORE
sterile dressing applied/lumen(s) aspirated and flushed/sterile technique, catheter placed/ultrasound guidance/guidewire recovered

## 2017-05-21 NOTE — ED PROVIDER NOTE - CHPI ED SYMPTOMS NEG
no fever/no vomiting/no chills, no nausea, no diarrhea, no SOB, no CP, no palpitations, no dysuria, no urinary frequency, no hematuria, no drainage, no pus, no bleeding

## 2017-05-21 NOTE — ED PROVIDER NOTE - CRITICAL CARE PROVIDED
interpretation of diagnostic studies/conducted a detailed discussion of DNR status/direct patient care (not related to procedure)/additional history taking/consultation with other physicians/documentation/consult w/ pt's family directly relating to pts condition

## 2017-05-21 NOTE — ED PROVIDER NOTE - MEDICAL DECISION MAKING DETAILS
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 No signs or symptoms of an acute infection, although pt with mild to moderate dehydration. Will check labs, give IVF, CXR, EKG, and like admit. 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 No signs or symptoms of an acute infection, although pt with mild to moderate dehydration. Will check labs, give IVF, CXR, EKG, and like admit.

## 2017-05-21 NOTE — H&P ADULT - PROBLEM SELECTOR PLAN 3
Patient with history of chronic muniz. Initial UA from muniz with WBC 11-25. Will repeat after muniz catheter is replaced.   -follow up repeat UA. If positive will send UC give concern for UTI Patient with history of chronic muniz. Initial UA from muniz with WBC 11-25. Will repeat after muniz catheter is replaced.   -follow up repeat UA. If positive will send urine culture give concern for UTI

## 2017-05-21 NOTE — H&P ADULT - NSHPLABSRESULTS_GEN_ALL_CORE
8.1    24.1  )-----------( 357      ( 21 May 2017 18:28 )             23.6           136  |  100  |  11  ----------------------------<  149<H>  3.8   |  23  |  0.74    Ca    7.1<L>      21 May 2017 18:28    TPro  5.1<L>  /  Alb  0.9<L>  /  TBili  0.6  /  DBili  x   /  AST  37  /  ALT  43  /  AlkPhos  169<H>                Urinalysis Basic - ( 21 May 2017 18:28 )    Color: Yellow / Appearance: Clear / S.010 / pH: x  Gluc: x / Ketone: Negative  / Bili: Negative / Urobili: Negative   Blood: x / Protein: 30 mg/dL / Nitrite: Negative   Leuk Esterase: Moderate / RBC: 10-25 /HPF / WBC 11-25 /HPF   Sq Epi: x / Non Sq Epi: Few / Bacteria: Moderate /HPF            Lactate Trend   @ 19:45 Lactate:6.4       CARDIAC MARKERS ( 21 May 2017 18:28 )  x     / x     / 48 U/L / x     / x            CAPILLARY BLOOD GLUCOSE

## 2017-05-21 NOTE — H&P ADULT - PMH
Anemia    Dementia without behavioral disturbance, unspecified dementia type    DM (diabetes mellitus)    Hypothyroidism, unspecified type    Skin ulcer of sacrum with necrosis of muscle Anemia    Dementia without behavioral disturbance, unspecified dementia type    DM (diabetes mellitus)    Hypothyroidism, unspecified type    Malnutrition, calorie  severe protein malnutrition.  Skin ulcer of sacrum with necrosis of muscle

## 2017-05-21 NOTE — H&P ADULT - HISTORY OF PRESENT ILLNESS
77 year old female from [facility] with PMH of dementia, HTN, IDDM, chronic iron deficiency anemia (baseline hemoglobin 8), and recurrent hospitalizations over the last 6 months due to multiple infections including stage IV sacral decubitus ulcer (on wound vac, s/p treatment with tigecycline and fluconazole for ESBL Ecoli, E. faecalis, and candida) requiring chronic muniz, and GI bleed secondary to c diff colitis (s/p treatment with vancomycin, then empiric treatment with flagyl) presented with complaint of worsening rash from nursing home. The patient was unable to provide history or ROS, all history was obtained from her daughter at bedside. As per her daughter Alejandrina Chacon ([phone]), she was discharged from 77 year old female from [facility] with PMH of dementia, HTN, IDDM, chronic iron deficiency anemia (baseline hemoglobin 8), and recurrent hospitalizations over the last 6 months due to multiple infections including stage IV sacral decubitus ulcer (on wound vac, s/p treatment with tigecycline and fluconazole for ESBL Ecoli, E. faecalis, and candida) requiring chronic muniz, and GI bleed secondary to c diff colitis (s/p treatment with vancomycin, then empiric treatment with flagyl) presented with complaint of worsening rash from nursing home. The patient was unable to provide history or ROS, all history was obtained from her daughter at bedside. As per her daughter Alejandrina Chacon ([phone]), she was [facility]/17 after treatment for c diff colitis and infected sacral stage IV requiring wound vac. 1 week ago she developed who body rash, worse around the groin and lower abdomen. Her daughter also reported mouth ulcer and poor oral intake for the last 3 days. She reported associated chills but denied fever, shortness of breath, cough, chest pain, palpitations, nausea, vomiting, or urinary symptoms.     In the ED, the patient became hypotensive to 90/50 after 2L bolus. The patient's blood pressure dropped to 75/50 and emergent right IJ was placed. She received 1 dose of vancomycin and azactam in the ED.   Goals of care were discussed with the patient's daughter who wants full code despite understanding of poor prognosis given multiple hospitalizations in recent months.

## 2017-05-21 NOTE — H&P ADULT - PROBLEM SELECTOR PLAN 4
Patient had presented with skin rash, likely secondary to fungal rash along lower abdomen and groin.   -continue nystatin powder  Patient with evidence of skin excoriation along lower extremities. No evidence of SJS given no erythema or discharge.  -patient with oral mucosal ulcers, no evidence of fungal infection. Possibly related to vitamin C deficiency? '  -continue vitamin C supplementation

## 2017-05-21 NOTE — H&P ADULT - ASSESSMENT
77 year old female from [facility] with PMH of dementia, HTN, IDDM, chronic iron deficiency anemia (baseline hemoglobin 8), and recurrent hospitalizations over the last 6 months due to multiple infections including stage IV sacral decubitus ulcer (on wound vac, s/p treatment with tigecycline and fluconazole for ESBL Ecoli, E. faecalis, and candida) requiring chronic muniz, and GI bleed secondary to c diff colitis (s/p treatment with vancomycin, then empiric treatment with flagyl) presented with complaint of worsening rash from nursing home.     In the ED, the patient became hypotensive to 90/50 after 2L bolus. The patient's blood pressure dropped to 75/50 and emergent right IJ was placed. She received 1 dose of vancomycin and azactam in the ED.   Goals of care were discussed with the patient's daughter who wants full code despite understanding of poor prognosis given multiple hospitalizations in recent months.     septic shock secondary to c diff vs UTI. Unlikely osteomyelitis as patient was on wound vac with improved healing of stage IV sacral ulcer. No purulent discharge noted from ulcer. UA sent from chronic muniz on admission was positive with WBC 11-25. Will change muniz catheter and obtain UA from new catheter. Patient required emergent central line placement in the ED due to persistent hypotension and poor access (only access was IO in right leg).   -continue on levophed to maintain MAP>65  -continue albumin  -follow up repeat UA from new catheter  -follow up BC, UC  -follow up ID recommendations, Dr. Weber  -follow up c diff   -continue vancomycin PO given severe c diff   -continue vancomycin IV, azactam, and flagyl for coverage of possible osteomyelitis of sacral stage IV ulcer  -PMD, Dr. Berman contacted who confirmed she was recently started on empiric treatment for c diff. No cdiff sample was sent at that time  -continue contact isolation until c diff ruled out  -follow up CT abdomen/pelvis given RLQ pain (unable to obtain with contrast given poor access, can not inject via central line)    severe protein calorie malnutrition  -tsh, cortisol, prealbumin  -continue albumin    stage IV sacral ulcer  -follow up surgical consult  -will likely need continued wound vac (removed in ED) 77 year old female from [facility] with PMH of dementia, HTN, IDDM, chronic iron deficiency anemia (baseline hemoglobin 8), and recurrent hospitalizations over the last 6 months due to multiple infections including stage IV sacral decubitus ulcer (on wound vac, s/p treatment with tigecycline and fluconazole for ESBL Ecoli, E. faecalis, and candida) requiring chronic muniz, and GI bleed secondary to c diff colitis (s/p treatment with vancomycin, then empiric treatment with flagyl) presented with complaint of worsening rash from nursing home.     In the ED, the patient became hypotensive to 90/50 after 2L bolus. Lactate was elevated to 6.4. Patient with leukocytosis of 24.1.The patient's blood pressure dropped to 75/50 and emergent right IJ was placed. She received 1 dose of vancomycin and azactam in the ED.   Goals of care were discussed with the patient's daughter who wants full code despite understanding of poor prognosis given multiple hospitalizations in recent months.

## 2017-05-21 NOTE — H&P ADULT - PROBLEM SELECTOR PLAN 5
severe protein calorie malnutrition  -tsh, cortisol, prealbumin  -continue albumin  -continue multivitamin, vitamin c, folic acid   -follow up nutrition consult. Will continue severe protein calorie malnutrition given albumin 0.7  -tsh, cortisol, prealbumin  -continue albumin  -continue multivitamin, vitamin c, folic acid   -follow up nutrition consult. Will continue

## 2017-05-22 VITALS — RESPIRATION RATE: 27 BRPM | DIASTOLIC BLOOD PRESSURE: 86 MMHG | SYSTOLIC BLOOD PRESSURE: 156 MMHG

## 2017-05-22 LAB
ABO RH CONFIRMATION: SIGNIFICANT CHANGE UP
ANION GAP SERPL CALC-SCNC: 11 MMOL/L — SIGNIFICANT CHANGE UP (ref 5–17)
ANION GAP SERPL CALC-SCNC: 15 MMOL/L — SIGNIFICANT CHANGE UP (ref 5–17)
BASE EXCESS BLDA CALC-SCNC: -17.4 MMOL/L — LOW (ref -2–2)
BLOOD GAS COMMENTS ARTERIAL: SIGNIFICANT CHANGE UP
BUN SERPL-MCNC: 10 MG/DL — SIGNIFICANT CHANGE UP (ref 7–18)
BUN SERPL-MCNC: 11 MG/DL — SIGNIFICANT CHANGE UP (ref 7–18)
CALCIUM SERPL-MCNC: 6.5 MG/DL — CRITICAL LOW (ref 8.4–10.5)
CALCIUM SERPL-MCNC: 6.7 MG/DL — LOW (ref 8.4–10.5)
CHLORIDE SERPL-SCNC: 102 MMOL/L — SIGNIFICANT CHANGE UP (ref 96–108)
CHLORIDE SERPL-SCNC: 106 MMOL/L — SIGNIFICANT CHANGE UP (ref 96–108)
CK MB BLD-MCNC: 6.9 % — HIGH (ref 0–3.5)
CK MB BLD-MCNC: 7.2 % — HIGH (ref 0–3.5)
CK MB CFR SERPL CALC: 2.8 NG/ML — SIGNIFICANT CHANGE UP (ref 0–3.6)
CK MB CFR SERPL CALC: 3.4 NG/ML — SIGNIFICANT CHANGE UP (ref 0–3.6)
CK SERPL-CCNC: 39 U/L — SIGNIFICANT CHANGE UP (ref 21–215)
CK SERPL-CCNC: 49 U/L — SIGNIFICANT CHANGE UP (ref 21–215)
CO2 SERPL-SCNC: 18 MMOL/L — LOW (ref 22–31)
CO2 SERPL-SCNC: 22 MMOL/L — SIGNIFICANT CHANGE UP (ref 22–31)
CREAT SERPL-MCNC: 0.56 MG/DL — SIGNIFICANT CHANGE UP (ref 0.5–1.3)
CREAT SERPL-MCNC: 0.76 MG/DL — SIGNIFICANT CHANGE UP (ref 0.5–1.3)
CULTURE RESULTS: SIGNIFICANT CHANGE UP
GLUCOSE SERPL-MCNC: 132 MG/DL — HIGH (ref 70–99)
GLUCOSE SERPL-MCNC: 186 MG/DL — HIGH (ref 70–99)
HCO3 BLDA-SCNC: 8 MMOL/L — LOW (ref 23–27)
HCT VFR BLD CALC: 25.4 % — LOW (ref 34.5–45)
HGB BLD-MCNC: 9.1 G/DL — LOW (ref 11.5–15.5)
HOROWITZ INDEX BLDA+IHG-RTO: SIGNIFICANT CHANGE UP
LACTATE SERPL-SCNC: 11.3 MMOL/L — CRITICAL HIGH (ref 0.7–2)
LACTATE SERPL-SCNC: 5.7 MMOL/L — CRITICAL HIGH (ref 0.7–2)
MAGNESIUM SERPL-MCNC: 1.7 MG/DL — SIGNIFICANT CHANGE UP (ref 1.6–2.6)
MCHC RBC-ENTMCNC: 27.8 PG — SIGNIFICANT CHANGE UP (ref 27–34)
MCHC RBC-ENTMCNC: 35.7 GM/DL — SIGNIFICANT CHANGE UP (ref 32–36)
MCV RBC AUTO: 77.8 FL — LOW (ref 80–100)
PCO2 BLDA: 15 MMHG — LOW (ref 32–46)
PH BLDA: 7.32 — LOW (ref 7.35–7.45)
PHOSPHATE SERPL-MCNC: 1.8 MG/DL — LOW (ref 2.5–4.5)
PLATELET # BLD AUTO: 259 K/UL — SIGNIFICANT CHANGE UP (ref 150–400)
PO2 BLDA: 104 MMHG — SIGNIFICANT CHANGE UP (ref 74–108)
POTASSIUM SERPL-MCNC: 3.4 MMOL/L — LOW (ref 3.5–5.3)
POTASSIUM SERPL-MCNC: 4.6 MMOL/L — SIGNIFICANT CHANGE UP (ref 3.5–5.3)
POTASSIUM SERPL-SCNC: 3.4 MMOL/L — LOW (ref 3.5–5.3)
POTASSIUM SERPL-SCNC: 4.6 MMOL/L — SIGNIFICANT CHANGE UP (ref 3.5–5.3)
PREALB SERPL-MCNC: <2 MG/DL — SIGNIFICANT CHANGE UP (ref 18–45)
RBC # BLD: 3.26 M/UL — LOW (ref 3.8–5.2)
RBC # FLD: 21.1 % — HIGH (ref 10.3–14.5)
SAO2 % BLDA: 97 % — HIGH (ref 92–96)
SODIUM SERPL-SCNC: 135 MMOL/L — SIGNIFICANT CHANGE UP (ref 135–145)
SODIUM SERPL-SCNC: 139 MMOL/L — SIGNIFICANT CHANGE UP (ref 135–145)
SPECIMEN SOURCE: SIGNIFICANT CHANGE UP
TROPONIN I SERPL-MCNC: 0.49 NG/ML — HIGH (ref 0–0.04)
TROPONIN I SERPL-MCNC: 0.59 NG/ML — HIGH (ref 0–0.04)
TSH SERPL-MCNC: 5.69 UU/ML — HIGH (ref 0.34–4.82)
WBC # BLD: 21.8 K/UL — HIGH (ref 3.8–10.5)
WBC # FLD AUTO: 21.8 K/UL — HIGH (ref 3.8–10.5)

## 2017-05-22 PROCEDURE — 96375 TX/PRO/DX INJ NEW DRUG ADDON: CPT

## 2017-05-22 PROCEDURE — 83605 ASSAY OF LACTIC ACID: CPT

## 2017-05-22 PROCEDURE — 80053 COMPREHEN METABOLIC PANEL: CPT

## 2017-05-22 PROCEDURE — 99291 CRITICAL CARE FIRST HOUR: CPT | Mod: 25

## 2017-05-22 PROCEDURE — 84100 ASSAY OF PHOSPHORUS: CPT

## 2017-05-22 PROCEDURE — 84443 ASSAY THYROID STIM HORMONE: CPT

## 2017-05-22 PROCEDURE — 96374 THER/PROPH/DIAG INJ IV PUSH: CPT

## 2017-05-22 PROCEDURE — 83036 HEMOGLOBIN GLYCOSYLATED A1C: CPT

## 2017-05-22 PROCEDURE — 87040 BLOOD CULTURE FOR BACTERIA: CPT

## 2017-05-22 PROCEDURE — 71045 X-RAY EXAM CHEST 1 VIEW: CPT

## 2017-05-22 PROCEDURE — 83735 ASSAY OF MAGNESIUM: CPT

## 2017-05-22 PROCEDURE — 87086 URINE CULTURE/COLONY COUNT: CPT

## 2017-05-22 PROCEDURE — 84484 ASSAY OF TROPONIN QUANT: CPT

## 2017-05-22 PROCEDURE — 36430 TRANSFUSION BLD/BLD COMPNT: CPT

## 2017-05-22 PROCEDURE — 86900 BLOOD TYPING SEROLOGIC ABO: CPT

## 2017-05-22 PROCEDURE — 82550 ASSAY OF CK (CPK): CPT

## 2017-05-22 PROCEDURE — P9047: CPT

## 2017-05-22 PROCEDURE — 82803 BLOOD GASES ANY COMBINATION: CPT

## 2017-05-22 PROCEDURE — 85027 COMPLETE CBC AUTOMATED: CPT

## 2017-05-22 PROCEDURE — 86901 BLOOD TYPING SEROLOGIC RH(D): CPT

## 2017-05-22 PROCEDURE — 80048 BASIC METABOLIC PNL TOTAL CA: CPT

## 2017-05-22 PROCEDURE — 81001 URINALYSIS AUTO W/SCOPE: CPT

## 2017-05-22 PROCEDURE — 82553 CREATINE MB FRACTION: CPT

## 2017-05-22 PROCEDURE — 86850 RBC ANTIBODY SCREEN: CPT

## 2017-05-22 PROCEDURE — 74176 CT ABD & PELVIS W/O CONTRAST: CPT

## 2017-05-22 PROCEDURE — P9040: CPT

## 2017-05-22 PROCEDURE — 93005 ELECTROCARDIOGRAM TRACING: CPT

## 2017-05-22 PROCEDURE — 86920 COMPATIBILITY TEST SPIN: CPT

## 2017-05-22 PROCEDURE — 84134 ASSAY OF PREALBUMIN: CPT

## 2017-05-22 RX ORDER — ALTEPLASE 100 MG
50 KIT INTRAVENOUS ONCE
Qty: 0 | Refills: 0 | Status: DISCONTINUED | OUTPATIENT
Start: 2017-05-22 | End: 2017-05-22

## 2017-05-22 RX ORDER — ALTEPLASE 100 MG
100 KIT INTRAVENOUS ONCE
Qty: 0 | Refills: 0 | Status: DISCONTINUED | OUTPATIENT
Start: 2017-05-22 | End: 2017-05-22

## 2017-05-22 RX ORDER — SODIUM BICARBONATE 1 MEQ/ML
50 SYRINGE (ML) INTRAVENOUS ONCE
Qty: 0 | Refills: 0 | Status: COMPLETED | OUTPATIENT
Start: 2017-05-22 | End: 2017-05-22

## 2017-05-22 RX ORDER — ALTEPLASE 100 MG
90 KIT INTRAVENOUS ONCE
Qty: 0 | Refills: 0 | Status: DISCONTINUED | OUTPATIENT
Start: 2017-05-22 | End: 2017-05-22

## 2017-05-22 RX ORDER — SODIUM CHLORIDE 9 MG/ML
1000 INJECTION INTRAMUSCULAR; INTRAVENOUS; SUBCUTANEOUS ONCE
Qty: 0 | Refills: 0 | Status: COMPLETED | OUTPATIENT
Start: 2017-05-22 | End: 2017-05-22

## 2017-05-22 RX ORDER — PHENYLEPHRINE HYDROCHLORIDE 10 MG/ML
0.5 INJECTION INTRAVENOUS
Qty: 160 | Refills: 0 | Status: DISCONTINUED | OUTPATIENT
Start: 2017-05-22 | End: 2017-05-22

## 2017-05-22 RX ORDER — EPINEPHRINE 0.3 MG/.3ML
0.5 INJECTION INTRAMUSCULAR; SUBCUTANEOUS
Qty: 4 | Refills: 0 | Status: DISCONTINUED | OUTPATIENT
Start: 2017-05-22 | End: 2017-05-22

## 2017-05-22 RX ORDER — VECURONIUM BROMIDE 20 MG/1
6 INJECTION, POWDER, FOR SOLUTION INTRAVENOUS ONCE
Qty: 0 | Refills: 0 | Status: DISCONTINUED | OUTPATIENT
Start: 2017-05-22 | End: 2017-05-22

## 2017-05-22 RX ORDER — KETAMINE HYDROCHLORIDE 100 MG/ML
30 INJECTION INTRAMUSCULAR; INTRAVENOUS ONCE
Qty: 0 | Refills: 0 | Status: DISCONTINUED | OUTPATIENT
Start: 2017-05-22 | End: 2017-05-22

## 2017-05-22 RX ORDER — ALTEPLASE 100 MG
10 KIT INTRAVENOUS ONCE
Qty: 0 | Refills: 0 | Status: DISCONTINUED | OUTPATIENT
Start: 2017-05-22 | End: 2017-05-22

## 2017-05-22 RX ORDER — EPINEPHRINE 0.3 MG/.3ML
0.1 INJECTION INTRAMUSCULAR; SUBCUTANEOUS
Qty: 4 | Refills: 0 | Status: DISCONTINUED | OUTPATIENT
Start: 2017-05-22 | End: 2017-05-22

## 2017-05-22 RX ORDER — POTASSIUM CHLORIDE 20 MEQ
10 PACKET (EA) ORAL
Qty: 0 | Refills: 0 | Status: COMPLETED | OUTPATIENT
Start: 2017-05-22 | End: 2017-05-22

## 2017-05-22 RX ORDER — SODIUM BICARBONATE 1 MEQ/ML
0.08 SYRINGE (ML) INTRAVENOUS
Qty: 50 | Refills: 0 | Status: DISCONTINUED | OUTPATIENT
Start: 2017-05-22 | End: 2017-05-22

## 2017-05-22 RX ORDER — SODIUM CHLORIDE 9 MG/ML
1000 INJECTION INTRAMUSCULAR; INTRAVENOUS; SUBCUTANEOUS ONCE
Qty: 0 | Refills: 0 | Status: DISCONTINUED | OUTPATIENT
Start: 2017-05-22 | End: 2017-05-22

## 2017-05-22 RX ADMIN — NYSTATIN CREAM 1 APPLICATION(S): 100000 CREAM TOPICAL at 06:47

## 2017-05-22 RX ADMIN — Medication 50 MILLIEQUIVALENT(S): at 08:28

## 2017-05-22 RX ADMIN — Medication 100 MILLIGRAM(S): at 06:47

## 2017-05-22 RX ADMIN — Medication 100 MILLIEQUIVALENT(S): at 03:17

## 2017-05-22 RX ADMIN — Medication 100 MILLIEQUIVALENT(S): at 02:35

## 2017-05-22 RX ADMIN — Medication 50 MILLILITER(S): at 06:45

## 2017-05-22 RX ADMIN — Medication 50 MILLIGRAM(S): at 05:22

## 2017-05-22 RX ADMIN — Medication 125 MILLIGRAM(S): at 06:49

## 2017-05-22 RX ADMIN — KETAMINE HYDROCHLORIDE 30 MILLIGRAM(S): 100 INJECTION INTRAMUSCULAR; INTRAVENOUS at 10:15

## 2017-05-22 RX ADMIN — Medication 100 MILLIEQUIVALENT(S): at 03:44

## 2017-05-22 RX ADMIN — Medication 50 MILLILITER(S): at 00:34

## 2017-05-22 RX ADMIN — SODIUM CHLORIDE 2000 MILLILITER(S): 9 INJECTION INTRAMUSCULAR; INTRAVENOUS; SUBCUTANEOUS at 08:11

## 2017-05-22 RX ADMIN — HEPARIN SODIUM 5000 UNIT(S): 5000 INJECTION INTRAVENOUS; SUBCUTANEOUS at 06:45

## 2017-05-22 RX ADMIN — Medication 125 MILLIGRAM(S): at 00:35

## 2017-05-22 NOTE — DISCHARGE NOTE FOR THE EXPIRED PATIENT - OTHER SIGNIFICANT FINDINGS
Attending addendum:  This patient was admitted to the ICU with an initial suspicion for sepsis/ septic shock.  However she continued to worsen despite abx, aggressive fluid boluses, and vasopressors.  Her troponins continued to rise along with her lactate.  A bedside echocardiogram was performed which revealed massively dilated right ventricle with a preserved apex (Kathleen's sign).  Unfortunately the patient was too unstable to move to CAT scan to confirm, and declined rapidly in ICU, necessitating the aggressive measures documented above.  She ultimately  at 11:18 AM.

## 2017-05-22 NOTE — DISCHARGE NOTE FOR THE EXPIRED PATIENT - NS PRELIMINARY CAUSE OF DEATH_RESTRICTED
Diabetes/Multi-organ Dysfunction Syndrome/Sepsis/Cardiopulmonary Arrest/Chronic lung disease/Respiratory Failure

## 2017-05-22 NOTE — DISCHARGE NOTE FOR THE EXPIRED PATIENT - SECONDARY DIAGNOSIS.
Pulmonary embolism Type 2 diabetes mellitus without complication, with long-term current use of insulin Hypothyroidism, unspecified type Iron deficiency anemia, unspecified iron deficiency anemia type Severe malnutrition

## 2017-05-22 NOTE — DISCHARGE NOTE FOR THE EXPIRED PATIENT - NS PATIENT DEATH CRITERIA
Patient is dead based upon Brain Death Criteria/Patient is dead based on Cardiopulmonary criteria including absent breath sounds, pulselessness and/or asystole

## 2017-05-22 NOTE — DISCHARGE NOTE FOR THE EXPIRED PATIENT - HOSPITAL COURSE
77 year old female from [facility] with PMH of dementia, HTN, IDDM, chronic iron deficiency anemia (baseline hemoglobin 8), and recurrent hospitalizations over the last 6 months due to multiple infections including stage IV sacral decubitus ulcer (on wound vac, s/p treatment with tigecycline and fluconazole for ESBL Ecoli, E. faecalis, and candida) requiring chronic muniz, and GI bleed secondary to c diff colitis (s/p treatment with vancomycin, then empiric treatment with flagyl) presented with complaint of worsening rash from nursing home.     In the ED, the patient became hypotensive to 90/50 after 2L bolus. Lactate was elevated to 6.4. Patient with leukocytosis of 24.1.The patient's blood pressure dropped to 75/50 and emergent right IJ was placed. She received 1 dose of vancomycin and azactam in the ED.   Goals of care were discussed with the patient's daughter who wants full code despite understanding of poor prognosis given multiple hospitalizations in recent months.  Admitted to ICU for septic shock     At 9:45 am, she was not responsive and she got intubated.   Checked her heart activity with bedside echo and massive PE was suspected. Initial 10 mg of TPA was given stat.   At 10: 26 AM, there was PEA and resuscitation was initiated. She received 3 epi and 1 bicarb.  ROSC obtained after 12 minutes later.   125 mg of TPA was given.   At 10:39, tele monitor showed VTech and resuscitation was initiated again. She had one time of cardioversion and 3 epi and 1 bicarb were given. ROSC obtained after 8 minutes.  Dr. Borges and Clinical specialist BURKE Samuels discussed with the daughter. No further CPR will be done.  Patient lost pulse and no apex beat noted, pupils fixed and dilated. patient is declared dead at 1118 AM on 5/22/17 and family(daughter) is informed who is at bedside, 77 year old female from [facility] with PMH of dementia, HTN, IDDM, chronic iron deficiency anemia (baseline hemoglobin 8), and recurrent hospitalizations over the last 6 months due to multiple infections including stage IV sacral decubitus ulcer (on wound vac, s/p treatment with tigecycline and fluconazole for ESBL Ecoli, E. faecalis, and candida) requiring chronic muniz, and GI bleed secondary to c diff colitis (s/p treatment with vancomycin, then empiric treatment with flagyl) presented with complaint of worsening rash from nursing home.     In the ED, the patient became hypotensive to 90/50 after 2L bolus. Lactate was elevated to 6.4. Patient with leukocytosis of 24.1.The patient's blood pressure dropped to 75/50 and emergent right IJ was placed. She received 1 dose of vancomycin and azactam in the ED.   Goals of care were discussed with the patient's daughter who wants full code despite understanding of poor prognosis given multiple hospitalizations in recent months.  Admitted to ICU for septic shock     At 9:45 am, she was not responsive and she got intubated.   Checked her heart activity with bedside echo and massive PE was suspected. Initial 10 mg of TPA was given stat.   At 10: 26 AM, there was PEA and resuscitation was initiated. She received 3 epi and 1 bicarb.  ROSC obtained after 12 minutes later.   125 mg of TPA was given.   At 10:39, tele monitor showed VTech and resuscitation was initiated again. She had one time of cardioversion and 3 epi and 1 bicarb were given. ROSC obtained after 8 minutes.  Dr. Borges and Clinical specialist BURKE Samuels discussed with the daughter. No further CPR will be done.  Patient lost pulse again and no apex beat or peripheral pulses, pupils fixed and dilated. patient is declared dead at 1118 AM on 5/22/17 and family(daughter) is informed who is at bedside, and family do not want autopsy to be performed. 77 year old female from [facility] with PMH of dementia, HTN, IDDM, chronic iron deficiency anemia (baseline hemoglobin 8), and recurrent hospitalizations over the last 6 months due to multiple infections including stage IV sacral decubitus ulcer (on wound vac, s/p treatment with tigecycline and fluconazole for ESBL Ecoli, E. faecalis, and candida) requiring chronic muniz, and GI bleed secondary to c diff colitis (s/p treatment with vancomycin, then empiric treatment with flagyl) presented with complaint of worsening rash from nursing home.     In the ED, the patient became hypotensive to 90/50 after 2L bolus. Lactate was elevated to 6.4. Patient with leukocytosis of 24.1.The patient's blood pressure dropped to 75/50 and emergent right IJ was placed. She received 1 dose of vancomycin and azactam in the ED.   Goals of care were discussed with the patient's daughter who wants full code despite understanding of poor prognosis given multiple hospitalizations in recent months.  Admitted to ICU for septic shock     At 9:45 am, she was not responsive and was emergently intubated. She was noted to have a rising lactate, dropping blood pressure, despite fluids and pressors.   The ICU attending and a cardiologist checked her heart activity with a bedside echo and a massive PE was suspected due to RV dilation, with hyperdynamic LV. Initial 10 mg of TPA was given stat.   At 10: 26 AM, there was PEA and resuscitation was initiated. She received 3 epi and 1 bicarb.  ROSC obtained after 12 minutes later.   50 mg of TPA was given during the cardiac arrest.   At 10:39, tele monitor showed VTach and resuscitation was initiated again. She had one time of cardioversion and 3 epi and 1 bicarb were given. ROSC obtained after 8 minutes.  Dr. Borges and Clinical specialist BURKE Samuels discussed with the daughter. No further CPR will be done.  Patient lost pulse again and no apex beat or peripheral pulses, pupils fixed and dilated. patient is declared dead at 11:18 AM on 5/22/17 and family(daughter) is informed who is at bedside, and family do not want autopsy to be performed.

## 2017-05-22 NOTE — CHART NOTE - NSCHARTNOTEFT_GEN_A_CORE
At 9:45 am, she was not responsive and she got intubated.   Checked her heart activity with bedside echo and massive PE was suspected. Initial 10 mg of TPA was given stat.   At 10: 26 AM, there was PEA and resuscitation was initiated. She received 3 epi and 1 bicarb.  ROSC obtained after 12 minutes later.   125 mg of TPA was given.   At 10:39, tele monitor showed VTech and resuscitation was initiated again. She had one time of cardioversion and 3 epi and 1 bicarb were given. ROSC obtained after 8 minutes.  Dr. Borges and Clinical specialist BURKE Samuels discussed with the daughter. No further CPR will be done. At 9:45 am, she was not responsive and she got intubated.   Checked her heart activity with bedside echo and massive PE was suspected. Initial 10 mg of TPA was given stat.   At 10: 26 AM, there was PEA and resuscitation was initiated. She received 3 epi and 1 bicarb.  ROSC obtained after 12 minutes later.   50 mg of TPA was given.   At 10:39, tele monitor showed VTech and resuscitation was initiated again. She had one time of cardioversion and 3 epi and 1 bicarb were given. ROSC obtained after 8 minutes.  Dr. Borges and Clinical specialist BURKE Samuels discussed with the daughter. No further CPR will be done.

## 2017-05-22 NOTE — PROGRESS NOTE ADULT - ASSESSMENT
77 year old female from [facility] with PMH of dementia, HTN, IDDM, chronic iron deficiency anemia (baseline hemoglobin 8), and recurrent hospitalizations over the last 6 months due to multiple infections including stage IV sacral decubitus ulcer (on wound vac, s/p treatment with tigecycline and fluconazole for ESBL Ecoli, E. faecalis, and candida)chronic muniz, and GI bleed secondary to c diff colitis (s/p treatment with vancomycin, then empiric treatment with flagyl) presented with complaint of worsening rash from nursing home- associated with poor oral intake. Admitted to ICU for septic shock    NEURO: patient is agitated and confused, not on any sedatives.    CARDIAC: Septic shock- likely secondary to c diff vs UTI vs infected sacral ulcer.   Will continue levophed to maintain MAP>65  Will continue aspirin     RESP: patient is maintaing saturations on 2 L NC.    GI: Patient is being managed for septic shock secondary to ? c.diff colitis.  Will f/u C.diff PCR. she was recently started on empiric treatment for c diff. No cdiff sample was sent at that time  -continue contact isolation until c diff ruled out  CT abdomen and pelvis s/o GB wall thickening and anasarca.  Will continue albumin 50 mg IV Q6 for 3 days given low albumin levels.  Will keep her NPO for now.  -continue vancomycin PO and flagyl given severe c diff with lactate of 11.3    ID: -continue vancomycin IV, azactam, and flagyl for coverage of possible osteomyelitis of sacral stage IV ulcer  -continue bacid    : UA was positive, 11-25 WBC, will repeat UA froom new muniz . f/u urine culture. Has chronic muniz.    EXTREMITIES: Less likely osteomyelitis as patient was on wound vac with improved healing of stage IV sacral ulcer. No purulent discharge noted from ulcer. Will f/u surgery recommendations. Dr. Riley  Patient has skin rash, likely fungal along lower abdomen and groin. continue nystatin powder  Patient with evidence of skin excoriation along lower extremities.     OTHEr:  Severe malnutrition.  albumin 0.7  -continue albumin and multivitamin, vitamin c, folic acid   -follow up nutrition consult.   ENDOCRINE:   Hypothyroidism, Continue home dose levothyroxine 25mcg. f/u  TSH.    Type 2 diabetes mellitus   Will Continue HSS and f/u  A1C.     Hem –ONC:   iron deficiency anemia type.    Patient has history of iron deficiency anemia (baseline hemoglobin 8 in March 2017).   continue iron supplementation and transfuse if needed.     DVT prophylaxis- with heparin s/c. 77 year old female from [facility] with PMH of dementia, HTN, IDDM, chronic iron deficiency anemia (baseline hemoglobin 8), and recurrent hospitalizations over the last 6 months due to multiple infections including stage IV sacral decubitus ulcer (on wound vac, s/p treatment with tigecycline and fluconazole for ESBL Ecoli, E. faecalis, and candida)chronic muniz, and GI bleed secondary to c diff colitis (s/p treatment with vancomycin, then empiric treatment with flagyl) presented with complaint of worsening rash from nursing home- associated with poor oral intake. Admitted to ICU for septic shock    NEURO: patient is agitated and confused, not on any sedatives.    CARDIAC: Septic shock- likely secondary to c diff vs UTI vs infected sacral ulcer.   Will continue levophed to maintain MAP>65  Will continue aspirin     RESP: patient is maintaing saturations on 2 L NC.    GI: Patient is being managed for septic shock secondary to ? c.diff colitis.  Will f/u C.diff PCR. she was recently started on empiric treatment for c diff. No cdiff sample was sent at that time  -continue contact isolation until c diff ruled out  CT abdomen and pelvis s/o GB wall thickening and anasarca.  Will continue albumin 50 mg IV Q6 for 3 days given low albumin levels.  Will keep her NPO for now.  -continue vancomycin PO and flagyl given severe c diff with lactate of 11.3    ID: -continue vancomycin IV, azactam, and flagyl for coverage of possible osteomyelitis of sacral stage IV ulcer  -continue bacid    : UA was positive, 11-25 WBC, will repeat UA froom new muniz . f/u urine culture. Has chronic muniz.    EXTREMITIES: Less likely osteomyelitis as patient was on wound vac with improved healing of stage IV sacral ulcer. No purulent discharge noted from ulcer. Will f/u surgery recommendations. Dr. Riley  Patient has skin rash, likely fungal along lower abdomen and groin. continue nystatin powder  Patient with evidence of skin excoriation along lower extremities.     OTHEr:  Severe malnutrition.  albumin 0.7  -continue albumin and multivitamin, vitamin c, folic acid   -follow up nutrition consult.   ENDOCRINE:   Hypothyroidism, Continue home dose levothyroxine 25mcg. f/u  TSH.    Type 2 diabetes mellitus   Will Continue HSS and f/u  A1C.     Hem –ONC:   iron deficiency anemia type.    Patient has history of iron deficiency anemia (baseline hemoglobin 8 in March 2017).   continue iron supplementation and transfuse if needed.     Nephro: patient had lactate of 11.3, likely metabolic acidosis.    DVT prophylaxis- with heparin s/c. 77 year old female from [facility] with PMH of dementia, HTN, IDDM, chronic iron deficiency anemia (baseline hemoglobin 8), and recurrent hospitalizations over the last 6 months due to multiple infections including stage IV sacral decubitus ulcer (on wound vac, s/p treatment with tigecycline and fluconazole for ESBL Ecoli, E. faecalis, and candida)chronic muniz, and GI bleed secondary to c diff colitis (s/p treatment with vancomycin, then empiric treatment with flagyl) presented with complaint of worsening rash from nursing home- associated with poor oral intake. Admitted to ICU for septic shock  NEURO: patient is agitated and confused, not on any sedatives.  CARDIAC: Septic shock- likely secondary to c diff vs UTI vs infected sacral ulcer.   Will continue levophed to maintain MAP>65  Will continue aspirin   RESP: patient is maintaing saturations on 2 L NC.  GI: Patient is being managed for septic shock secondary to ? c.diff colitis.  Will f/u C.diff PCR. she was recently started on empiric treatment for c diff. No cdiff sample was sent at that time  -continue contact isolation until c diff ruled out  CT abdomen and pelvis s/o GB wall thickening and anasarca.  Will continue albumin 50 mg IV Q6 for 3 days given low albumin levels.  Will keep her NPO for now.  -continue vancomycin PO and flagyl given severe c diff with lactate of 11.3    ID: -continue vancomycin IV, azactam, and flagyl for coverage of possible osteomyelitis of sacral stage IV ulcer  -continue bacid    : UA was positive, 11-25 WBC, will repeat UA froom new muniz . f/u urine culture. Has chronic muniz.    EXTREMITIES: Less likely osteomyelitis as patient was on wound vac with improved healing of stage IV sacral ulcer. No purulent discharge noted from ulcer. Will f/u surgery recommendations. Dr. Riley  Patient has skin rash, likely fungal along lower abdomen and groin. continue nystatin powder  Patient with evidence of skin excoriation along lower extremities.     OTHEr:  Severe malnutrition.  albumin 0.7  -continue albumin and multivitamin, vitamin c, folic acid   -follow up nutrition consult.   ENDOCRINE:   Hypothyroidism, Continue home dose levothyroxine 25mcg. f/u  TSH.    Type 2 diabetes mellitus   Will Continue HSS and f/u  A1C.   Hem –ONC:   iron deficiency anemia type.    Patient has history of iron deficiency anemia (baseline hemoglobin 8 in March 2017).   continue iron supplementation and transfuse if needed.   Nephro: patient had lactate of 11.3, metabolic acidosis.   DVT prophylaxis- with heparin s/c.  ADDENDUM: patient became lethargic and was found to have lower extremity DVT, plan for CTA chest , finally got left EJ, with poor peripheral access, but given worsening lethargy was intubated for airway protection. At 9:45 am, she was not responsive and she got intubated.   Checked her heart activity with bedside echo and massive PE was suspected. Initial 10 mg of TPA was given stat.   At 10: 26 AM, there was PEA and resuscitation was initiated. She received 3 epi and 1 bicarb.  ROSC obtained after 12 minutes later.   125 mg of TPA was given.   At 10:39, tele monitor showed VTach and resuscitation was initiated again. She had one time of cardioversion and 3 epi and 1 bicarb were given. ROSC obtained after 8 minutes.  Dr. Borges and Clinical specialist BURKE Samuels discussed with the daughter. No further CPR will be done.

## 2017-05-22 NOTE — CONSULT NOTE ADULT - SUBJECTIVE AND OBJECTIVE BOX
HPI:  77 year old female from [facility] with PMH of dementia, HTN, IDDM, chronic iron deficiency anemia (baseline hemoglobin 8), and recurrent hospitalizations over the last 6 months due to multiple infections including stage IV sacral decubitus ulcer (on wound vac, s/p treatment with tigecycline and fluconazole for ESBL Ecoli, E. faecalis, and candida) requiring chronic muniz, and GI bleed secondary to c diff colitis (s/p treatment with vancomycin, then empiric treatment with flagyl) presented with complaint of worsening rash from nursing home. The patient was unable to provide history or ROS, all history was obtained from her daughter at bedside. As per her daughter Alejandrina Chacon ([phone]), she was discharged from from [facility] in 3/17 after treatment for c diff colitis and infected sacral stage IV requiring wound vac. 1 week ago she developed who body rash, worse around the groin and lower abdomen. Her daughter also reported mouth ulcer and poor oral intake for the last 3 days. She reported associated chills but denied fever, shortness of breath, cough, chest pain, palpitations, nausea, vomiting, or urinary symptoms.     In the ED, the patient became hypotensive to 90/50 after 2L bolus. The patient's blood pressure dropped to 75/50 and emergent right IJ was placed. She received 1 dose of vancomycin and azactam in the ED.   Goals of care were discussed with the patient's daughter who wants full code despite understanding of poor prognosis given multiple hospitalizations in recent months. (21 May 2017 22:05)      PAST MEDICAL & SURGICAL HISTORY:  Malnutrition, calorie: severe protein malnutrition.  Skin ulcer of sacrum with necrosis of muscle  Hypothyroidism, unspecified type  Dementia without behavioral disturbance, unspecified dementia type  DM (diabetes mellitus)  Anemia  No significant past surgical history      penicillin (Anaphylaxis)      Meds:  acetaminophen    Suspension. 650milliGRAM(s) Oral once  norepinephrine Infusion 0.05MICROgram(s)/kG/Min IV Continuous <Continuous>  vancomycin    Solution 125milliGRAM(s) Oral every 6 hours  vancomycin  IVPB 1000milliGRAM(s) IV Intermittent every 12 hours  aztreonam  IVPB 1000milliGRAM(s) IV Intermittent every 6 hours  metroNIDAZOLE  IVPB  IV Intermittent   heparin  Injectable 5000Unit(s) SubCutaneous every 8 hours  pantoprazole    Tablet 40milliGRAM(s) Oral before breakfast  metroNIDAZOLE  IVPB 500milliGRAM(s) IV Intermittent every 8 hours  nystatin Powder 1Application(s) Topical two times a day  aspirin  chewable 81milliGRAM(s) Oral daily  acetaminophen   Tablet 650milliGRAM(s) Oral every 6 hours PRN  traZODone 50milliGRAM(s) Oral at bedtime  insulin lispro (HumaLOG) corrective regimen sliding scale  SubCutaneous three times a day before meals  dextrose 5%. 1000milliLiter(s) IV Continuous <Continuous>  dextrose Gel 1Dose(s) Oral once PRN  dextrose 50% Injectable 12.5Gram(s) IV Push once  dextrose 50% Injectable 25Gram(s) IV Push once  dextrose 50% Injectable 25Gram(s) IV Push once  glucagon  Injectable 1milliGRAM(s) IntraMuscular once PRN  simvastatin 40milliGRAM(s) Oral at bedtime  guaiFENesin    Syrup 100milliGRAM(s) Oral every 4 hours PRN  ferrous    sulfate 325milliGRAM(s) Oral two times a day with meals  lactobacillus acidophilus 1Tablet(s) Oral three times a day with meals  levothyroxine 25MICROGram(s) Oral daily  multivitamin 1Tablet(s) Oral daily  folic acid 1milliGRAM(s) Oral daily  ascorbic acid 500milliGRAM(s) Oral daily  zinc sulfate 220milliGRAM(s) Oral daily  albumin human 25% IVPB 50milliLiter(s) IV Intermittent every 6 hours  sodium bicarbonate  Infusion 0.08mEq/kG/Hr IV Continuous <Continuous>  vecuronium Injectable 6milliGRAM(s) IV Push once  EPINEPHrine     Infusion 0.1MICROgram(s)/kG/Min IV Continuous <Continuous>  alteplase    IVPB 90milliGRAM(s) IV Intermittent once  alteplase    IVPB 10milliGRAM(s) IV Intermittent once      FAMILY HISTORY:  No pertinent family history in first degree relatives      VITALS:  Vital Signs Last 24 Hrs  T(C): 35.2, Max: 35.6 (05-22 @ 02:00)  T(F): 95.4, Max: 96 (05-22 @ 02:00)  HR: 0 (0 - 122)  BP: 156/86 (49/21 - 156/86)  BP(mean): 100 (28 - 124)  RR: 27 (13 - 39)  SpO2: 44% (44% - 98%)    LABS/DIAGNOSTIC TESTS:                          9.1    21.8  )-----------( 259      ( 22 May 2017 06:51 )             25.4     WBC Count: 21.8 K/uL ( @ 06:51)  WBC Count: 18.2 K/uL ( @ 23:39)  WBC Count: 24.1 K/uL ( @ 18:28)          135  |  102  |  11  ----------------------------<  132<H>  4.6   |  18<L>  |  0.76    Ca    6.7<L>      22 May 2017 06:51  Phos  1.8       Mg     1.7         TPro  5.1<L>  /  Alb  0.9<L>  /  TBili  0.6  /  DBili  x   /  AST  37  /  ALT  43  /  AlkPhos  169<H>        Urinalysis Basic - ( 21 May 2017 18:28 )    Color: Yellow / Appearance: Clear / S.010 / pH: x  Gluc: x / Ketone: Negative  / Bili: Negative / Urobili: Negative   Blood: x / Protein: 30 mg/dL / Nitrite: Negative   Leuk Esterase: Moderate / RBC: 10-25 /HPF / WBC 11-25 /HPF   Sq Epi: x / Non Sq Epi: Few / Bacteria: Moderate /HPF        LIVER FUNCTIONS - ( 21 May 2017 18:28 )  Alb: 0.9 g/dL / Pro: 5.1 g/dL / ALK PHOS: 169 U/L / ALT: 43 U/L DA / AST: 37 U/L / GGT: x                 LACTATE:Lactate, Blood: 11.3 mmol/L ( @ 06:50)  Lactate, Blood: 5.7 mmol/L ( @ 23:39)  Lactate, Blood: 6.4 mmol/L ( @ 19:45)      ABG - ABG - ( 22 May 2017 08:18 )  pH: 7.32  /  pCO2: 15    /  pO2: 104   / HCO3: 8     / Base Excess: -17.4 /  SaO2: 97                  CULTURES:       RADIOLOGY:  CT Abd : Wall thickening of the gallbladder may be secondary to third spacing but   raises suspicion for acute cholecystitis. Wall thickening of the gallbladder may be secondary to third spacing but   raises suspicion for acute cholecystitis.   CXR: No evidence for focal infiltrate or lobar consolidation.        ROS  [  ] UNABLE TO ELICIT

## 2017-05-22 NOTE — PROGRESS NOTE ADULT - ATTENDING COMMENTS
77 female with hx of dementia, decub ulcers, DM, recent C-diff colitis, presented to ED with failure to thrive and "groin rash" but was found to be hypotensive with rising lactate/metabolic acidosis and troponins despite abx, fluid resuscitation, and pressors.  This morning a bedside echo showed a dilated right ventricle with a small hyperdynamic left ventricle.  We had a strong suspicion for pulmonary embolism, however she declined rapidly and was  too unstable for transport to CT.  She became obtunded and apneic and was intubated.  Several minutes later her blood pressure began to drop, necessitating  increasing doses of pressors.  Eventually she developed a PEA cardiac arrest, at which time she received ACLS and  was given IV TPA 50 mg as a slow IV push.  She eventually developed ROSC but arrested again.  After the second time a pulse was regained, I spoke with the daughter who was at the bedside about the abysmal prognosis and she agreed to not perform CPR a third time.  Later in the morning she developed asystole and was pronounced dead.  Total critical care time managing this patient with cardogenic/septic shock, probable massive pulmonary embolism, severe metabolic acidosis, acute respiratory failure, and subsequent cardiac arrest: 75 minutes.

## 2017-05-22 NOTE — PROGRESS NOTE ADULT - SUBJECTIVE AND OBJECTIVE BOX
INTERVAL HPI/OVERNIGHT EVENTS: ***    PRESSORS: [ x] YES [ ] NO  WHICH: levophed    ANTIBIOTICS:                  DATE STARTED:  ANTIBIOTICS:                  DATE STARTED:  ANTIBIOTICS:                  DATE STARTED:    Antimicrobial:  vancomycin    Solution 125milliGRAM(s) Oral every 6 hours  vancomycin  IVPB 1000milliGRAM(s) IV Intermittent every 12 hours  aztreonam  IVPB 1000milliGRAM(s) IV Intermittent every 6 hours  metroNIDAZOLE  IVPB  IV Intermittent   metroNIDAZOLE  IVPB 500milliGRAM(s) IV Intermittent every 8 hours    Cardiovascular:  norepinephrine Infusion 0.05MICROgram(s)/kG/Min IV Continuous <Continuous>    Pulmonary:  guaiFENesin    Syrup 100milliGRAM(s) Oral every 4 hours PRN    Hematalogic:  heparin  Injectable 5000Unit(s) SubCutaneous every 8 hours  aspirin  chewable 81milliGRAM(s) Oral daily    Other:  acetaminophen    Suspension. 650milliGRAM(s) Oral once  pantoprazole    Tablet 40milliGRAM(s) Oral before breakfast  nystatin Powder 1Application(s) Topical two times a day  acetaminophen   Tablet 650milliGRAM(s) Oral every 6 hours PRN  traZODone 50milliGRAM(s) Oral at bedtime  insulin lispro (HumaLOG) corrective regimen sliding scale  SubCutaneous three times a day before meals  dextrose 5%. 1000milliLiter(s) IV Continuous <Continuous>  dextrose Gel 1Dose(s) Oral once PRN  dextrose 50% Injectable 12.5Gram(s) IV Push once  dextrose 50% Injectable 25Gram(s) IV Push once  dextrose 50% Injectable 25Gram(s) IV Push once  glucagon  Injectable 1milliGRAM(s) IntraMuscular once PRN  simvastatin 40milliGRAM(s) Oral at bedtime  ferrous    sulfate 325milliGRAM(s) Oral two times a day with meals  lactobacillus acidophilus 1Tablet(s) Oral three times a day with meals  levothyroxine 25MICROGram(s) Oral daily  multivitamin 1Tablet(s) Oral daily  folic acid 1milliGRAM(s) Oral daily  ascorbic acid 500milliGRAM(s) Oral daily  zinc sulfate 220milliGRAM(s) Oral daily  albumin human 25% IVPB 50milliLiter(s) IV Intermittent every 6 hours  sodium chloride 0.9% Bolus 1000milliLiter(s) IV Bolus once    acetaminophen    Suspension. 650milliGRAM(s) Oral once  norepinephrine Infusion 0.05MICROgram(s)/kG/Min IV Continuous <Continuous>  vancomycin    Solution 125milliGRAM(s) Oral every 6 hours  vancomycin  IVPB 1000milliGRAM(s) IV Intermittent every 12 hours  aztreonam  IVPB 1000milliGRAM(s) IV Intermittent every 6 hours  metroNIDAZOLE  IVPB  IV Intermittent   heparin  Injectable 5000Unit(s) SubCutaneous every 8 hours  pantoprazole    Tablet 40milliGRAM(s) Oral before breakfast  metroNIDAZOLE  IVPB 500milliGRAM(s) IV Intermittent every 8 hours  nystatin Powder 1Application(s) Topical two times a day  aspirin  chewable 81milliGRAM(s) Oral daily  acetaminophen   Tablet 650milliGRAM(s) Oral every 6 hours PRN  traZODone 50milliGRAM(s) Oral at bedtime  insulin lispro (HumaLOG) corrective regimen sliding scale  SubCutaneous three times a day before meals  dextrose 5%. 1000milliLiter(s) IV Continuous <Continuous>  dextrose Gel 1Dose(s) Oral once PRN  dextrose 50% Injectable 12.5Gram(s) IV Push once  dextrose 50% Injectable 25Gram(s) IV Push once  dextrose 50% Injectable 25Gram(s) IV Push once  glucagon  Injectable 1milliGRAM(s) IntraMuscular once PRN  simvastatin 40milliGRAM(s) Oral at bedtime  guaiFENesin    Syrup 100milliGRAM(s) Oral every 4 hours PRN  ferrous    sulfate 325milliGRAM(s) Oral two times a day with meals  lactobacillus acidophilus 1Tablet(s) Oral three times a day with meals  levothyroxine 25MICROGram(s) Oral daily  multivitamin 1Tablet(s) Oral daily  folic acid 1milliGRAM(s) Oral daily  ascorbic acid 500milliGRAM(s) Oral daily  zinc sulfate 220milliGRAM(s) Oral daily  albumin human 25% IVPB 50milliLiter(s) IV Intermittent every 6 hours  sodium chloride 0.9% Bolus 1000milliLiter(s) IV Bolus once    Drug Dosing Weight  Height (cm): 144.8 (21 May 2017 23:37)  Weight (kg): 62.4 (21 May 2017 23:37)  BMI (kg/m2): 29.8 (21 May 2017 23:37)  BSA (m2): 1.53 (21 May 2017 23:37)    CENTRAL LINE: [ ] YES [ ] NO  LOCATION:   DATE INSERTED:  REMOVE: [ ] YES [ ] NO  EXPLAIN:    MUNIZ: [ ] YES [ ] NO    DATE INSERTED:  REMOVE:  [ ] YES [ ] NO  EXPLAIN:    A-LINE:  [ ] YES [ ] NO  LOCATION:   DATE INSERTED:  REMOVE:  [ ] YES [ ] NO  EXPLAIN:    PMH -reviewed admission note, no change since admission  Heart faliure: acute [ ] chronic [ ] acute or chronic [ ] diastolic [ ] systolic [ ] combied systolic and diastolic[ ]  BONG: ATN[ ] renal medullary necrosis [ ] CKD I [ ]CKDII [ ]CKD III [ ]CKD IV [ ]CKD V [ ]Other pathological lesions [ ]  Abdominal Nutrition Status: malnutrition [ ] cachexia [ ] morbid obesity/BMI=40 [ ] Supplement ordered [___________]     ICU Vital Signs Last 24 Hrs  T(C): 35.2, Max: 35.6 ( @ 02:00)  T(F): 95.4, Max: 96 ( @ 02:00)  HR: 120 (98 - 122)  BP: 134/120 (70/56 - 142/97)  BP(mean): 124 (50 - 124)  ABP: --  ABP(mean): --  RR: 34 (13 - 34)  SpO2: 55% (55% - 98%)                  PHYSICAL EXAM:    GENERAL:   HEAD: atraumatic, normocephalic   EYES: PERRLA, white sclera.   ENMT: nasal mucosa- Oral cavity-   NECK: supple, JVD/ no JVD, thyroid-   LYMPH: no palpable lymph nodes     SKIN: warm, dry   CHEST/LUNG: ET tube: size        cm at lip. No Chest deformity , no chest tenderness. bilateral breath sounds,no  adventitious sounds  HEART: RRR, no m/r/g   ABDOMEN: soft, nontender, nondistended; bowel sounds.  :muniz catheter.  EXTREMITIES: +1 non-pitting edema, no cyanosis, no clubbing.  NEURO: AA0X , mood/ affect-, no focal neuro deficits        LABS:  CBC Full  -  ( 22 May 2017 06:51 )  WBC Count : 21.8 K/uL  Hemoglobin : 9.1 g/dL  Hematocrit : 25.4 %  Platelet Count - Automated : 259 K/uL  Mean Cell Volume : 77.8 fl  Mean Cell Hemoglobin : 27.8 pg  Mean Cell Hemoglobin Concentration : 35.7 gm/dL  Auto Neutrophil # : x  Auto Lymphocyte # : x  Auto Monocyte # : x  Auto Eosinophil # : x  Auto Basophil # : x  Auto Neutrophil % : x  Auto Lymphocyte % : x  Auto Monocyte % : x  Auto Eosinophil % : x  Auto Basophil % : x        135  |  102  |  11  ----------------------------<  132<H>  4.6   |  18<L>  |  0.76    Ca    6.7<L>      22 May 2017 06:51  Phos  1.8       Mg     1.7         TPro  5.1<L>  /  Alb  0.9<L>  /  TBili  0.6  /  DBili  x   /  AST  37  /  ALT  43  /  AlkPhos  169<H>        Urinalysis Basic - ( 21 May 2017 18:28 )    Color: Yellow / Appearance: Clear / S.010 / pH: x  Gluc: x / Ketone: Negative  / Bili: Negative / Urobili: Negative   Blood: x / Protein: 30 mg/dL / Nitrite: Negative   Leuk Esterase: Moderate / RBC: 10-25 /HPF / WBC 11-25 /HPF   Sq Epi: x / Non Sq Epi: Few / Bacteria: Moderate /HPF          RADIOLOGY & ADDITIONAL STUDIES REVIEWED:  ***    [ ]GOALS OF CARE DISCUSSION WITH PATIENT/FAMILY/PROXY:    CRITICAL CARE TIME SPENT: 35 minutes INTERVAL HPI/OVERNIGHT EVENTS: ***  Patient received 1 unit PRBC  for low H/H and also started on pressor for shock unresponsive to IV fluids.    PRESSORS: [ x] YES [ ] NO  WHICH: levophed    ANTIBIOTICS: Vancomycin                  DATE STARTED: 17  ANTIBIOTICS: flagyl                 DATE STARTED: 17  ANTIBIOTICS:  Aztreonam                DATE STARTED: 17    Antimicrobial:  vancomycin    Solution 125milliGRAM(s) Oral every 6 hours  vancomycin  IVPB 1000milliGRAM(s) IV Intermittent every 12 hours  aztreonam  IVPB 1000milliGRAM(s) IV Intermittent every 6 hours  metroNIDAZOLE  IVPB  IV Intermittent   metroNIDAZOLE  IVPB 500milliGRAM(s) IV Intermittent every 8 hours    Cardiovascular:  norepinephrine Infusion 0.05MICROgram(s)/kG/Min IV Continuous <Continuous>    Pulmonary:  guaiFENesin    Syrup 100milliGRAM(s) Oral every 4 hours PRN    Hematalogic:  heparin  Injectable 5000Unit(s) SubCutaneous every 8 hours  aspirin  chewable 81milliGRAM(s) Oral daily    Other:  acetaminophen    Suspension. 650milliGRAM(s) Oral once  pantoprazole    Tablet 40milliGRAM(s) Oral before breakfast  nystatin Powder 1Application(s) Topical two times a day  acetaminophen   Tablet 650milliGRAM(s) Oral every 6 hours PRN  traZODone 50milliGRAM(s) Oral at bedtime  insulin lispro (HumaLOG) corrective regimen sliding scale  SubCutaneous three times a day before meals  dextrose 5%. 1000milliLiter(s) IV Continuous <Continuous>  dextrose Gel 1Dose(s) Oral once PRN  dextrose 50% Injectable 12.5Gram(s) IV Push once  dextrose 50% Injectable 25Gram(s) IV Push once  dextrose 50% Injectable 25Gram(s) IV Push once  glucagon  Injectable 1milliGRAM(s) IntraMuscular once PRN  simvastatin 40milliGRAM(s) Oral at bedtime  ferrous    sulfate 325milliGRAM(s) Oral two times a day with meals  lactobacillus acidophilus 1Tablet(s) Oral three times a day with meals  levothyroxine 25MICROGram(s) Oral daily  multivitamin 1Tablet(s) Oral daily  folic acid 1milliGRAM(s) Oral daily  ascorbic acid 500milliGRAM(s) Oral daily  zinc sulfate 220milliGRAM(s) Oral daily  albumin human 25% IVPB 50milliLiter(s) IV Intermittent every 6 hours  sodium chloride 0.9% Bolus 1000milliLiter(s) IV Bolus once    acetaminophen    Suspension. 650milliGRAM(s) Oral once  norepinephrine Infusion 0.05MICROgram(s)/kG/Min IV Continuous <Continuous>  vancomycin    Solution 125milliGRAM(s) Oral every 6 hours  vancomycin  IVPB 1000milliGRAM(s) IV Intermittent every 12 hours  aztreonam  IVPB 1000milliGRAM(s) IV Intermittent every 6 hours  metroNIDAZOLE  IVPB  IV Intermittent   heparin  Injectable 5000Unit(s) SubCutaneous every 8 hours  pantoprazole    Tablet 40milliGRAM(s) Oral before breakfast  metroNIDAZOLE  IVPB 500milliGRAM(s) IV Intermittent every 8 hours  nystatin Powder 1Application(s) Topical two times a day  aspirin  chewable 81milliGRAM(s) Oral daily  acetaminophen   Tablet 650milliGRAM(s) Oral every 6 hours PRN  traZODone 50milliGRAM(s) Oral at bedtime  insulin lispro (HumaLOG) corrective regimen sliding scale  SubCutaneous three times a day before meals  dextrose 5%. 1000milliLiter(s) IV Continuous <Continuous>  dextrose Gel 1Dose(s) Oral once PRN  dextrose 50% Injectable 12.5Gram(s) IV Push once  dextrose 50% Injectable 25Gram(s) IV Push once  dextrose 50% Injectable 25Gram(s) IV Push once  glucagon  Injectable 1milliGRAM(s) IntraMuscular once PRN  simvastatin 40milliGRAM(s) Oral at bedtime  guaiFENesin    Syrup 100milliGRAM(s) Oral every 4 hours PRN  ferrous    sulfate 325milliGRAM(s) Oral two times a day with meals  lactobacillus acidophilus 1Tablet(s) Oral three times a day with meals  levothyroxine 25MICROGram(s) Oral daily  multivitamin 1Tablet(s) Oral daily  folic acid 1milliGRAM(s) Oral daily  ascorbic acid 500milliGRAM(s) Oral daily  zinc sulfate 220milliGRAM(s) Oral daily  albumin human 25% IVPB 50milliLiter(s) IV Intermittent every 6 hours  sodium chloride 0.9% Bolus 1000milliLiter(s) IV Bolus once    Drug Dosing Weight  Height (cm): 144.8 (21 May 2017 23:37)  Weight (kg): 62.4 (21 May 2017 23:37)  BMI (kg/m2): 29.8 (21 May 2017 23:37)  BSA (m2): 1.53 (21 May 2017 23:37)    CENTRAL LINE: [x ] YES [ ] NO  LOCATION: [facility]  DATE INSERTED: 17  REMOVE: [ ] YES [ ] NO  EXPLAIN:    MUNIZ: [ x] YES [ ] NO    DATE INSERTED:17  REMOVE:  [ ] YES [ ] NO  EXPLAIN:    A-LINE:  [ ] YES [ x] NO  LOCATION:   DATE INSERTED:  REMOVE:  [ ] YES [ ] NO  EXPLAIN:    PMH -reviewed admission note, no change since admission  Heart faliure: acute [ ] chronic [ ] acute or chronic [ ] diastolic [ ] systolic [ ] combied systolic and diastolic[ ]  BONG: ATN[ ] renal medullary necrosis [ ] CKD I [ ]CKDII [ ]CKD III [ ]CKD IV [ ]CKD V [ ]Other pathological lesions [ ]  Abdominal Nutrition Status: malnutrition [ ] cachexia [ ] morbid obesity/BMI=40 [ ] Supplement ordered [___________]     ICU Vital Signs Last 24 Hrs  T(C): 35.2, Max: 35.6 (- @ 02:00)  T(F): 95.4, Max: 96 (- @ 02:00)  HR: 120 (98 - 122)  BP: 134/120 (70/56 - 142/97)  BP(mean): 124 (50 - 124)  ABP: --  ABP(mean): --  RR: 34 (13 - 34)  SpO2: 55% (55% - 98%)                  PHYSICAL EXAM:    GENERAL: patient tachypneic, restless, tachycardic.  HEAD: atraumatic, normocephalic   EYES: PERRLA, white sclera.   ENMT: nasal mucosa- normal  NECK: supple,  no JVD, thyroid- Normal  LYMPH: no palpable lymph nodes     SKIN: rash noted over perineal region and inframammary region bilaterally.  CHEST/LUNG: No Chest deformity , no chest tenderness. bilateral breath sounds.  HEART: RRR, no m/r/g   ABDOMEN: soft, Tender, distended, bowel sounds.  :muniz catheter.  EXTREMITIES: +1 non-pitting edema, no cyanosis, no clubbing.  NEURO: AA0X1 , mood/ affect- restless, no focal neuro deficits        LABS:  CBC Full  -  ( 22 May 2017 06:51 )  WBC Count : 21.8 K/uL  Hemoglobin : 9.1 g/dL  Hematocrit : 25.4 %  Platelet Count - Automated : 259 K/uL  Mean Cell Volume : 77.8 fl  Mean Cell Hemoglobin : 27.8 pg  Mean Cell Hemoglobin Concentration : 35.7 gm/dL  Auto Neutrophil # : x  Auto Lymphocyte # : x  Auto Monocyte # : x  Auto Eosinophil # : x  Auto Basophil # : x  Auto Neutrophil % : x  Auto Lymphocyte % : x  Auto Monocyte % : x  Auto Eosinophil % : x  Auto Basophil % : x    05-    135  |  102  |  11  ----------------------------<  132<H>  4.6   |  18<L>  |  0.76    Ca    6.7<L>      22 May 2017 06:51  Phos  1.8       Mg     1.7         TPro  5.1<L>  /  Alb  0.9<L>  /  TBili  0.6  /  DBili  x   /  AST  37  /  ALT  43  /  AlkPhos  169<H>  05-21      Urinalysis Basic - ( 21 May 2017 18:28 )    Color: Yellow / Appearance: Clear / S.010 / pH: x  Gluc: x / Ketone: Negative  / Bili: Negative / Urobili: Negative   Blood: x / Protein: 30 mg/dL / Nitrite: Negative   Leuk Esterase: Moderate / RBC: 10-25 /HPF / WBC 11-25 /HPF   Sq Epi: x / Non Sq Epi: Few / Bacteria: Moderate /HPF          RADIOLOGY & ADDITIONAL STUDIES REVIEWED:  ***    [ x]GOALS OF CARE DISCUSSION WITH PATIENT/FAMILY/PROXY: Full code    CRITICAL CARE TIME SPENT: 35 minutes INTERVAL HPI/OVERNIGHT EVENTS: ***  Patient received 1 unit PRBC  for low H/H and also started on pressor for shock unresponsive to IV fluids.    PRESSORS: [ x] YES [ ] NO  WHICH: levophed    ANTIBIOTICS: Vancomycin                  DATE STARTED: 17  ANTIBIOTICS: flagyl                 DATE STARTED: 17  ANTIBIOTICS:  Aztreonam                DATE STARTED: 17    Antimicrobial:  vancomycin    Solution 125milliGRAM(s) Oral every 6 hours  vancomycin  IVPB 1000milliGRAM(s) IV Intermittent every 12 hours  aztreonam  IVPB 1000milliGRAM(s) IV Intermittent every 6 hours  metroNIDAZOLE  IVPB  IV Intermittent   metroNIDAZOLE  IVPB 500milliGRAM(s) IV Intermittent every 8 hours    Cardiovascular:  norepinephrine Infusion 0.05MICROgram(s)/kG/Min IV Continuous <Continuous>    Pulmonary:  guaiFENesin    Syrup 100milliGRAM(s) Oral every 4 hours PRN    Hematalogic:  heparin  Injectable 5000Unit(s) SubCutaneous every 8 hours  aspirin  chewable 81milliGRAM(s) Oral daily    Other:  acetaminophen    Suspension. 650milliGRAM(s) Oral once  pantoprazole    Tablet 40milliGRAM(s) Oral before breakfast  nystatin Powder 1Application(s) Topical two times a day  acetaminophen   Tablet 650milliGRAM(s) Oral every 6 hours PRN  traZODone 50milliGRAM(s) Oral at bedtime  insulin lispro (HumaLOG) corrective regimen sliding scale  SubCutaneous three times a day before meals  dextrose 5%. 1000milliLiter(s) IV Continuous <Continuous>  dextrose Gel 1Dose(s) Oral once PRN  dextrose 50% Injectable 12.5Gram(s) IV Push once  dextrose 50% Injectable 25Gram(s) IV Push once  dextrose 50% Injectable 25Gram(s) IV Push once  glucagon  Injectable 1milliGRAM(s) IntraMuscular once PRN  simvastatin 40milliGRAM(s) Oral at bedtime  ferrous    sulfate 325milliGRAM(s) Oral two times a day with meals  lactobacillus acidophilus 1Tablet(s) Oral three times a day with meals  levothyroxine 25MICROGram(s) Oral daily  multivitamin 1Tablet(s) Oral daily  folic acid 1milliGRAM(s) Oral daily  ascorbic acid 500milliGRAM(s) Oral daily  zinc sulfate 220milliGRAM(s) Oral daily  albumin human 25% IVPB 50milliLiter(s) IV Intermittent every 6 hours  sodium chloride 0.9% Bolus 1000milliLiter(s) IV Bolus once    acetaminophen    Suspension. 650milliGRAM(s) Oral once  norepinephrine Infusion 0.05MICROgram(s)/kG/Min IV Continuous <Continuous>  vancomycin    Solution 125milliGRAM(s) Oral every 6 hours  vancomycin  IVPB 1000milliGRAM(s) IV Intermittent every 12 hours  aztreonam  IVPB 1000milliGRAM(s) IV Intermittent every 6 hours  metroNIDAZOLE  IVPB  IV Intermittent   heparin  Injectable 5000Unit(s) SubCutaneous every 8 hours  pantoprazole    Tablet 40milliGRAM(s) Oral before breakfast  metroNIDAZOLE  IVPB 500milliGRAM(s) IV Intermittent every 8 hours  nystatin Powder 1Application(s) Topical two times a day  aspirin  chewable 81milliGRAM(s) Oral daily  acetaminophen   Tablet 650milliGRAM(s) Oral every 6 hours PRN  traZODone 50milliGRAM(s) Oral at bedtime  insulin lispro (HumaLOG) corrective regimen sliding scale  SubCutaneous three times a day before meals  dextrose 5%. 1000milliLiter(s) IV Continuous <Continuous>  dextrose Gel 1Dose(s) Oral once PRN  dextrose 50% Injectable 12.5Gram(s) IV Push once  dextrose 50% Injectable 25Gram(s) IV Push once  dextrose 50% Injectable 25Gram(s) IV Push once  glucagon  Injectable 1milliGRAM(s) IntraMuscular once PRN  simvastatin 40milliGRAM(s) Oral at bedtime  guaiFENesin    Syrup 100milliGRAM(s) Oral every 4 hours PRN  ferrous    sulfate 325milliGRAM(s) Oral two times a day with meals  lactobacillus acidophilus 1Tablet(s) Oral three times a day with meals  levothyroxine 25MICROGram(s) Oral daily  multivitamin 1Tablet(s) Oral daily  folic acid 1milliGRAM(s) Oral daily  ascorbic acid 500milliGRAM(s) Oral daily  zinc sulfate 220milliGRAM(s) Oral daily  albumin human 25% IVPB 50milliLiter(s) IV Intermittent every 6 hours  sodium chloride 0.9% Bolus 1000milliLiter(s) IV Bolus once    Drug Dosing Weight  Height (cm): 144.8 (21 May 2017 23:37)  Weight (kg): 62.4 (21 May 2017 23:37)  BMI (kg/m2): 29.8 (21 May 2017 23:37)  BSA (m2): 1.53 (21 May 2017 23:37)    CENTRAL LINE: [x ] YES [ ] NO  LOCATION: [facility]  DATE INSERTED: 17  REMOVE: [ ] YES [ ] NO  EXPLAIN:    MUNIZ: [ x] YES [ ] NO    DATE INSERTED:17  REMOVE:  [ ] YES [ ] NO  EXPLAIN:    A-LINE:  [ ] YES [ x] NO  LOCATION:   DATE INSERTED:  REMOVE:  [ ] YES [ ] NO  EXPLAIN:    PMH -reviewed admission note, no change since admission  Heart faliure: acute [ ] chronic [ ] acute or chronic [ ] diastolic [ ] systolic [ ] combied systolic and diastolic[ ]  BONG: ATN[ ] renal medullary necrosis [ ] CKD I [ ]CKDII [ ]CKD III [ ]CKD IV [ ]CKD V [ ]Other pathological lesions [ ]  Abdominal Nutrition Status: malnutrition [ ] cachexia [ ] morbid obesity/BMI=40 [ ] Supplement ordered [___________]     ICU Vital Signs Last 24 Hrs  T(C): 35.2, Max: 35.6 (- @ 02:00)  T(F): 95.4, Max: 96 (- @ 02:00)  HR: 120 (98 - 122)  BP: 134/120 (70/56 - 142/97)  BP(mean): 124 (50 - 124)  ABP: --  ABP(mean): --  RR: 34 (13 - 34)  SpO2: 55% (55% - 98%)                  PHYSICAL EXAM:    GENERAL: patient tachypneic, restless, tachycardic.  HEAD: atraumatic, normocephalic   EYES: PERRLA, white sclera.   ENMT: nasal mucosa- normal  NECK: supple,  no JVD, thyroid- Normal  LYMPH: no palpable lymph nodes     SKIN: rash noted over perineal region and inframammary region bilaterally.  CHEST/LUNG: No Chest deformity , no chest tenderness. bilateral breath sounds.  HEART: RRR, no m/r/g   ABDOMEN: soft, Tender, distended, bowel sounds.  :muniz catheter.  EXTREMITIES: +1 non-pitting edema, no cyanosis, no clubbing.  NEURO: AA0X1 , mood/ affect- restless, no focal neuro deficits        LABS:  CBC Full  -  ( 22 May 2017 06:51 )  WBC Count : 21.8 K/uL  Hemoglobin : 9.1 g/dL  Hematocrit : 25.4 %  Platelet Count - Automated : 259 K/uL  Mean Cell Volume : 77.8 fl  Mean Cell Hemoglobin : 27.8 pg  Mean Cell Hemoglobin Concentration : 35.7 gm/dL  Auto Neutrophil # : x  Auto Lymphocyte # : x  Auto Monocyte # : x  Auto Eosinophil # : x  Auto Basophil # : x  Auto Neutrophil % : x  Auto Lymphocyte % : x  Auto Monocyte % : x  Auto Eosinophil % : x  Auto Basophil % : x    05-    135  |  102  |  11  ----------------------------<  132<H>  4.6   |  18<L>  |  0.76    Ca    6.7<L>      22 May 2017 06:51  Phos  1.8       Mg     1.7         TPro  5.1<L>  /  Alb  0.9<L>  /  TBili  0.6  /  DBili  x   /  AST  37  /  ALT  43  /  AlkPhos  169<H>  05-21      Urinalysis Basic - ( 21 May 2017 18:28 )    Color: Yellow / Appearance: Clear / S.010 / pH: x  Gluc: x / Ketone: Negative  / Bili: Negative / Urobili: Negative   Blood: x / Protein: 30 mg/dL / Nitrite: Negative   Leuk Esterase: Moderate / RBC: 10-25 /HPF / WBC 11-25 /HPF   Sq Epi: x / Non Sq Epi: Few / Bacteria: Moderate /HPF          RADIOLOGY & ADDITIONAL STUDIES REVIEWED:  ***    [ x]GOALS OF CARE DISCUSSION WITH PATIENT/FAMILY/PROXY: Full code

## 2017-05-23 LAB — HBA1C BLD-MCNC: 5.2 % — SIGNIFICANT CHANGE UP (ref 4–5.6)

## 2017-05-26 LAB
CULTURE RESULTS: SIGNIFICANT CHANGE UP
CULTURE RESULTS: SIGNIFICANT CHANGE UP
SPECIMEN SOURCE: SIGNIFICANT CHANGE UP
SPECIMEN SOURCE: SIGNIFICANT CHANGE UP

## 2017-05-30 LAB
CORTIS F/TOTAL MFR SERPL: 77 UG/DL — SIGNIFICANT CHANGE UP
CORTIS F/TOTAL MFR SERPL: 92 % — SIGNIFICANT CHANGE UP
CORTIS SERPL-MCNC: 84 UG/DL — SIGNIFICANT CHANGE UP
TRANSCORTIN SER-MCNC: 1 MG/DL — LOW

## 2017-06-01 DIAGNOSIS — L89.154 PRESSURE ULCER OF SACRAL REGION, STAGE 4: ICD-10-CM

## 2017-06-01 DIAGNOSIS — J96.90 RESPIRATORY FAILURE, UNSPECIFIED, UNSPECIFIED WHETHER WITH HYPOXIA OR HYPERCAPNIA: ICD-10-CM

## 2017-06-01 DIAGNOSIS — E87.2 ACIDOSIS: ICD-10-CM

## 2017-06-01 DIAGNOSIS — I26.99 OTHER PULMONARY EMBOLISM WITHOUT ACUTE COR PULMONALE: ICD-10-CM

## 2017-06-01 DIAGNOSIS — E43 UNSPECIFIED SEVERE PROTEIN-CALORIE MALNUTRITION: ICD-10-CM

## 2017-06-01 DIAGNOSIS — I46.9 CARDIAC ARREST, CAUSE UNSPECIFIED: ICD-10-CM

## 2017-06-01 DIAGNOSIS — E03.9 HYPOTHYROIDISM, UNSPECIFIED: ICD-10-CM

## 2017-06-01 DIAGNOSIS — I10 ESSENTIAL (PRIMARY) HYPERTENSION: ICD-10-CM

## 2017-06-01 DIAGNOSIS — D50.9 IRON DEFICIENCY ANEMIA, UNSPECIFIED: ICD-10-CM

## 2017-06-01 DIAGNOSIS — A41.9 SEPSIS, UNSPECIFIED ORGANISM: ICD-10-CM

## 2017-06-01 DIAGNOSIS — R62.7 ADULT FAILURE TO THRIVE: ICD-10-CM

## 2017-06-01 DIAGNOSIS — E11.9 TYPE 2 DIABETES MELLITUS WITHOUT COMPLICATIONS: ICD-10-CM

## 2017-06-01 DIAGNOSIS — R21 RASH AND OTHER NONSPECIFIC SKIN ERUPTION: ICD-10-CM

## 2017-10-30 RX ORDER — TRAZODONE HCL 50 MG
1 TABLET ORAL
Qty: 0 | Refills: 0 | COMMUNITY

## 2017-10-30 RX ORDER — METRONIDAZOLE 500 MG
1 TABLET ORAL
Qty: 0 | Refills: 0 | COMMUNITY

## 2017-10-30 RX ORDER — INSULIN LISPRO 100/ML
0 VIAL (ML) SUBCUTANEOUS
Qty: 0 | Refills: 0 | COMMUNITY

## 2017-10-30 RX ORDER — LACTOBACILLUS ACIDOPHILUS 100MM CELL
1 CAPSULE ORAL
Qty: 0 | Refills: 0 | COMMUNITY

## 2017-10-30 RX ORDER — MULTIVIT-MIN/FERROUS GLUCONATE 9 MG/15 ML
1 LIQUID (ML) ORAL
Qty: 0 | Refills: 0 | COMMUNITY

## 2017-10-30 RX ORDER — ASPIRIN/CALCIUM CARB/MAGNESIUM 324 MG
1 TABLET ORAL
Qty: 0 | Refills: 0 | COMMUNITY

## 2017-10-30 RX ORDER — FOLIC ACID 0.8 MG
1 TABLET ORAL
Qty: 0 | Refills: 0 | COMMUNITY

## 2017-10-30 RX ORDER — SIMVASTATIN 20 MG/1
1 TABLET, FILM COATED ORAL
Qty: 0 | Refills: 0 | COMMUNITY

## 2017-10-30 RX ORDER — ACETAMINOPHEN 500 MG
2 TABLET ORAL
Qty: 0 | Refills: 0 | COMMUNITY

## 2017-10-30 RX ORDER — FERROUS SULFATE 325(65) MG
1 TABLET ORAL
Qty: 0 | Refills: 0 | COMMUNITY

## 2017-10-30 RX ORDER — LEVOTHYROXINE SODIUM 125 MCG
1 TABLET ORAL
Qty: 0 | Refills: 0 | COMMUNITY

## 2018-06-01 NOTE — OCCUPATIONAL THERAPY INITIAL EVALUATION ADULT - LEVEL OF INDEPENDENCE: SIT/SUPINE, REHAB EVAL
maximum assist (25% patients effort) Home with RW, continue to ambulate as tolerated, home PT for endurance training/anticipated discharge recommendation

## 2019-01-29 NOTE — ED ADULT NURSE NOTE - ATTEMPT TO OOB
[facility] History & Physical    Subjective:      Patient ID:   Kitty Jennings  44 y.o. female  1974  Ana See NP      Chief Complaint:   Anemia eval.    HPI:  44 y.o. female with hx of Fe deficiency anemia, treated with oral Fe in the past.  Admits to fatigue.  Energy 6/10.  Works, goes home and goes to bed.  Intermittent hives since age 20.  Zertec and benadryl prn.    She completed Injectafer x's 2 weeks.  Stronger.  Hive sx have improved.  She saw Dr. Chavez.  Measures taken to control hives sx.     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  Smoke no.  ETOH no. Allergy no.    Hx HTN, GERD, migraine HA.  Menses NL flow.    Gastric sleave surgery 14.    M0  Menses onset 12  1st live child at 23    No family hx of anemia.  Dad HTN, DM  Mom A & W  Sister HTN x's 2    ROS:   GEN: normal without any fever, night sweats or weight loss  HEENT: normal with no HA's, sore throat, stiff neck, changes in vision  CV: normal with no CP, SOB, PND, KAM or orthopnea  PULM: normal with no SOB, cough, hemoptysis, sputum or pleuritic pain  GI: normal with no abdominal pain, nausea, vomiting, constipation, diarrhea, melanotic stools, BRBPR, or hematemesis  : normal with no hematuria, dysuria  BREAST: normal with no mass, discharge, pain  SKIN: see HPI      Review of patient's allergies indicates:  No Known Allergies  Social History     Socioeconomic History    Marital status: Single     Spouse name: Not on file    Number of children: Not on file    Years of education: Not on file    Highest education level: Not on file   Social Needs    Financial resource strain: Not hard at all    Food insecurity - worry: Never true    Food insecurity - inability: Never true    Transportation needs - medical: No    Transportation needs - non-medical: No   Occupational History    Not on file   Tobacco Use    Smoking status: Never Smoker    Smokeless tobacco: Never Used   Substance and Sexual Activity    Alcohol  use: No     Frequency: Never    Drug use: No    Sexual activity: Yes   Other Topics Concern    Not on file   Social History Narrative    Not on file         Current Outpatient Medications:     cetirizine (ZYRTEC) 10 mg Cap, Zyrtec 10 mg capsule  Take by oral route., Disp: , Rfl:     cetirizine (ZYRTEC) 10 MG tablet, Take 1 tablet (10 mg total) by mouth 2 (two) times daily., Disp: 60 tablet, Rfl: 3    diphenhydrAMINE (BENADRYL) 25 mg capsule, Benadryl 25 mg capsule  Take 2 capsules every 4 hours by oral route., Disp: , Rfl:     doxepin (SINEQUAN) 10 MG capsule, Take 1 capsule (10 mg total) by mouth nightly as needed (breakthrough itch)., Disp: 30 capsule, Rfl: 2    esomeprazole (NEXIUM) 40 MG capsule, Nexium 40 mg capsule,delayed release  TAKE ONE CAPSULE BY MOUTH DAILY, Disp: , Rfl:     olmesartan-hydrochlorothiazide (BENICAR HCT) 40-25 mg per tablet, Take 1 tablet by mouth once daily. , Disp: , Rfl:     propranolol (INDERAL LA) 160 mg 24 hr capsule, propranolol  mg capsule,24 hr,extended release, Disp: , Rfl:     ranitidine (ZANTAC) 150 MG tablet, Take 1 tablet (150 mg total) by mouth 2 (two) times daily., Disp: 60 tablet, Rfl: 3          Objective:   Vitals:  Blood pressure 104/67, pulse (!) 55, temperature 98.9 °F (37.2 °C), resp. rate 20, weight 112.6 kg (248 lb 4.8 oz).    She did not aggree to physical exam.    Hgb8.7. Ferritin 10.  Repeat CBC, Ferritin pendings.  Assessment:   (1) 44 y.o. female with diagnosis of Fe deficiency 2nd decreased absorption 2nd bariatric surgery.  Gave  Injectafer  Weekly x's 2 to replenish Fe stores.  Estimated 1-2% risk of reaction, she tolerated it well.  [facility].  Check lab and RTC 3 months.    (2)Referred to Dr. oLri Chavze of allergy/ immunology to try clarify hives and Rx.  She will follow up with MD.    RTC 4 months with CBC, Ferritin.    On B 6 po, PN sx in feet resolved.        1. Iron deficiency anemia following bariatric surgery    2.  Pyridoxine deficiency        Plan:       Iron deficiency anemia following bariatric surgery  -     CBC auto differential; Future; Expected date: 01/28/2019  -     Ferritin; Future; Expected date: 01/28/2019  -     Vitamin B6; Future; Expected date: 01/28/2019    Pyridoxine deficiency  -     Vitamin B6; Future; Expected date: 01/28/2019      Follow-up in about 4 months (around 5/28/2019) for check of blood status after therapy.           no

## 2019-03-05 NOTE — ED ADULT NURSE NOTE - NS ED NURSE REPORT GIVEN TO FT
Acne Type: Comedonal Lesions Hemostasis: Pressure Detail Level: Zone Extraction Method: 18 gauge needle, cotton-tipped applicators and gentle lateral pressure Prep Text (Optional): Prior to removal the treatment areas were prepped in the usual fashion. Consent was obtained and risks were reviewed including but not limited to scarring, infection, bleeding, scabbing, incomplete removal, and allergy to anesthesia. Render Post-Care Instructions In Note?: yes Render Number Of Lesions Treated: no Post-Care Instructions: I reviewed with the patient in detail post-care instructions. Patient is to keep the treatment areas dry overnight, and then apply bacitracin twice daily until healed. Patient may apply hydrogen peroxide soaks to remove any crusting. Chantel

## 2019-08-01 NOTE — ED ADULT NURSE NOTE - CHIEF COMPLAINT
The patient is a 76y Female complaining of Cheilitis Normal Treatment: I recommended application of Vaseline or Aquaphor numerous times a day (as often as every hour) and before going to bed.

## 2020-07-07 NOTE — PATIENT PROFILE ADULT. - BILL OF RIGHTS/ADMISSION INFORMATION PROVIDED TO:
[Initial Consultation] : an initial consultation [Mother] : mother [Medical Records] : medical records [FreeTextEntry2] : evaluation of vascular lesion in right neck, prior to ENT/airway surgical procedure. Patient Representative

## 2021-04-16 NOTE — DIETITIAN INITIAL EVALUATION ADULT. - PROBLEM SELECTOR PLAN 7
Spoke with pt to let her know that the pharmacy confirmed her percocet prescription is ready. No further questions.  
+UA  follow cultures

## 2021-05-24 NOTE — DIETITIAN INITIAL EVALUATION ADULT. - PROBLEM SELECTOR PROBLEM 3
Dementia Troponin elevation as above. Renal function intact b/l Cr 0.95-1. Troponinemia from acute myocardial stress from pulm fibrosis/pulm HTN. repeat EGK unchanged   - cardiology f/u appreciated.  - ECHO as above

## 2021-07-08 NOTE — PATIENT PROFILE ADULT. - STAGE
Stage IV Stage II unstageable Spine appears normal, range of motion is not limited, no muscle or joint tenderness

## 2021-11-16 NOTE — ED ADULT NURSE NOTE - THOUGHTS OF SUICIDE/SELF-HARM YN, MLM
Impression: Other secondary cataract, bilateral: H26.493. Plan: No treatment currently recommended due to level of vision. Patient will monitor vision changes and contact us with any decrease in vision, will re-evaluate on next visit.
No

## 2024-01-26 NOTE — ED ADULT TRIAGE NOTE - NS ED NURSE BANDS TYPE
Medication Refill Request      Name of Medication : Adderall       Strength of Medication: 20 mg       Directions: 1/2 tablet 2 times daily       30 day or 90 day supply: 30 day supply       Preferred Pharmacy: [address] in [place]      Additional Information For Provider:           Name band;

## 2024-05-04 NOTE — ED PROVIDER NOTE - NS ED NOTE TRAVEL COUNTRIES
[facility]
Quality 130: Documentation Of Current Medications In The Medical Record: Current Medications Documented
Quality 226: Preventive Care And Screening: Tobacco Use: Screening And Cessation Intervention: Patient screened for tobacco use and is an ex/non-smoker
Detail Level: Detailed

## 2024-11-18 NOTE — PHYSICAL THERAPY INITIAL EVALUATION ADULT - GENERAL OBSERVATIONS, REHAB EVAL
MIDLINE CATHETER    Procedure: Insertion of a single lumen BARD PowerGlide Pro Midline  Indication: IV Access   CONSENT: VERBAL CONSENT OBTAINED    A TIME OUT WAS COMPLETED VERIFYING CORRECT PATIENT, PROCEDURE,SITE,POSITIONIING AND EQUIPMENT.    LINE TYPE: 18 G 8CM MIDLINE CATHETER    LOCATION: Right brachial vein. The patient was placed in appropriate position for optimal vessel exposure.    PREPARATION: The site was cleaned with chlorhexidine prep.    TECHNIQUE: After identifying the target vein, using ultrasound guidance, sterile technique was used including hand hygiene, mask, chloraprep, sterile gel, probe cover, sterile gloves, StatLock, BioPatch, and transparent dressing per hospital protocol, the needle was inserted into the target vein.  Blood was aspirated from the port and subsequently flushed with normal saline. The catheter was then secured in place with Biopatch and Tegaderm.     LOT#:[id number]  EXP: 05/31/2025    Please Note:  This is a Power injectable PowerGlide Midline and may be used for contrast injections.    Recommendations:  No chest x--ray or ECG confirmation required as this is a peripheral IV. Midline may be used immediately. Lauri BRO, notified and reminded to change IV tubing prior to accessing Midline.      Placed by Venous Access Team                  '    
Forward to coumadin nurse  
Pt was seen supine in NAD

## 2025-06-09 NOTE — PHYSICAL THERAPY INITIAL EVALUATION ADULT - PLANNED THERAPY INTERVENTIONS, PT EVAL
Patient calling back. Would like to go with the vial option of $354 a month for her Zepbound 2.5 mg.     Her pharmacy is:   [facility] - [place], [place] - [address]   [id number] [place], [facility] [address]  Phone: [phone]  Fax: [phone].         bed mobility training/transfer training/gait training